# Patient Record
Sex: FEMALE | Race: WHITE | URBAN - METROPOLITAN AREA
[De-identification: names, ages, dates, MRNs, and addresses within clinical notes are randomized per-mention and may not be internally consistent; named-entity substitution may affect disease eponyms.]

---

## 2017-03-01 ENCOUNTER — TELEPHONE (OUTPATIENT)
Dept: OBGYN CLINIC | Age: 22
End: 2017-03-01

## 2017-03-01 NOTE — TELEPHONE ENCOUNTER
- LMTCB on cell and home numbers. Also called Pierce Smith (on PHI) and LM to have patient called me back. She voiced understanding.       ____________________________________        Ogemaw Lusher hugo Kothari,     I see that you had cancelled your 3 month follow up appointment on 1/30/17. May I reschedule this appointment for you? This appointment is for a 3 month test of cure, vaginal swab for STDs.      Thank you,   BETI Guo   (Dr. Anibal Rajput Nurse)          This [employer] message has not been read

## 2017-03-01 NOTE — LETTER
03/03/2017 Osmany Cha [facility] [address] Dear Malia Spencer; We have been unable to reach you by telephone regarding missed appointment. Please call our office at [phone] at your earliest convenience. Respectfully, 
 
 
 
 
BETI Silva 
(Dr. Shawna Valdivia Nurse)

## 2017-03-01 NOTE — TELEPHONE ENCOUNTER
----- Message from Jacqueline Bloch sent at 2/9/2017  8:42 AM EST -----  Regarding: RE: 3 month LATANYA - 1/2017  Mychart message sent to pt. She had cancelled her 1/20/17 LATANYA appt.     ----- Message -----     From: Jacqueline Bloch     Sent: 10/27/2016   3:36 PM       To: Jacqueline Bloch  Subject: 3 month LATANYA - 1/2017                             Patient returned my call and I advised of lab results.  She voiced understanding. (+ Chlamydia 10/2016)     3 month LATANYA donald'd for Monday, 1/30/16 @ 9:30am.

## 2017-03-10 ENCOUNTER — DOCUMENTATION ONLY (OUTPATIENT)
Dept: OBGYN CLINIC | Age: 22
End: 2017-03-10

## 2017-03-10 NOTE — PROGRESS NOTES
Records received and reivewed from [facility].     AE (2/16/16)   pap neg  +BV  Aviane    Visit (5/26/16) for pain  US wnl  GC neg

## 2017-05-19 ENCOUNTER — OFFICE VISIT (OUTPATIENT)
Dept: NEUROLOGY | Age: 22
End: 2017-05-19

## 2017-05-19 ENCOUNTER — TELEPHONE (OUTPATIENT)
Dept: NEUROLOGY | Age: 22
End: 2017-05-19

## 2017-05-19 VITALS
DIASTOLIC BLOOD PRESSURE: 72 MMHG | TEMPERATURE: 98.4 F | RESPIRATION RATE: 15 BRPM | WEIGHT: 175 LBS | BODY MASS INDEX: 29.88 KG/M2 | OXYGEN SATURATION: 98 % | HEART RATE: 65 BPM | HEIGHT: 64 IN | SYSTOLIC BLOOD PRESSURE: 110 MMHG

## 2017-05-19 DIAGNOSIS — G43.019 MIGRAINE WITHOUT AURA, INTRACTABLE, WITHOUT STATUS MIGRAINOSUS: Primary | ICD-10-CM

## 2017-05-19 RX ORDER — SUMATRIPTAN 100 MG/1
TABLET, FILM COATED ORAL
Qty: 9 TAB | Refills: 3 | Status: SHIPPED | OUTPATIENT
Start: 2017-05-19 | End: 2017-09-13

## 2017-05-19 NOTE — MR AVS SNAPSHOT
Visit Information Date & Time Provider Department Dept. Phone Encounter #  
 5/19/2017 11:30 AM Javier Solo MD Neurology [facility] [place] [address] [phone] [id number] Follow-up Instructions Return in about 2 months (around 7/19/2017). Follow-up and Disposition History Upcoming Health Maintenance Date Due  
 HPV AGE 9Y-34Y (1 of 3 - Female 3 Dose Series) 11/22/2005 DTaP/Tdap/Td series (1 - Tdap) 11/22/2015 PAP AKA CERVICAL CYTOLOGY 11/22/2015 INFLUENZA AGE 9 TO ADULT 8/1/2017 Allergies as of 5/19/2017  Review Complete On: 5/19/2017 By: Javier Solo MD  
 No Known Allergies Current Immunizations  Never Reviewed No immunizations on file. Not reviewed this visit You Were Diagnosed With   
  
 Codes Comments Migraine without aura, intractable, without status migrainosus    -  Primary ICD-10-CM: G43.019 
ICD-9-CM: 346.11 Vitals BP Pulse Temp Resp Height(growth percentile) Weight(growth percentile) 110/72 65 98.4 °F (36.9 °C) 15 5' 4\" (1.626 m) 175 lb (79.4 kg) SpO2 BMI OB Status Smoking Status 98% 30.04 kg/m2 Having regular periods Never Smoker BMI and BSA Data Body Mass Index Body Surface Area 30.04 kg/m 2 1.89 m 2 [facility]erred [facility] [facility] [facility] [address] [facility] [phone] Your Updated Medication List  
  
   
This list is accurate as of: 5/19/17  3:59 PM.  Always use your most recent med list.  
  
  
  
  
 * SUMAtriptan succinate 3 mg/0.5 mL Pnij Commonly known as:  Dae Eunice  
1 Syringe by SubCUTAneous route as needed. 1 at HA onset and repeat q 1 hour until VELOZ relieved or used 4 in 24 hours * SUMAtriptan 100 mg tablet Commonly known as:  IMITREX  
1 at HA onset and repeat in 2 hours x 1 prn  
  
 topiramate ER 25 mg capsule Commonly known as:  TROKENDI XR  
 25 mg nightly for 1 week then, 50 mg nightly for 1 week then, 75 mg nightly for 1 week then, 100mg nightly thereafter. * Notice: This list has 2 medication(s) that are the same as other medications prescribed for you. Read the directions carefully, and ask your doctor or other care provider to review them with you. Prescriptions Sent to Pharmacy Refills SUMAtriptan succinate (ZEMBRACE SYMTOUCH) 3 mg/0.5 mL pnij 3 Si Syringe by SubCUTAneous route as needed. 1 at HA onset and repeat q 1 hour until VELOZ relieved or used 4 in 24 hours Class: Normal  
 Pharmacy: [address] Ph #: [phone] Route: SubCUTAneous SUMAtriptan (IMITREX) 100 mg tablet 3 Si at HA onset and repeat in 2 hours x 1 prn Class: Normal  
 Pharmacy: [address] [place] [address] Ph #: [phone]  
 topiramate ER (TROKENDI XR) 25 mg capsule 0 Si mg nightly for 1 week then, 50 mg nightly for 1 week then, 75 mg nightly for 1 week then, 100mg nightly thereafter. Class: Normal  
 Pharmacy: [address] Ph #: [phone] Follow-up Instructions Return in about 2 months (around 2017). Patient Instructions Track her headaches on a calendar and bring that calendar to each appointment To treat the headache I am giving you a combination of the Zembrace injection and sumatriptan tablets to use when you have a headache If you use the injection use 1 Zembrace injection at headache onset and repeat in 1 hour and you can use up to 4 of these in 24 hours For the tablets you can use 1 at headache onset and repeat in 2 hours up to a total of 2 tablets in 24 hours If you use a tablet and it fails and you wish to use the Zembrace injection due can use up to 2 doses of the injectable following the use of 1 tablet Start Trokendi  for prevention of migraines, 25 mg nightly for 1 week then, 50 mg nightly for 1 week then, 75 mg nightly for 1 week then, 100mg nightly thereafter. I sent the prescription for the 25 mg capsules of Trokendi to the pharmacy and gave you some samples of the 100 mg strength. Once you have finished the samples let me know and we will send a new prescription for the 100 mg capsules Introducing [facility]! Dear Jass Reece: Thank you for requesting a [employer] account. Our records indicate that you already have an active [employer] account. You can access your account anytime at [email or web address] Did you know that you can access your hospital and ER discharge instructions at any time in [employer]? You can also review all of your test results from your hospital stay or ER visit. Additional Information If you have questions, please visit the Frequently Asked Questions section of the [employer] website at [email or web address] Remember, [employer] is NOT to be used for urgent needs. For medical emergencies, dial 911. Now available from your iPhone and Android! Please provide this summary of care documentation to your next provider. Your primary care clinician is listed as Bill Welsh. If you have any questions after today's visit, please call [phone].

## 2017-05-19 NOTE — PATIENT INSTRUCTIONS
Track her headaches on a calendar and bring that calendar to each appointment    To treat the headache I am giving you a combination of the Zembrace injection and sumatriptan tablets to use when you have a headache    If you use the injection use 1 Zembrace injection at headache onset and repeat in 1 hour and you can use up to 4 of these in 24 hours    For the tablets you can use 1 at headache onset and repeat in 2 hours up to a total of 2 tablets in 24 hours    If you use a tablet and it fails and you wish to use the Zembrace injection due can use up to 2 doses of the injectable following the use of 1 tablet    Start Trokendi  for prevention of migraines, 25 mg nightly for 1 week then, 50 mg nightly for 1 week then, 75 mg nightly for 1 week then, 100mg nightly thereafter. I sent the prescription for the 25 mg capsules of Trokendi to the pharmacy and gave you some samples of the 100 mg strength.   Once you have finished the samples let me know and we will send a new prescription for the 100 mg capsules

## 2017-05-19 NOTE — PROGRESS NOTES
[address] [facility]   [place] [address] [address]   [place]   [phone] [place] [phone] Fax               Chief Complaint   Patient presents with    Headache     New patient    24-year-old left-handed young woman who presents today for evaluation of what she calls migraine headaches. She tells me that she has had headaches since childhood. When she was younger she would have a daily headache. She thought it was her eyes and she was taken for an eye examination and that was fine. She never had any neurologic intervention. She notes that the headaches seem to dissipate. They started to come back over the last year or so. Over the last few months they have gotten worse. She is not sleeping as well. She is working 2 jobs. Her stress level has increased the due to multiple issues. She notes over the last 3 months headaches have been more frequent and more intense. Headaches are typically located in the back of the head or in the frontal portion. Typically they are bilateral.  The headache she has today started on the right side and is now by temporal.  She describes the pain as sharp and sometimes throbbing. The pain will escalate throughout the headache. It comes without warning. No aura. She will frequently awaken with headaches as well. No triggers except that she notes that with exercise she can trigger a headache. No food triggers etc.  She has been using Excedrin or Motrin. She used Motrin exclusively and noted that it became ineffective and started to make her more nauseated and she changed to Excedrin. She has never had any migraine specific medications. She has not vomited but again has significant nausea. This is with a headache. No visual loss or change. No numbness tingling. No fever or chills. No chest pain or palpitations. She does not have any family history of headache or migraine.     She has no past medical or past surgical history. She takes no regular medicines at this point. Current Outpatient Prescriptions   Medication Sig Dispense Refill    levonorgestrel-ethinyl estradiol (AVIANE, ALESSE, LESSINA) 0.1-20 mg-mcg tab Take 1 Tab by mouth daily. 84 Tab 1    ondansetron (ZOFRAN ODT) 4 mg disintegrating tablet Take 1 Tab by mouth every eight (8) hours as needed for Nausea. 12 Tab 0    no medications prescribed at present but she is taking over-the-counter preparations with each headache and she does that multiple times a day    No Known Allergies  Social History   Substance Use Topics    Smoking status: Never Smoker    Smokeless tobacco: Never Used      Comment: Never used vapor or e-cigs     Alcohol use 3.0 oz/week     5 Cans of beer per week      Comment: Twice a week      ROS  Pertinent positives and negatives are noted and otherwise comprehensive review of systems is negative. Examination  Visit Vitals    /72    Pulse 65    Temp 98.4 °F (36.9 °C)    Resp 15    Ht 5' 4\" (1.626 m)    Wt 79.4 kg (175 lb)    SpO2 98%    BMI 30.04 kg/m2     Pleasant, well appearing. No icterus. Oropharynx clear. Supple neck without bruit. Heart regular. No murmur. No edema. Neurologically, she is awake, alert, and oriented with normal speech and language. Her cognition is normal.    Intact cranial nerves 2-12. No nystagmus. Visual fields full to confrontation. Disk margins are flat bilaterally. She has normal bulk and tone. She has no abnormal movement. She has no pronation or drift. She generates full strength in the upper and lower extremities to direct confrontational testing. Reflexes are symmetrical in the upper and lower extremities bilaterally. Her toes are down bilaterally. No Murry. Finger nose finger and rapid alternating movements are normal.  Steady gait. She is able to heel, toe, and tandem walk. No sensory deficit to primary modalities.       Impression/Plan  57-year-old with migraine headaches. We discussed migraine pathophysiology and its treatment including preventatives and abortive therapies. We discussed analgesic overuse and she is to stop using any over-the-counter medications. To treat her headache we gave her a tool kit approach using Zembrace injections particularly for the headaches with which she awakens or if the Imitrex 100 mg tablets we give her fail. She is to use either the tablet of the injection at headache onset. She may repeat the injection q. one hour until the headache is relieved or until she has used for in 24 hours. She may use the tablet 1 at onset and repeat in 2 hours maximum 2 doses in 24 hours. She can use an injection if she has use 1 tablet and it has failed. We discussed typical triptan side effects as well as potential benefits and alternatives. Her examination is normal and therefore I do not think we need to proceed with any further investigations. She will start Trokendi in a customary fashion for prevention and we will titrate to a dose of 100 mg daily and we again discussed potential side effects benefits and alternatives. She will track her headaches on a calendar and bring those to each appointment. She was given written migraine/headache education today. This note was created using voice recognition software. Despite editing, there may be syntax errors. This note will not be viewable in [address]

## 2017-05-19 NOTE — TELEPHONE ENCOUNTER
Spoke to Aspen Johnson @ [place] [facility] trokendi xr. Questioned if provider wanted topamax or trokendi XR titration. Per Dr Ambrosio Hardwick Trokendi XR.

## 2017-05-19 NOTE — TELEPHONE ENCOUNTER
----- Message from Gerald Dandy sent at 5/19/2017 12:52 PM EDT -----  Regarding: Dr. Sapphire Chapa from the \"[place] pharmacy next door\" would like a callback regarding pt's Rx. Best (C) [phone].

## 2017-05-19 NOTE — PROGRESS NOTES
New patient presenting with 11-15 headache days per month lasting 4 hours or more. Reports pain at temples, behind eyes and at base of neck. No testing done. No head injury. Takes OTC medications with each headache. Reports daily headaches as a child.

## 2017-05-31 ENCOUNTER — OFFICE VISIT (OUTPATIENT)
Dept: FAMILY MEDICINE CLINIC | Facility: CLINIC | Age: 22
End: 2017-05-31

## 2017-05-31 VITALS
HEIGHT: 64 IN | BODY MASS INDEX: 29.04 KG/M2 | HEART RATE: 74 BPM | DIASTOLIC BLOOD PRESSURE: 71 MMHG | RESPIRATION RATE: 20 BRPM | WEIGHT: 170.1 LBS | SYSTOLIC BLOOD PRESSURE: 126 MMHG | TEMPERATURE: 98 F | OXYGEN SATURATION: 99 %

## 2017-05-31 DIAGNOSIS — J06.9 UPPER RESPIRATORY TRACT INFECTION, UNSPECIFIED TYPE: Primary | ICD-10-CM

## 2017-05-31 DIAGNOSIS — R05.9 COUGH: ICD-10-CM

## 2017-05-31 DIAGNOSIS — J02.9 SORE THROAT: ICD-10-CM

## 2017-05-31 LAB
QUICKVUE INFLUENZA TEST: NEGATIVE
S PYO AG THROAT QL: NEGATIVE
VALID INTERNAL CONTROL?: YES
VALID INTERNAL CONTROL?: YES

## 2017-05-31 RX ORDER — BROMPHENIRAMINE MALEATE, PSEUDOEPHEDRINE HYDROCHLORIDE, AND DEXTROMETHORPHAN HYDROBROMIDE 2; 30; 10 MG/5ML; MG/5ML; MG/5ML
5 SYRUP ORAL
Qty: 118 ML | Refills: 0 | Status: SHIPPED | OUTPATIENT
Start: 2017-05-31 | End: 2017-06-07

## 2017-05-31 NOTE — MR AVS SNAPSHOT
Visit Information Date & Time Provider Department Dept. Phone Encounter #  
 5/31/2017  9:45 AM Nataly Oliveira NP [facility] [address] [phone] [id number] Upcoming Health Maintenance Date Due  
 HPV AGE 9Y-34Y (1 of 3 - Female 3 Dose Series) 11/22/2005 DTaP/Tdap/Td series (1 - Tdap) 11/22/2015 PAP AKA CERVICAL CYTOLOGY 11/22/2015 INFLUENZA AGE 9 TO ADULT 8/1/2017 Allergies as of 5/31/2017  Review Complete On: 5/31/2017 By: Nataly Oliveira NP No Known Allergies Current Immunizations  Never Reviewed No immunizations on file. Not reviewed this visit You Were Diagnosed With   
  
 Codes Comments Upper respiratory tract infection, unspecified type    -  Primary ICD-10-CM: J06.9 ICD-9-CM: 465.9 Cough     ICD-10-CM: R05 ICD-9-CM: 786.2 Sore throat     ICD-10-CM: J02.9 ICD-9-CM: 133 Vitals BP Pulse Temp Resp Height(growth percentile) Weight(growth percentile) 126/71 (BP 1 Location: Right arm, BP Patient Position: Sitting) 74 98 °F (36.7 °C) (Oral) 20 5' 4\" (1.626 m) 170 lb 1.6 oz (77.2 kg) LMP SpO2 BMI OB Status Smoking Status 05/24/2017 (Approximate) 99% 29.2 kg/m2 Having regular periods Never Smoker BMI and BSA Data Body Mass Index Body Surface Area  
 29.2 kg/m 2 1.87 m 2 Preferred Pharmacy Pharmacy Name Phone [id number] [place] [address] [facility] [phone] Your Updated Medication List  
  
   
This list is accurate as of: 5/31/17 10:16 AM.  Always use your most recent med list.  
  
  
  
  
 Brompheniramine-Pseudoeph-DM 2-30-10 mg/5 mL syrup Commonly known as:  BROMFED DM Take 5 mL by mouth three (3) times daily as needed for up to 7 days. * SUMAtriptan succinate 3 mg/0.5 mL Pnij Commonly known as:  Verasya Rushingel  
1 Syringe by SubCUTAneous route as needed. 1 at HA onset and repeat q 1 hour until VELOZ relieved or used 4 in 24 hours * SUMAtriptan 100 mg tablet Commonly known as:  IMITREX  
1 at HA onset and repeat in 2 hours x 1 prn  
  
 topiramate ER 25 mg capsule Commonly known as:  TROKENDI XR  
25 mg nightly for 1 week then, 50 mg nightly for 1 week then, 75 mg nightly for 1 week then, 100mg nightly thereafter. * Notice: This list has 2 medication(s) that are the same as other medications prescribed for you. Read the directions carefully, and ask your doctor or other care provider to review them with you. Prescriptions Sent to Pharmacy Refills Brompheniramine-Pseudoeph-DM (BROMFED DM) 2-30-10 mg/5 mL syrup 0 Sig: Take 5 mL by mouth three (3) times daily as needed for up to 7 days. Class: Normal  
 Pharmacy: [facility]  #: [phone] Route: Oral  
  
We Performed the Following AMB POC RAPID INFLUENZA TEST [32914 CPT(R)] AMB POC RAPID STREP A [17937 CPT(R)] Patient Instructions Cough: Care Instructions Your Care Instructions A cough is your body's response to something that bothers your throat or airways. Many things can cause a cough. You might cough because of a cold or the flu, bronchitis, or asthma. Smoking, postnasal drip, allergies, and stomach acid that backs up into your throat also can cause coughs. A cough is a symptom, not a disease. Most coughs stop when the cause, such as a cold, goes away. You can take a few steps at home to cough less and feel better. Follow-up care is a key part of your treatment and safety. Be sure to make and go to all appointments, and call your doctor if you are having problems. It's also a good idea to know your test results and keep a list of the medicines you take. How can you care for yourself at home? · Drink lots of water and other fluids. This helps thin the mucus and soothes a dry or sore throat. Honey or lemon juice in hot water or tea may ease a dry cough. · Take cough medicine as directed by your doctor. · Prop up your head on pillows to help you breathe and ease a dry cough. · Try cough drops to soothe a dry or sore throat. Cough drops don't stop a cough. Medicine-flavored cough drops are no better than candy-flavored drops or hard candy. · Do not smoke. Avoid secondhand smoke. If you need help quitting, talk to your doctor about stop-smoking programs and medicines. These can increase your chances of quitting for good. When should you call for help? Call 911 anytime you think you may need emergency care. For example, call if: 
· You have severe trouble breathing. Call your doctor now or seek immediate medical care if: 
· You cough up blood. · You have new or worse trouble breathing. · You have a new or higher fever. · You have a new rash. Watch closely for changes in your health, and be sure to contact your doctor if: 
· You cough more deeply or more often, especially if you notice more mucus or a change in the color of your mucus. · You have new symptoms, such as a sore throat, an earache, or sinus pain. · You do not get better as expected. Where can you learn more? Go to http://rika-chayo.info/. Enter D279 in the search box to learn more about \"Cough: Care Instructions. \" Current as of: May 27, 2016 Content Version: 11.2 © 6811-2358 [employer]. Care instructions adapted under license by [employer] (which disclaims liability or warranty for this information). If you have questions about a medical condition or this instruction, always ask your healthcare professional. [place] [address] any warranty or liability for your use of this information. Sore Throat: Care Instructions Your Care Instructions Infection by bacteria or a virus causes most sore throats.  Cigarette smoke, dry air, air pollution, allergies, and yelling can also cause a sore throat. Sore throats can be painful and annoying. Fortunately, most sore throats go away on their own. If you have a bacterial infection, your doctor may prescribe antibiotics. Follow-up care is a key part of your treatment and safety. Be sure to make and go to all appointments, and call your doctor if you are having problems. It's also a good idea to know your test results and keep a list of the medicines you take. How can you care for yourself at home? · If your doctor prescribed antibiotics, take them as directed. Do not stop taking them just because you feel better. You need to take the full course of antibiotics. · Gargle with warm salt water once an hour to help reduce swelling and relieve discomfort. Use 1 teaspoon of salt mixed in 1 cup of warm water. · Take an over-the-counter pain medicine, such as acetaminophen (Tylenol), ibuprofen (Advil, Motrin), or naproxen (Aleve). Read and follow all instructions on the label. · Be careful when taking over-the-counter cold or flu medicines and Tylenol at the same time. Many of these medicines have acetaminophen, which is Tylenol. Read the labels to make sure that you are not taking more than the recommended dose. Too much acetaminophen (Tylenol) can be harmful. · Drink plenty of fluids. Fluids may help soothe an irritated throat. Hot fluids, such as tea or soup, may help decrease throat pain. · Use over-the-counter throat lozenges to soothe pain. Regular cough drops or hard candy may also help. These should not be given to young children because of the risk of choking. · Do not smoke or allow others to smoke around you. If you need help quitting, talk to your doctor about stop-smoking programs and medicines. These can increase your chances of quitting for good. · Use a vaporizer or humidifier to add moisture to your bedroom. Follow the directions for cleaning the machine. When should you call for help? Call your doctor now or seek immediate medical care if: 
· You have new or worse trouble swallowing. · Your sore throat gets much worse on one side. Watch closely for changes in your health, and be sure to contact your doctor if you do not get better as expected. Where can you learn more? Go to http://rika-chayo.info/. Enter [id number] [id number] in the search box to learn more about \"Sore Throat: Care Instructions. \" Current as of: July 29, 2016 Content Version: 11.2 © 6744-8070 [employer]. Care instructions adapted under license by [employer] (which disclaims liability or warranty for this information). If you have questions about a medical condition or this instruction, always ask your healthcare professional. [address] any warranty or liability for your use of this information. Rapid Strep Test: About This Test 
What is it? A rapid strep test checks the bacteria in your throat to see if strep is the cause of your sore throat. Why is this test done? It may be done so your doctor can find out right away whether you have strep throat. There is another test for strep, called a throat culture, but that test takes a few days to get the results. How can you prepare for the test? 
You don't need to do anything before you have this test. 
What happens during the test? 
· You will be asked to tilt your head back and open your mouth as wide as possible. · Your doctor will press your tongue down with a flat stick (tongue depressor) and then examine your mouth and throat. · A clean cotton swab will be rubbed over the back of your throat, around your tonsils, and over any red areas or sores to collect a sample. How long does the test take? · The test takes less than a minute. · Results are available in 10 to 15 minutes. When should you call for help? Call your doctor now or seek immediate medical care if: · Your pain gets worse on one side of your throat, or you have trouble opening your mouth. · You have a new or higher fever, or you have a fever with a stiff neck or severe headache. · Swallowing becomes harder, or you have any trouble breathing. · You are sensitive to light or feel very sleepy or confused. Watch closely for changes in your health, and be sure to contact your doctor if: 
· You do not start to feel better after 2 days. Follow-up care is a key part of your treatment and safety. Be sure to make and go to all appointments, and call your doctor if you are having problems. It's also a good idea to keep a list of the medicines you take. Ask your doctor when you can expect to have your test results. Where can you learn more? Go to http://rika-chayo.info/. Enter B356 in the search box to learn more about \"Rapid Strep Test: About This Test.\" Current as of: July 29, 2016 Content Version: 11.2 © 3811-4513 [employer]. Care instructions adapted under license by [employer] (which disclaims liability or warranty for this information). If you have questions about a medical condition or this instruction, always ask your healthcare professional. [place] [address] any warranty or liability for your use of this information. Upper Respiratory Infection (Cold): Care Instructions Your Care Instructions An upper respiratory infection, or URI, is an infection of the nose, sinuses, or throat. URIs are spread by coughs, sneezes, and direct contact. The common cold is the most frequent kind of URI. The flu and sinus infections are other kinds of URIs. Almost all URIs are caused by viruses. Antibiotics won't cure them. But you can treat most infections with home care. This may include drinking lots of fluids and taking over-the-counter pain medicine. You will probably feel better in 4 to 10 days. The doctor has checked you carefully, but problems can develop later. If you notice any problems or new symptoms, get medical treatment right away. Follow-up care is a key part of your treatment and safety. Be sure to make and go to all appointments, and call your doctor if you are having problems. It's also a good idea to know your test results and keep a list of the medicines you take. How can you care for yourself at home? · To prevent dehydration, drink plenty of fluids, enough so that your urine is light yellow or clear like water. Choose water and other caffeine-free clear liquids until you feel better. If you have kidney, heart, or liver disease and have to limit fluids, talk with your doctor before you increase the amount of fluids you drink. · Take an over-the-counter pain medicine, such as acetaminophen (Tylenol), ibuprofen (Advil, Motrin), or naproxen (Aleve). Read and follow all instructions on the label. · Before you use cough and cold medicines, check the label. These medicines may not be safe for young children or for people with certain health problems. · Be careful when taking over-the-counter cold or flu medicines and Tylenol at the same time. Many of these medicines have acetaminophen, which is Tylenol. Read the labels to make sure that you are not taking more than the recommended dose. Too much acetaminophen (Tylenol) can be harmful. · Get plenty of rest. 
· Do not smoke or allow others to smoke around you. If you need help quitting, talk to your doctor about stop-smoking programs and medicines. These can increase your chances of quitting for good. When should you call for help? Call 911 anytime you think you may need emergency care. For example, call if: 
· You have severe trouble breathing. Call your doctor now or seek immediate medical care if: 
· You seem to be getting much sicker. · You have new or worse trouble breathing. · You have a new or higher fever. · You have a new rash. Watch closely for changes in your health, and be sure to contact your doctor if: 
· You have a new symptom, such as a sore throat, an earache, or sinus pain. · You cough more deeply or more often, especially if you notice more mucus or a change in the color of your mucus. · You do not get better as expected. Where can you learn more? Go to http://rika-chayo.info/. Enter Z929 in the search box to learn more about \"Upper Respiratory Infection (Cold): Care Instructions. \" Current as of: June 30, 2016 Content Version: 11.2 © 5901-2261 [employer]. Care instructions adapted under license by [employer] (which disclaims liability or warranty for this information). If you have questions about a medical condition or this instruction, always ask your healthcare professional. [address] any warranty or liability for your use of this information. Introducing [facility]! Dear Jorge Light: Thank you for requesting a [employer] account. Our records indicate that you already have an active [employer] account. You can access your account anytime at [email or web address] Did you know that you can access your hospital and ER discharge instructions at any time in [employer]? You can also review all of your test results from your hospital stay or ER visit. Additional Information If you have questions, please visit the Frequently Asked Questions section of the [employer] website at [email or web address] Remember, [employer] is NOT to be used for urgent needs. For medical emergencies, dial 911. Now available from your iPhone and Android! Please provide this summary of care documentation to your next provider. Your primary care clinician is listed as Ye Rose. If you have any questions after today's visit, please call [phone].

## 2017-05-31 NOTE — PATIENT INSTRUCTIONS
Cough: Care Instructions  Your Care Instructions  A cough is your body's response to something that bothers your throat or airways. Many things can cause a cough. You might cough because of a cold or the flu, bronchitis, or asthma. Smoking, postnasal drip, allergies, and stomach acid that backs up into your throat also can cause coughs. A cough is a symptom, not a disease. Most coughs stop when the cause, such as a cold, goes away. You can take a few steps at home to cough less and feel better. Follow-up care is a key part of your treatment and safety. Be sure to make and go to all appointments, and call your doctor if you are having problems. It's also a good idea to know your test results and keep a list of the medicines you take. How can you care for yourself at home? · Drink lots of water and other fluids. This helps thin the mucus and soothes a dry or sore throat. Honey or lemon juice in hot water or tea may ease a dry cough. · Take cough medicine as directed by your doctor. · Prop up your head on pillows to help you breathe and ease a dry cough. · Try cough drops to soothe a dry or sore throat. Cough drops don't stop a cough. Medicine-flavored cough drops are no better than candy-flavored drops or hard candy. · Do not smoke. Avoid secondhand smoke. If you need help quitting, talk to your doctor about stop-smoking programs and medicines. These can increase your chances of quitting for good. When should you call for help? Call 911 anytime you think you may need emergency care. For example, call if:  · You have severe trouble breathing. Call your doctor now or seek immediate medical care if:  · You cough up blood. · You have new or worse trouble breathing. · You have a new or higher fever. · You have a new rash.   Watch closely for changes in your health, and be sure to contact your doctor if:  · You cough more deeply or more often, especially if you notice more mucus or a change in the color of your mucus. · You have new symptoms, such as a sore throat, an earache, or sinus pain. · You do not get better as expected. Where can you learn more? Go to http://rika-chayo.info/. Enter D279 in the search box to learn more about \"Cough: Care Instructions. \"  Current as of: May 27, 2016  Content Version: 11.2  © 2006-2017 [employer]. Care instructions adapted under license by [employer] (which disclaims liability or warranty for this information). If you have questions about a medical condition or this instruction, always ask your healthcare professional. [place] [address] any warranty or liability for your use of this information. Sore Throat: Care Instructions  Your Care Instructions    Infection by bacteria or a virus causes most sore throats. Cigarette smoke, dry air, air pollution, allergies, and yelling can also cause a sore throat. Sore throats can be painful and annoying. Fortunately, most sore throats go away on their own. If you have a bacterial infection, your doctor may prescribe antibiotics. Follow-up care is a key part of your treatment and safety. Be sure to make and go to all appointments, and call your doctor if you are having problems. It's also a good idea to know your test results and keep a list of the medicines you take. How can you care for yourself at home? · If your doctor prescribed antibiotics, take them as directed. Do not stop taking them just because you feel better. You need to take the full course of antibiotics. · Gargle with warm salt water once an hour to help reduce swelling and relieve discomfort. Use 1 teaspoon of salt mixed in 1 cup of warm water. · Take an over-the-counter pain medicine, such as acetaminophen (Tylenol), ibuprofen (Advil, Motrin), or naproxen (Aleve). Read and follow all instructions on the label.   · Be careful when taking over-the-counter cold or flu medicines and Tylenol at the same time. Many of these medicines have acetaminophen, which is Tylenol. Read the labels to make sure that you are not taking more than the recommended dose. Too much acetaminophen (Tylenol) can be harmful. · Drink plenty of fluids. Fluids may help soothe an irritated throat. Hot fluids, such as tea or soup, may help decrease throat pain. · Use over-the-counter throat lozenges to soothe pain. Regular cough drops or hard candy may also help. These should not be given to young children because of the risk of choking. · Do not smoke or allow others to smoke around you. If you need help quitting, talk to your doctor about stop-smoking programs and medicines. These can increase your chances of quitting for good. · Use a vaporizer or humidifier to add moisture to your bedroom. Follow the directions for cleaning the machine. When should you call for help? Call your doctor now or seek immediate medical care if:  · You have new or worse trouble swallowing. · Your sore throat gets much worse on one side. Watch closely for changes in your health, and be sure to contact your doctor if you do not get better as expected. Where can you learn more? Go to http://rika-chayo.info/. Enter [id number] [id number] in the search box to learn more about \"Sore Throat: Care Instructions. \"  Current as of: July 29, 2016  Content Version: 11.2  © 9093-2351 [employer]. Care instructions adapted under license by [employer] (which disclaims liability or warranty for this information). If you have questions about a medical condition or this instruction, always ask your healthcare professional. [place] [address] any warranty or liability for your use of this information. Rapid Strep Test: About This Test  What is it? A rapid strep test checks the bacteria in your throat to see if strep is the cause of your sore throat. Why is this test done?   It may be done so your doctor can find out right away whether you have strep throat. There is another test for strep, called a throat culture, but that test takes a few days to get the results. How can you prepare for the test?  You don't need to do anything before you have this test.  What happens during the test?  · You will be asked to tilt your head back and open your mouth as wide as possible. · Your doctor will press your tongue down with a flat stick (tongue depressor) and then examine your mouth and throat. · A clean cotton swab will be rubbed over the back of your throat, around your tonsils, and over any red areas or sores to collect a sample. How long does the test take? · The test takes less than a minute. · Results are available in 10 to 15 minutes. When should you call for help? Call your doctor now or seek immediate medical care if:  · Your pain gets worse on one side of your throat, or you have trouble opening your mouth. · You have a new or higher fever, or you have a fever with a stiff neck or severe headache. · Swallowing becomes harder, or you have any trouble breathing. · You are sensitive to light or feel very sleepy or confused. Watch closely for changes in your health, and be sure to contact your doctor if:  · You do not start to feel better after 2 days. Follow-up care is a key part of your treatment and safety. Be sure to make and go to all appointments, and call your doctor if you are having problems. It's also a good idea to keep a list of the medicines you take. Ask your doctor when you can expect to have your test results. Where can you learn more? Go to http://rika-chayo.info/. Enter B356 in the search box to learn more about \"Rapid Strep Test: About This Test.\"  Current as of: July 29, 2016  Content Version: 11.2  © 2537-3180 [employer]. Care instructions adapted under license by [employer] (which disclaims liability or warranty for this information).  If you have questions about a medical condition or this instruction, always ask your healthcare professional. [address] any warranty or liability for your use of this information. Upper Respiratory Infection (Cold): Care Instructions  Your Care Instructions    An upper respiratory infection, or URI, is an infection of the nose, sinuses, or throat. URIs are spread by coughs, sneezes, and direct contact. The common cold is the most frequent kind of URI. The flu and sinus infections are other kinds of URIs. Almost all URIs are caused by viruses. Antibiotics won't cure them. But you can treat most infections with home care. This may include drinking lots of fluids and taking over-the-counter pain medicine. You will probably feel better in 4 to 10 days. The doctor has checked you carefully, but problems can develop later. If you notice any problems or new symptoms, get medical treatment right away. Follow-up care is a key part of your treatment and safety. Be sure to make and go to all appointments, and call your doctor if you are having problems. It's also a good idea to know your test results and keep a list of the medicines you take. How can you care for yourself at home? · To prevent dehydration, drink plenty of fluids, enough so that your urine is light yellow or clear like water. Choose water and other caffeine-free clear liquids until you feel better. If you have kidney, heart, or liver disease and have to limit fluids, talk with your doctor before you increase the amount of fluids you drink. · Take an over-the-counter pain medicine, such as acetaminophen (Tylenol), ibuprofen (Advil, Motrin), or naproxen (Aleve). Read and follow all instructions on the label. · Before you use cough and cold medicines, check the label. These medicines may not be safe for young children or for people with certain health problems.   · Be careful when taking over-the-counter cold or flu medicines and Tylenol at the same time. Many of these medicines have acetaminophen, which is Tylenol. Read the labels to make sure that you are not taking more than the recommended dose. Too much acetaminophen (Tylenol) can be harmful. · Get plenty of rest.  · Do not smoke or allow others to smoke around you. If you need help quitting, talk to your doctor about stop-smoking programs and medicines. These can increase your chances of quitting for good. When should you call for help? Call 911 anytime you think you may need emergency care. For example, call if:  · You have severe trouble breathing. Call your doctor now or seek immediate medical care if:  · You seem to be getting much sicker. · You have new or worse trouble breathing. · You have a new or higher fever. · You have a new rash. Watch closely for changes in your health, and be sure to contact your doctor if:  · You have a new symptom, such as a sore throat, an earache, or sinus pain. · You cough more deeply or more often, especially if you notice more mucus or a change in the color of your mucus. · You do not get better as expected. Where can you learn more? Go to http://rika-chayo.info/. Enter F279 in the search box to learn more about \"Upper Respiratory Infection (Cold): Care Instructions. \"  Current as of: June 30, 2016  Content Version: 11.2  © 3205-1788 [employer]. Care instructions adapted under license by [employer] (which disclaims liability or warranty for this information). If you have questions about a medical condition or this instruction, always ask your healthcare professional. [place] [address] any warranty or liability for your use of this information.

## 2017-05-31 NOTE — PROGRESS NOTES
Chief Complaint   Patient presents with    Sore Throat     5 day hx of sore throat, cough, headache and runny nose. No fever. Has taken Dayquil and Nyqgil and [place] Plus.  Cough    Nasal Discharge    Headache     HISTORY OF PRESENT ILLNESS  Francisca Ramos is a 24 y.o. Jeff Marcella presents with sore throat,cough, headache, and runny nose since Friday (5 days). Dayquil,Nyquil and [place] Plus have not alleviated symptoms. Denies hx of asthma, pneumonia, COPD,or bronchitis. Denies fever. Non-smoker. May have seasonal allergies. Sore Throat    The history is provided by the patient. This is a new problem. The current episode started more than 2 days ago. The problem has been gradually worsening. There has been no fever. Associated symptoms include congestion, headaches and cough. Pertinent negatives include no vomiting, no ear discharge, no ear pain, no plugged ear sensation, no shortness of breath, no swollen glands and no trouble swallowing. She has had no exposure to strep or mono. Cough   Associated symptoms include headaches. Pertinent negatives include no chest pain and no shortness of breath. Nasal Discharge   Associated symptoms include headaches. Pertinent negatives include no chest pain and no shortness of breath. Headache   Associated symptoms include headaches. Pertinent negatives include no chest pain and no shortness of breath. Review of Systems   Constitutional: Negative for chills, diaphoresis, fever and malaise/fatigue. HENT: Positive for congestion and sore throat. Negative for ear discharge, ear pain and trouble swallowing. Post nasal drip   Eyes: Negative for discharge and redness. Respiratory: Positive for cough. Negative for hemoptysis, sputum production, shortness of breath and wheezing. Cardiovascular: Negative for chest pain. Gastrointestinal: Negative for nausea and vomiting. Musculoskeletal: Negative for joint pain, myalgias and neck pain. Skin: Negative for itching and rash. Neurological: Positive for headaches. Negative for dizziness. Past Medical History:   Diagnosis Date    Anxiety     Depression     Headache     N&V (nausea and vomiting)     Routine Papanicolaou smear 2016    Negative per pt. NP in [place], unsure of name of office.  Snoring     SOB (shortness of breath)      Family History   Problem Relation Age of Onset    No Known Problems Mother     Hypertension Father     No Known Problems Sister     No Known Problems Brother     Ovarian Cancer Maternal Aunt     Cancer Maternal Uncle      Lung     No Known Problems Paternal Aunt     No Known Problems Paternal Uncle     Cancer Maternal Grandmother      Lung     No Known Problems Maternal Grandfather     Diabetes Paternal Grandmother     Cancer Paternal Grandfather      Liver (?)    Cancer Other     Mental Retardation Other      Social History     Social History    Marital status: SINGLE     Spouse name: N/A    Number of children: N/A    Years of education: N/A     Occupational History    Not on file. Social History Main Topics    Smoking status: Never Smoker    Smokeless tobacco: Never Used      Comment: Never used vapor or e-cigs     Alcohol use 3.0 oz/week     5 Cans of beer per week      Comment: Twice a week     Drug use: No    Sexual activity: Yes     Partners: Male     Birth control/ protection: Pill     Other Topics Concern    Not on file     Social History Narrative       Current Outpatient Prescriptions:     Brompheniramine-Pseudoeph-DM (BROMFED DM) 2-30-10 mg/5 mL syrup, Take 5 mL by mouth three (3) times daily as needed for up to 7 days. , Disp: 118 mL, Rfl: 0    SUMAtriptan succinate (ZEMBRACE SYMTOUCH) 3 mg/0.5 mL pnij, 1 Syringe by SubCUTAneous route as needed.  1 at HA onset and repeat q 1 hour until VELOZ relieved or used 4 in 24 hours, Disp: 8 Syringe, Rfl: 3    SUMAtriptan (IMITREX) 100 mg tablet, 1 at HA onset and repeat in 2 hours x 1 prn, Disp: 9 Tab, Rfl: 3    topiramate ER (TROKENDI XR) 25 mg capsule, 25 mg nightly for 1 week then, 50 mg nightly for 1 week then, 75 mg nightly for 1 week then, 100mg nightly thereafter., Disp: 42 Cap, Rfl: 0    Objective:    Visit Vitals    /71 (BP 1 Location: Right arm, BP Patient Position: Sitting)    Pulse 74    Temp 98 °F (36.7 °C) (Oral)    Resp 20    Ht 5' 4\" (1.626 m)    Wt 170 lb 1.6 oz (77.2 kg)    LMP 05/24/2017 (Approximate)    SpO2 99%    BMI 29.2 kg/m2     Physical Exam   Constitutional: She is oriented to person, place, and time. She appears well-developed and well-nourished. HENT:   Head: Normocephalic and atraumatic. Right Ear: External ear normal.   Left Ear: External ear normal.   Nose: Nasal discharge present. Mouth/Throat: Oropharynx is clear and moist. No oropharyngeal exudate. Moist nasal mucosa with blocked nasal passages. Eyes: Conjunctivae are normal.   Neck: Normal range of motion. Neck supple. Cardiovascular: Normal rate, regular rhythm and normal heart sounds. Pulmonary/Chest: Effort normal and breath sounds normal. No respiratory distress. She has no wheezes. She has no rales. Frequent dry cough   Lymphadenopathy:     She has no cervical adenopathy. Neurological: She is alert and oriented to person, place, and time. Skin: Skin is warm and dry. Psychiatric: She has a normal mood and affect. ASSESSMENT and PLAN    ICD-10-CM ICD-9-CM    1. Upper respiratory tract infection, unspecified type J06.9 465.9 AMB POC RAPID INFLUENZA TEST   2. Cough R05 786.2 Brompheniramine-Pseudoeph-DM (BROMFED DM) 2-30-10 mg/5 mL syrup      AMB POC RAPID INFLUENZA TEST   3. Sore throat J02.9 462 AMB POC RAPID STREP A      AMB POC RAPID INFLUENZA TEST     Lab results discussed and reviewed with patient. Negative rapid influenza and strep tests. Reviewed medications,s/s allergic reaction and side effects in detail.   Instructed use of humidifier, saline nasal spray or OTC Flonase nasal spray, one spray each nostril daily, warm salt water gargles, lemon and honey throat lozenges, increase fluid intake, rest and Tylenol or Ibuprofen for pain/fever. Advised if symptoms do not improve in 3-5 days,or worsen prior, to f/u with PCP or seek further medical evaluation. After visit summary discussed with and given to patient who verbalized understanding and was given the opportunity to ask questions.

## 2017-06-23 NOTE — PROGRESS NOTES
Patient has titrated onto the Trokendi up to 100 mg tolerating it well. Prescription renewed she will schedule follow-up.

## 2017-09-13 ENCOUNTER — INITIAL PRENATAL (OUTPATIENT)
Dept: OBGYN CLINIC | Age: 22
End: 2017-09-13

## 2017-09-13 VITALS
DIASTOLIC BLOOD PRESSURE: 66 MMHG | HEIGHT: 64 IN | WEIGHT: 170.4 LBS | BODY MASS INDEX: 29.09 KG/M2 | RESPIRATION RATE: 18 BRPM | SYSTOLIC BLOOD PRESSURE: 106 MMHG

## 2017-09-13 DIAGNOSIS — Z3A.01 LESS THAN 8 WEEKS GESTATION OF PREGNANCY: Primary | ICD-10-CM

## 2017-09-13 PROBLEM — Z34.90 PREGNANT: Status: ACTIVE | Noted: 2017-09-13

## 2017-09-13 NOTE — PROGRESS NOTES
Judie Fisher is a 24 y.o. G0 presenting for consultation. Her main concerns today include nausea (and vomiting this morning) and recurrent headaches; patient states she has been having recurrent headaches prior to pregnancy. Ob/Gyn Hx:  G 0  LMP-8/1/17  Menarche- 15/15  Menses (prior to pregnancy): regular monthly cycles? yes, last 7 days, passage of clots? no, intermenstrual spotting? no, Number of tampons per day? 2  Contraception-None (OCP discontinued a few months ago)  STI- Chlamydia (2016)  ? SA-yes    Health maintenance:  Pap-2016, normal   Mammo-N/A  Colonoscopy- denies  Gardasil-denies    Past Medical History:   Diagnosis Date    Anxiety     Chlamydia 10/2016    Depression     Headache     N&V (nausea and vomiting)     Routine Papanicolaou smear 02/09/2016    Nl with +yeast (media tab)     Snoring     SOB (shortness of breath)        Past Surgical History:   Procedure Laterality Date    HX TONSILLECTOMY      HX WISDOM TEETH EXTRACTION         Family History   Problem Relation Age of Onset    No Known Problems Mother     Hypertension Father     No Known Problems Sister     No Known Problems Brother     Ovarian Cancer Maternal Aunt     Cancer Maternal Uncle      Lung     No Known Problems Paternal Aunt     No Known Problems Paternal Uncle     Cancer Maternal Grandmother      Lung     No Known Problems Maternal Grandfather     Diabetes Paternal Grandmother     Cancer Paternal Grandfather      Liver (?)    Cancer Other     Mental Retardation Other        Social History     Social History    Marital status: SINGLE     Spouse name: N/A    Number of children: N/A    Years of education: N/A     Occupational History    Not on file.      Social History Main Topics    Smoking status: Never Smoker    Smokeless tobacco: Never Used      Comment: Never used vapor or e-cigs     Alcohol use No      Comment: Twice a week     Drug use: No    Sexual activity: Yes     Partners: Male Birth control/ protection: None     Other Topics Concern    Not on file     Social History Narrative       Current Outpatient Prescriptions   Medication Sig Dispense Refill    topiramate ER (TROKENDI XR) 100 mg capsule Take 1 Cap by mouth daily. 30 Cap 3    SUMAtriptan succinate (ZEMBRACE SYMTOUCH) 3 mg/0.5 mL pnij 1 Syringe by SubCUTAneous route as needed. 1 at HA onset and repeat q 1 hour until VELOZ relieved or used 4 in 24 hours 8 Syringe 3    SUMAtriptan (IMITREX) 100 mg tablet 1 at HA onset and repeat in 2 hours x 1 prn 9 Tab 3    topiramate ER (TROKENDI XR) 25 mg capsule 25 mg nightly for 1 week then, 50 mg nightly for 1 week then, 75 mg nightly for 1 week then, 100mg nightly thereafter.  42 Cap 0       No Known Allergies    Review of Systems - History obtained from the patient  Constitutional: negative for weight loss, fever, night sweats  HEENT: negative for hearing loss, earache, congestion, snoring, sorethroat  CV: negative for chest pain, palpitations, edema  Resp: negative for cough, shortness of breath, wheezing  GI: negative for change in bowel habits, abdominal pain, black or bloody stools  : negative for frequency, dysuria, hematuria, vaginal discharge  MSK: negative for back pain, joint pain, muscle pain  Breast: negative for breast lumps, nipple discharge, galactorrhea  Skin :negative for itching, rash, hives  Neuro: negative for dizziness, headache, confusion, weakness  Psych: negative for anxiety, depression, change in mood  Heme/lymph: negative for bleeding, bruising, pallor    Physical Exam    Visit Vitals    Resp 18    Ht 5' 4\" (1.626 m)    Wt 170 lb 6.4 oz (77.3 kg)    LMP 08/01/2017 (Approximate)    BMI 29.25 kg/m2       Constitutional  · Appearance: well-nourished, well developed, alert, in no acute distress    HENT  · Head and Face: appears normal    Neck  · Inspection/Palpation: normal appearance, no masses or tenderness  · Lymph Nodes: no lymphadenopathy present  · Thyroid: gland size normal, nontender, no nodules or masses present on palpation    Chest  · Respiratory Effort: non-labored breathing  · Auscultation: CTAB, normal breath sounds    Cardiovascular  · Heart:  · Auscultation: regular rate and rhythm without murmur  · Extremities: no peripheral edema    Breasts  · Inspection of Breasts: breasts symmetrical, no skin changes, no discharge present, nipple appearance normal, no skin retraction present  · Palpation of Breasts and Axillae: no masses present on palpation, no breast tenderness  · Axillary Lymph Nodes: no lymphadenopathy present    Gastrointestinal  · Abdominal Examination: abdomen non-tender to palpation, normal bowel sounds, no masses present  · Liver and spleen: no hepatomegaly present, spleen not palpable  · Hernias: no hernias identified    Genitourinary  · External Genitalia: normal appearance for age, no discharge present, no tenderness present, no inflammatory lesions present, no masses present, no atrophy present  · Vagina: normal vaginal vault without central or paravaginal defects, no discharge present, no inflammatory lesions present, no masses present  · Bladder: non-tender to palpation  · Urethra: appears normal  · Cervix: normal   · Uterus: normal size, shape and consistency  · Adnexa: no adnexal tenderness present, no adnexal masses present  · Perineum: perineum within normal limits, no evidence of trauma, no rashes or skin lesions present    Skin  · General Inspection: no rash, no lesions identified    Neurologic/Psychiatric  · Mental Status:  · Orientation: grossly oriented to person, place and time  · Mood and Affect: mood normal, affect appropriate      Assessment/Plan:  24 y.o. G P A presenting for annual exam. Overall doing well.      Health Maintenance:  -counseled re: diet, exercise, healthy lifestyle  -pap/HPV  -Gardasil  -refer for mammo    Contraception:  -reviewed contraceptive options including LARCs    Current Problem:  -    RTC: 1 year for annual wellness assessment, or sooner prn for problems or concerns  -handouts and instructions provided

## 2017-09-13 NOTE — PROGRESS NOTES
Initial OB Visit    Atilio Lo is a 25 yo G0 at 6w1d by LMP presenting for consultation. Her main concerns today include nausea (and vomiting this morning) and recurrent headaches (unchanged from HAs outside of pregnancy). Otherwise doing well. This was unplanned pregnancy, but plans to continue with pregnancy.      Pregnancy symptoms:  -N/V? YES  -breast tenderness? YES  -fatigue? YES  -cramping? Occasional mild cramping  -weight change? Gain 2-3 lbs  -Vaginal bleeding? NO     Ob/Gyn Hx:  G 0  LMP-8/1/17  Menarche- [age]  Menses- regular monthly cycles? yes, last 7 days, passage of clots? no, intermenstrual spotting? no, Number of tampons per day? 2  Contraception-None (OCP discontinued a few months ago)  STI- Chlamydia (2016)  ? SA-yes    Health maintenance:  Pap-last year wnl, no abnls  Gardasil- no  Mammo- no    Substance history: negative for alcohol, tobacco and street drugs. Exposure history: There are no cats in the home. The patient was instructed to not change the cat litter. She admits close contact with children on a regular basis. She has had chicken pox or the vaccine in the past. UNSURE  Patient denies issues with domestic violence. Genetic Screening/Teratology Counseling: (Includes patient, baby's father, or anyone in either family with:)  3.  Patient's age >/= 28 at [facility]?-- no  .   2. Thalassemia ([facility], [facility], [address], or  background): MCV<80?--no.     3.  Neural tube defect (meningomyelocele, spina bifida, anencephaly)?--no.   4.  Congenital heart defect?--no.  5.  Down syndrome?--YES - maternal cousin  6. Jan-Sachs ([facility], [facility])?--no.   7.  Canavan's Disease?--no.   8.  Familial Dysautonomia?--no.   9.  Sickle cell disease or trait ()? --no   The patient has not been tested for sickle trait  10. Hemophilia or other blood disorders?--no. 11.  Muscular dystrophy?--no. 12.  Cystic fibrosis?--no. 13.  Rose's Chorea?--no.   14.  Mental retardation/autism (if yes was person tested for Fragile X)?--no. 15.  Other inherited genetic or chromosomal disorder?--no. 12.  Maternal metabolic disorder (DM, PKU, etc)?--no. 17.  Patient or FOB with a child with a birth defect not listed above?--no.  17a. Patient or FOB with a birth defect themselves?--no. 18.  Recurrent pregnancy loss, or stillbirth?--AUNT with 3 sabs  19. Any medications since LMP other than prenatal vitamins (include vitamins, supplements, OTC meds, drugs, alcohol)?--no. 20.  Any other genetic/environmental exposure to discuss?--no. Infection History:  1. Lives with someone with TB or TB exposed?--no.   2.  Patient or partner has history of genital herpes?--no.  3.  Rash or viral illness since LMP?--no.    4.  History of STD (GC, CT, HPV, syphilis, HIV)? --no   5. Other: OTHER? No travel to [facility]    Past Medical History:   Diagnosis Date    Anxiety     Chlamydia 10/2016    Depression     Headache     N&V (nausea and vomiting)     Routine Papanicolaou smear 02/09/2016    Nl with +yeast (media tab)     Snoring     SOB (shortness of breath)      Past Surgical History:   Procedure Laterality Date    HX TONSILLECTOMY      HX WISDOM TEETH EXTRACTION         Family History   Problem Relation Age of Onset    No Known Problems Mother     Hypertension Father     No Known Problems Sister     No Known Problems Brother     Ovarian Cancer Maternal Aunt     Cancer Maternal Uncle      Lung     No Known Problems Paternal Aunt     No Known Problems Paternal Uncle     Cancer Maternal Grandmother      Lung     No Known Problems Maternal Grandfather     Diabetes Paternal Grandmother     Cancer Paternal Grandfather      Liver (?)    Cancer Other     Mental Retardation Other      Social History     Social History    Marital status: SINGLE     Spouse name: N/A    Number of children: N/A    Years of education: N/A     Occupational History    Not on file.      Social History Main Topics    Smoking status: Never Smoker    Smokeless tobacco: Never Used      Comment: Never used vapor or e-cigs     Alcohol use No      Comment: Twice a week     Drug use: No    Sexual activity: Yes     Partners: Male     Birth control/ protection: None     Other Topics Concern    Not on file     Social History Narrative       Current Outpatient Prescriptions   Medication Sig Dispense Refill    topiramate ER (TROKENDI XR) 100 mg capsule Take 1 Cap by mouth daily. 30 Cap 3    SUMAtriptan succinate (ZEMBRACE SYMTOUCH) 3 mg/0.5 mL pnij 1 Syringe by SubCUTAneous route as needed. 1 at HA onset and repeat q 1 hour until VELOZ relieved or used 4 in 24 hours 8 Syringe 3    SUMAtriptan (IMITREX) 100 mg tablet 1 at HA onset and repeat in 2 hours x 1 prn 9 Tab 3    topiramate ER (TROKENDI XR) 25 mg capsule 25 mg nightly for 1 week then, 50 mg nightly for 1 week then, 75 mg nightly for 1 week then, 100mg nightly thereafter.  42 Cap 0     No Known Allergies      Review of Systems - History obtained from the patient  Constitutional: negative for weight loss, fever, night sweats  HEENT: negative for hearing loss, earache, congestion, snoring, sorethroat  CV: negative for chest pain, palpitations, edema  Resp: negative for cough, shortness of breath, wheezing  GI: negative for change in bowel habits, abdominal pain, black or bloody stools  : negative for frequency, dysuria, hematuria, vaginal discharge  MSK: negative for back pain, joint pain, muscle pain  Breast: negative for breast lumps, nipple discharge, galactorrhea  Skin :negative for itching, rash, hives  Neuro: negative for dizziness, headache, confusion, weakness  Psych: negative for anxiety, depression, change in mood  Heme/lymph: negative for bleeding, bruising, pallor    Physical Exam    Visit Vitals    /66 (BP 1 Location: Left arm, BP Patient Position: Sitting)    Resp 18    Ht 5' 4\" (1.626 m)    Wt 170 lb 6.4 oz (77.3 kg)    LMP 08/01/2017 (Approximate)    BMI 29.25 kg/m2       Constitutional  · Appearance: well-nourished, well developed, alert, in no acute distress    HENT  · Head and Face: appears normal    Chest  · Respiratory Effort: non-labored breathing      Cardiovascular  · Heart:  · Extremities: no peripheral edema    Gastrointestinal  Abdominal Examination: abdomen non-tender, non-distended    Genitourinary: deferred until next visit    Skin  · General Inspection: no rash, no lesions identified    Neurologic/Psychiatric  · Mental Status:  · Orientation: grossly oriented to person, place and time  · Mood and Affect: mood normal, affect appropriate    TV ULTRASOUND PERFORMED  A NORMAL SIZE UTERUS WITH AN GESTATIONAL SAC SEEN IN THE FUNDAL PORTION OF THE ENDOMETRIUM. TOO EARLY TO DETERMINE GESTATIONAL AGE, CLINICAL COORELATION RECOMMENDED. A NORMAL YOLK SAC IS SEEN. RIGHT OVARY APPEAR WITHIN NORMAL LIMITS. LEFT OVARY APPEARS WITHING NORMAL LIMITS. NO FREE FLUID IS SEEN IN THE CDS. Assessment/Plan:  24 y.o. G0 at 6w1d by LMP presenting for initial OB visit. Overall doing well.      IUP:   -dating scan to be repeated in 2 weeks   -advised pt initiate daily PNV   -flu vaccine in Oct/Nov  -tdap at 28 wks  -counseled on nutrition and proper weight gain in pregnancy as well as foods to avoid  -discussed other high yield topics including appropriate exercise levels, sexual activity, toxoplasmosis precautions, toxin avoidance, travel advice (including zika travel warnings)  -screen neg for DV    Genetic screening/diagnostic testing:  -counselled on screening options including CF testing, 1st trimester screen/NT --> MSAFP, 2nd trimester tetra screen, NIPT, CVS, amniocentesis, etc.  -pt considering options    PNL:   -new OB labs including VZV titer and full exam at next visit  -pap wnl 2016, no abnormal paps per pt    PMH/Pregnancy problems:  -N/V of pregnancy: small freq meals, B6/unisom, etc  -recurrent mild HAs: good hydration, tylenol prn  -h/o anxiety/depression: mood currently stable, no meds, discussed increased risk of PP depression    PP plans:  -feeding? tbd  -contraception? tbd  -circ (if male)?  tbd  -desires epidural  -FOB \"Julián\"    RTC: 2 weeks, or sooner prn for problems or concerns  -cramping, pain, bleeding precautions reviewed  -handouts and instructions provided    Adele Valdivia MD  9/13/2017  8:57 AM

## 2017-09-20 ENCOUNTER — TELEPHONE (OUTPATIENT)
Dept: OBGYN CLINIC | Age: 22
End: 2017-09-20

## 2017-09-20 RX ORDER — PROMETHAZINE HYDROCHLORIDE 12.5 MG/1
12.5 TABLET ORAL
Qty: 20 TAB | Refills: 1 | Status: SHIPPED | OUTPATIENT
Start: 2017-09-20 | End: 2017-09-25 | Stop reason: SDUPTHER

## 2017-09-20 NOTE — TELEPHONE ENCOUNTER
Patient is calling in for nausea due to pregnancy. The nausea has gotten worse since visit on 009/12/17. Patient would like something called in to [facility]. call patient at [phone]

## 2017-09-25 ENCOUNTER — ROUTINE PRENATAL (OUTPATIENT)
Dept: OBGYN CLINIC | Age: 22
End: 2017-09-25

## 2017-09-25 VITALS
WEIGHT: 164 LBS | SYSTOLIC BLOOD PRESSURE: 120 MMHG | BODY MASS INDEX: 28.15 KG/M2 | RESPIRATION RATE: 18 BRPM | DIASTOLIC BLOOD PRESSURE: 70 MMHG

## 2017-09-25 DIAGNOSIS — O21.9 NAUSEA AND VOMITING DURING PREGNANCY: Primary | ICD-10-CM

## 2017-09-25 RX ORDER — LANOLIN ALCOHOL/MO/W.PET/CERES
CREAM (GRAM) TOPICAL
COMMUNITY
End: 2017-10-10

## 2017-09-25 RX ORDER — PROMETHAZINE HYDROCHLORIDE 12.5 MG/1
12.5 TABLET ORAL
Qty: 30 TAB | Refills: 2 | Status: SHIPPED | OUTPATIENT
Start: 2017-09-25 | End: 2017-10-11 | Stop reason: SDUPTHER

## 2017-09-25 RX ORDER — ONDANSETRON 4 MG/1
4 TABLET, ORALLY DISINTEGRATING ORAL
Qty: 30 TAB | Refills: 2 | Status: SHIPPED | OUTPATIENT
Start: 2017-09-25 | End: 2017-10-16 | Stop reason: SDUPTHER

## 2017-09-25 NOTE — PROGRESS NOTES
Pregnancy Problem    Shy Cash is a 24 y.o. G1 at 10w11d by LMP dating presenting due to nausea and vomiting of pregnancy. Pt had to go to [facility] late last week and received 2 bags of IV fluids. She started taking phenergan, which improved nausea symptoms, but pt reports she is running out of medication. Also, she cannot take this at work because it makes her too drowsy. No other problems or concerns at this time. Denies cramping/bleeding, fevers/chills, dysuria or other concerns. Pt with some question about paternity. Reports current partner is supportive. Past Medical History:   Diagnosis Date    Anxiety     Chlamydia 10/2016    Depression     Headache     N&V (nausea and vomiting)     Routine Papanicolaou smear 02/09/2016    Nl with +yeast (media tab)     Snoring     SOB (shortness of breath)        Past Surgical History:   Procedure Laterality Date    HX TONSILLECTOMY      HX WISDOM TEETH EXTRACTION         Family History   Problem Relation Age of Onset    No Known Problems Mother     Hypertension Father     No Known Problems Sister     No Known Problems Brother     Ovarian Cancer Maternal Aunt     Cancer Maternal Uncle      Lung     No Known Problems Paternal Aunt     No Known Problems Paternal Uncle     Cancer Maternal Grandmother      Lung     No Known Problems Maternal Grandfather     Diabetes Paternal Grandmother     Cancer Paternal Grandfather      Liver (?)    Cancer Other     Mental Retardation Other      Downs' syndrome       Social History     Social History    Marital status: SINGLE     Spouse name: N/A    Number of children: N/A    Years of education: N/A     Occupational History    Not on file.      Social History Main Topics    Smoking status: Never Smoker    Smokeless tobacco: Never Used      Comment: Never used vapor or e-cigs     Alcohol use No      Comment: Twice a week     Drug use: No    Sexual activity: Yes     Partners: Male     Birth control/ protection: None     Other Topics Concern    Not on file     Social History Narrative     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       Current Outpatient Prescriptions   Medication Sig Dispense Refill    [id number] aj-folic-dha (PRENATAL DHA+COMPLETE PRENATAL) -300 mg-mcg-mg cmpk Take  by mouth.  ferrous sulfate (IRON) 325 mg (65 mg iron) tablet Take  by mouth Daily (before breakfast).  promethazine (PHENERGAN) 12.5 mg tablet Take 1 Tab by mouth every six (6) hours as needed for Nausea. 30 Tab 2    ondansetron (ZOFRAN ODT) 4 mg disintegrating tablet Take 1 Tab by mouth every eight (8) hours as needed for Nausea.  30 Tab 2       No Known Allergies    Review of Systems - History obtained from the patient  Constitutional: negative for weight loss, fever, night sweats  HEENT: negative for hearing loss, earache, congestion, snoring, sorethroat  CV: negative for chest pain, palpitations, edema  Resp: negative for cough, shortness of breath, wheezing  GI: negative for change in bowel habits, abdominal pain, black or bloody stools, +N/V  : negative for frequency, dysuria, hematuria, vaginal discharge  MSK: negative for back pain, joint pain, muscle pain  Breast: negative for breast lumps, nipple discharge, galactorrhea  Skin :negative for itching, rash, hives  Neuro: negative for dizziness, headache, confusion, weakness  Psych: negative for anxiety, depression, change in mood  Heme/lymph: negative for bleeding, bruising, pallor    Physical Exam    Visit Vitals    /70 (BP 1 Location: Right arm, BP Patient Position: Sitting)    Resp 18    Wt 164 lb (74.4 kg)    LMP 08/01/2017 (Approximate)    BMI 28.15 kg/m2       Constitutional  · Appearance: well-nourished, well developed, alert, in no acute distress    HENT  · Head and Face: appears normal    Chest  · Respiratory Effort: non-labored breathing    Cardiovascular  · Extremities: no peripheral edema    Gastrointestinal  · Abdominal Examination: abdomen non-tender to palpation, normal bowel sounds, no masses present  · Liver and spleen: no hepatomegaly present, spleen not palpable  · Hernias: no hernias identified    Skin  · General Inspection: no rash, no lesions identified    Neurologic/Psychiatric  · Mental Status:  · Orientation: grossly oriented to person, place and time  · Mood and Affect: mood normal, affect appropriate       Assessment/Plan:  24 y.o. G1 at 7w6d by LMP presenting d/t N/V of pregnancy.  Currently tolerating PO.    -small freq meals, PNV with food later in day, brody, etc  -unisom/B6  -refill Rx for phenergan (pt to take this at nights and when she is not working)  -Rx for zofran (for pt to take during day, as is slightly less sedating)  -RTC: 2 weeks for LORI visit as scheduled (plan for ultrasound, exam, and new OB labs at that time), or sooner, hedy Ledezma MD  9/25/2017  2:04 PM

## 2017-09-26 ENCOUNTER — HOSPITAL ENCOUNTER (EMERGENCY)
Age: 22
Discharge: HOME | End: 2017-09-26
Attending: EMERGENCY MEDICINE
Payer: COMMERCIAL

## 2017-09-26 VITALS
TEMPERATURE: 98.2 F | HEART RATE: 81 BPM | OXYGEN SATURATION: 100 % | SYSTOLIC BLOOD PRESSURE: 119 MMHG | WEIGHT: 164 LBS | RESPIRATION RATE: 14 BRPM | BODY MASS INDEX: 28 KG/M2 | DIASTOLIC BLOOD PRESSURE: 69 MMHG | HEIGHT: 64 IN

## 2017-09-26 DIAGNOSIS — N39.0 URINARY TRACT INFECTION WITHOUT HEMATURIA, SITE UNSPECIFIED: ICD-10-CM

## 2017-09-26 DIAGNOSIS — E86.0 DEHYDRATION: ICD-10-CM

## 2017-09-26 DIAGNOSIS — O21.9 NAUSEA AND VOMITING OF PREGNANCY, ANTEPARTUM: Primary | ICD-10-CM

## 2017-09-26 LAB
APPEARANCE UR: ABNORMAL
BACTERIA URNS QL MICRO: ABNORMAL /HPF
BILIRUB UR QL: NEGATIVE
COLOR UR: ABNORMAL
EPITH CASTS URNS QL MICRO: ABNORMAL /LPF
GLUCOSE UR STRIP.AUTO-MCNC: NEGATIVE MG/DL
HGB UR QL STRIP: NEGATIVE
KETONES UR QL STRIP.AUTO: >80 MG/DL
LEUKOCYTE ESTERASE UR QL STRIP.AUTO: NEGATIVE
MUCOUS THREADS URNS QL MICRO: ABNORMAL /LPF
NITRITE UR QL STRIP.AUTO: NEGATIVE
PH UR STRIP: 6 [PH] (ref 5–8)
PROT UR STRIP-MCNC: 30 MG/DL
RBC #/AREA URNS HPF: ABNORMAL /HPF (ref 0–5)
SP GR UR REFRACTOMETRY: >1.03 (ref 1–1.03)
UA: UC IF INDICATED,UAUC: ABNORMAL
UROBILINOGEN UR QL STRIP.AUTO: 0.2 EU/DL (ref 0.2–1)
WBC URNS QL MICRO: ABNORMAL /HPF (ref 0–4)

## 2017-09-26 PROCEDURE — 96361 HYDRATE IV INFUSION ADD-ON: CPT

## 2017-09-26 PROCEDURE — 96375 TX/PRO/DX INJ NEW DRUG ADDON: CPT

## 2017-09-26 PROCEDURE — 81001 URINALYSIS AUTO W/SCOPE: CPT | Performed by: EMERGENCY MEDICINE

## 2017-09-26 PROCEDURE — 99283 EMERGENCY DEPT VISIT LOW MDM: CPT

## 2017-09-26 PROCEDURE — 96374 THER/PROPH/DIAG INJ IV PUSH: CPT

## 2017-09-26 PROCEDURE — 74011250636 HC RX REV CODE- 250/636: Performed by: EMERGENCY MEDICINE

## 2017-09-26 PROCEDURE — 87086 URINE CULTURE/COLONY COUNT: CPT | Performed by: EMERGENCY MEDICINE

## 2017-09-26 RX ORDER — DIPHENHYDRAMINE HCL 12.5MG/5ML
50 LIQUID (ML) ORAL
Qty: 120 ML | Refills: 0 | Status: SHIPPED | OUTPATIENT
Start: 2017-09-26 | End: 2017-09-26

## 2017-09-26 RX ORDER — DIPHENHYDRAMINE HCL 12.5MG/5ML
50 LIQUID (ML) ORAL
Qty: 120 ML | Refills: 0 | Status: SHIPPED | OUTPATIENT
Start: 2017-09-26 | End: 2017-10-06

## 2017-09-26 RX ORDER — DIPHENHYDRAMINE HYDROCHLORIDE 50 MG/ML
50 INJECTION, SOLUTION INTRAMUSCULAR; INTRAVENOUS
Status: COMPLETED | OUTPATIENT
Start: 2017-09-26 | End: 2017-09-26

## 2017-09-26 RX ORDER — NITROFURANTOIN 25; 75 MG/1; MG/1
100 CAPSULE ORAL 2 TIMES DAILY
Qty: 6 CAP | Refills: 0 | Status: SHIPPED | OUTPATIENT
Start: 2017-09-26 | End: 2017-09-26

## 2017-09-26 RX ORDER — ONDANSETRON 2 MG/ML
4 INJECTION INTRAMUSCULAR; INTRAVENOUS
Status: COMPLETED | OUTPATIENT
Start: 2017-09-26 | End: 2017-09-26

## 2017-09-26 RX ORDER — NITROFURANTOIN 25; 75 MG/1; MG/1
100 CAPSULE ORAL 2 TIMES DAILY
Qty: 6 CAP | Refills: 0 | Status: SHIPPED | OUTPATIENT
Start: 2017-09-26 | End: 2017-09-29

## 2017-09-26 RX ADMIN — ONDANSETRON 4 MG: 2 INJECTION INTRAMUSCULAR; INTRAVENOUS at 14:44

## 2017-09-26 RX ADMIN — SODIUM CHLORIDE 1000 ML: 900 INJECTION, SOLUTION INTRAVENOUS at 13:50

## 2017-09-26 RX ADMIN — SODIUM CHLORIDE 1000 ML: 900 INJECTION, SOLUTION INTRAVENOUS at 15:07

## 2017-09-26 RX ADMIN — DIPHENHYDRAMINE HYDROCHLORIDE 50 MG: 50 INJECTION, SOLUTION INTRAMUSCULAR; INTRAVENOUS at 13:50

## 2017-09-26 NOTE — ED NOTES
Pt c/o being pregnant and c/o not being able to eat or drink anything for about 2 weeks. Pt was taking zofran only once. Pt currently drinking water.

## 2017-09-26 NOTE — ED TRIAGE NOTES
Pt. C/o n/v for the past week, unable to keep anything down. Notes she is ~7weeks pregnant, G1. Denies any abd pain. No vaginal bleeding. Saw OB yesterday, was given zofran with no effect.

## 2017-09-26 NOTE — DISCHARGE INSTRUCTIONS
Extreme Nausea and Vomiting in Pregnancy: Care Instructions  Your Care Instructions  Nausea and vomiting (often called morning sickness) are common in pregnancy. They are caused by pregnancy hormones and happen most often in the first 3 months. Some women get very sick and are not able to keep down food and fluids. This extreme morning sickness is called hyperemesis gravidarum. It can lead to a dangerous loss of fluids in the body. It also can keep you from gaining weight and getting proper nutrition during your pregnancy. Your body fluids are put back in balance with water and minerals called electrolytes. Medicine may help if you have severe nausea and vomiting. Follow-up care is a key part of your treatment and safety. Be sure to make and go to all appointments, and call your doctor if you are having problems. It's also a good idea to know your test results and keep a list of the medicines you take. How can you care for yourself at home? · Take your medicines exactly as prescribed. Call your doctor if you think you are having a problem with your medicine. · Drink plenty of fluids to prevent dehydration. Choose water and other caffeine-free clear liquids until you feel better. Try sipping on sports drinks that have salt and sugar in them. · Eat a small snack, such as crackers, before you get out of bed. Wait a few minutes, then get out of bed slowly. · Keep food in your stomach, but not too much at once. An empty stomach can make nausea worse. Eat several small meals every day instead of three large meals. · Eat more protein and less fat. · Get plenty of vitamin B6 by eating whole grains, nuts, seeds, and legumes. You can take vitamin B6 tablets if your doctor says it is okay. · Try to avoid smells and foods that make you feel sick to your stomach. · Get lots of rest.  · You may want to try acupressure bands.  They put pressure on an acupressure point in the wrist. Some women feel better using the bands.  · Cynthia may also help you feel better. You can use it in tea, take it as a pill, or use a cynthia syrup that you can buy at a health food store. When should you call for help? Call 911 anytime you think you may need emergency care. For example, call if:  · You passed out (lost consciousness). Call your doctor now or seek immediate medical care if:  · You vomit more than 3 times in a day, especially if you also have a fever or pain. · You are too sick to your stomach to drink any fluids. · You have signs of needing more fluids. You have sunken eyes and a dry mouth, and you pass only a little dark urine. · Your morning sickness gets worse or does not get better with home care. · You are not able to keep down your medicine. Watch closely for changes in your health, and be sure to contact your doctor if you have any problems. Where can you learn more? Go to http://rika-chayo.info/. Enter M947 in the search box to learn more about \"Extreme Nausea and Vomiting in Pregnancy: Care Instructions. \"  Current as of: 2017  Content Version: 11.3  © 3143-0079 [employer]. Care instructions adapted under license by [employer] (which disclaims liability or warranty for this information). If you have questions about a medical condition or this instruction, always ask your healthcare professional. [place] [address] any warranty or liability for your use of this information. Urinary Tract Infection in Pregnancy: Care Instructions  Your Care Instructions    A urinary tract infection, or UTI, is an infection of the bladder and other urinary structures. Most UTIs occur in the bladder. They often cause pain or burning when you urinate. UTI is the most common bacterial infection in pregnancy. If untreated, a UTI could lead to problems such as a kidney infection or  labor. Most UTIs can be cured with antibiotics.  Your doctor will prescribe an antibiotic that is safe during pregnancy. Be sure to finish your medicine so that the infection does not spread to your kidneys. Follow-up care is a key part of your treatment and safety. Be sure to make and go to all appointments, and call your doctor if you are having problems. It's also a good idea to know your test results and keep a list of the medicines you take. How can you care for yourself at home? · Take your antibiotics as directed. Do not stop taking them just because you feel better. You need to take the full course of antibiotics. · Drink extra water and other fluids for the next day or two. This will help wash out the bacteria causing the infection. If you have kidney, heart, or liver disease and have to limit fluids, talk with your doctor before you increase the amount of fluids you drink. · Do not drink alcohol. · Urinate often. Try to empty your bladder each time. Preventing UTIs  · Drink plenty of fluids, enough so that your urine is light yellow or clear like water. This helps you urinate often, which clears bacteria from your system. If you have kidney, heart, or liver disease and have to limit fluids, talk with your doctor before you increase the amount of fluids you drink. · Urinate when you first have the urge. · Urinate right after you have sex. This is the best way for women to avoid UTIs. · When going to the bathroom, wipe from front to back to keep bacteria from entering the vagina or urethra. When should you call for help? Call your doctor now or seek immediate medical care if:  · Symptoms such as fever, chills, nausea, or vomiting get worse or appear for the first time. · You have new pain in your back just below your rib cage. This is called flank pain. · There is new blood or pus in your urine. · You have any problems with your antibiotic medicine.   Watch closely for changes in your health, and be sure to contact your doctor if:  · You are not getting better after 1 day (24 hours). · You have new symptoms, such as blood in your urine. Where can you learn more? Go to http://rika-chayo.info/. Enter M982 in the search box to learn more about \"Urinary Tract Infection in Pregnancy: Care Instructions. \"  Current as of: March 16, 2017  Content Version: 11.3  © 1198-1537 [employer]. Care instructions adapted under license by [employer] (which disclaims liability or warranty for this information). If you have questions about a medical condition or this instruction, always ask your healthcare professional. [place] [address] any warranty or liability for your use of this information. VITALS:   Patient Vitals for the past 8 hrs:   Temp Pulse Resp BP SpO2   09/26/17 1541 - 81 - 119/69 -   09/26/17 1248 98.2 °F (36.8 °C) 88 14 123/73 100 %                  Recent Results (from the past 24 hour(s))   URINALYSIS W/ REFLEX CULTURE    Collection Time: 09/26/17  1:28 PM   Result Value Ref Range    Color DARK YELLOW      Appearance CLOUDY (A) CLEAR      Specific gravity >1.030 (H) 1.003 - 1.030    pH (UA) 6.0 5.0 - 8.0      Protein 30 (A) NEG mg/dL    Glucose NEGATIVE  NEG mg/dL    Ketone >80 (A) NEG mg/dL    Bilirubin NEGATIVE  NEG      Blood NEGATIVE  NEG      Urobilinogen 0.2 0.2 - 1.0 EU/dL    Nitrites NEGATIVE  NEG      Leukocyte Esterase NEGATIVE  NEG      WBC 5-10 0 - 4 /hpf    RBC 0-5 0 - 5 /hpf    Epithelial cells MODERATE (A) FEW /lpf    Bacteria 2+ (A) NEG /hpf    UA:UC IF INDICATED URINE CULTURE ORDERED (A) CNI      Mucus TRACE (A) NEG /lpf       No results found.

## 2017-09-26 NOTE — LETTER
[address] 
[facility] EMERGENCY DEPT 
[address] [id number] [phone] Work/School Note Date: 9/26/2017 To Whom It May concern: 
 
Arnel Solis was seen and treated today with Render Corporal in the emergency room by the following provider(s): 
No providers found. Arnel Solis Sincerely, Xavier MAR RN

## 2017-09-26 NOTE — ED NOTES
Patient discharged by MD. Pt given the opportunity to ask questions, verbalized understanding of teaching.

## 2017-09-27 NOTE — ED PROVIDER NOTES
Patient is a 24 y.o. female presenting with vomiting and pregnancy problem. The history is provided by the patient. Vomiting    This is a recurrent problem. The current episode started more than 1 week ago. The problem has been gradually worsening. The emesis has an appearance of stomach contents. There has been no fever. Pertinent negatives include no chills, no abdominal pain and no diarrhea. Yes, the patient is pregnant. Pregnancy Problem    This is a new problem. The current episode started more than 1 week ago. The problem occurs constantly. The problem has been gradually worsening. Associated symptoms include nausea and vomiting. Pertinent negatives include no diarrhea, no dysuria and no frequency. Past Medical History:   Diagnosis Date    Anxiety     Chlamydia 10/2016    Depression     Headache     N&V (nausea and vomiting)     Routine Papanicolaou smear 02/09/2016    Nl with +yeast (media tab)     Snoring     SOB (shortness of breath)        Past Surgical History:   Procedure Laterality Date    HX TONSILLECTOMY      HX WISDOM TEETH EXTRACTION           Family History:   Problem Relation Age of Onset    No Known Problems Mother     Hypertension Father     No Known Problems Sister     No Known Problems Brother     Ovarian Cancer Maternal Aunt     Cancer Maternal Uncle      Lung     No Known Problems Paternal Aunt     No Known Problems Paternal Uncle     Cancer Maternal Grandmother      Lung     No Known Problems Maternal Grandfather     Diabetes Paternal Grandmother     Cancer Paternal Grandfather      Liver (?)    Cancer Other     Mental Retardation Other      Downs' syndrome       Social History     Social History    Marital status: SINGLE     Spouse name: N/A    Number of children: N/A    Years of education: N/A     Occupational History    Not on file.      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 Review of Systems   Constitutional: Negative for chills. Respiratory: Negative for chest tightness and shortness of breath. Gastrointestinal: Positive for nausea and vomiting. Negative for abdominal pain and diarrhea. Genitourinary: Negative for dysuria, frequency, vaginal bleeding and vaginal discharge. All other systems reviewed and are negative. Vitals:    09/26/17 1248 09/26/17 1541   BP: 123/73 119/69   Pulse: 88 81   Resp: 14    Temp: 98.2 °F (36.8 °C)    SpO2: 100%    Weight: 74.4 kg (164 lb)    Height: 5' 4\" (1.626 m)             Physical Exam   Constitutional: She is oriented to person, place, and time. She appears well-developed and well-nourished. No distress. HENT:   Head: Normocephalic and atraumatic. Eyes: Conjunctivae and EOM are normal.   Neck: Normal range of motion. Cardiovascular: Normal rate, regular rhythm, normal heart sounds and intact distal pulses. No murmur heard. Pulmonary/Chest: Effort normal and breath sounds normal. No stridor. No respiratory distress. Abdominal: Soft. Bowel sounds are normal. There is no tenderness. There is no rebound. Genitourinary:   Genitourinary Comments: Deferred,   Musculoskeletal: Normal range of motion. She exhibits no edema, tenderness or deformity. Neurological: She is alert and oriented to person, place, and time. No cranial nerve deficit. Skin: Skin is warm and dry. She is not diaphoretic. Psychiatric: She has a normal mood and affect. Nursing note and vitals reviewed.        MDM  Number of Diagnoses or Management Options  Dehydration:   Nausea and vomiting of pregnancy, antepartum:   Urinary tract infection without hematuria, site unspecified:   Diagnosis management comments: Patient with hyperemesis - give IVF and check urine.   Give antiemetics and re-eval.    Patient improved with IVF and meds, will treat urine for infection, refer her back to OB       Amount and/or Complexity of Data Reviewed  Clinical lab tests: ordered and reviewed    Patient Progress  Patient progress: improved    ED Course       Procedures

## 2017-09-28 ENCOUNTER — TELEPHONE (OUTPATIENT)
Dept: OBGYN CLINIC | Age: 22
End: 2017-09-28

## 2017-09-28 LAB
BACTERIA SPEC CULT: NORMAL
CC UR VC: NORMAL
SERVICE CMNT-IMP: NORMAL

## 2017-09-28 NOTE — TELEPHONE ENCOUNTER
24year old pregnant patient last seen on 09/25/17 calling to see if there is something else she can take for nausea. Patient states she takes phenergan at bedtime but needs something for during the day. She has taken zofran twice and threw up both times after taking it. Her insurance does not cover medications. She was specifically asking for a zofran shot? ?     We have a form that we can fill out for her - get your signature and then submit it to get diclegis for $1. Do you want Karla to start this?

## 2017-09-29 DIAGNOSIS — O21.9 NAUSEA AND VOMITING OF PREGNANCY, ANTEPARTUM: Primary | ICD-10-CM

## 2017-09-29 RX ORDER — DOXYLAMINE SUCCINATE AND PYRIDOXINE HYDROCHLORIDE, DELAYED RELEASE TABLETS 10 MG/10 MG 10; 10 MG/1; MG/1
TABLET, DELAYED RELEASE ORAL
Qty: 112 TAB | Refills: 2 | Status: SHIPPED | OUTPATIENT
Start: 2017-09-29 | End: 2017-10-02

## 2017-09-29 NOTE — TELEPHONE ENCOUNTER
Patient has [employer]. She has prescription coverage. Diclegis can be sent to her pharmacy. If it requires authorization the pharmacy will contact me.

## 2017-10-02 ENCOUNTER — HOSPITAL ENCOUNTER (EMERGENCY)
Age: 22
Discharge: HOME | End: 2017-10-02
Attending: EMERGENCY MEDICINE
Payer: COMMERCIAL

## 2017-10-02 VITALS
HEIGHT: 64 IN | DIASTOLIC BLOOD PRESSURE: 76 MMHG | RESPIRATION RATE: 14 BRPM | OXYGEN SATURATION: 96 % | SYSTOLIC BLOOD PRESSURE: 137 MMHG | TEMPERATURE: 98 F | WEIGHT: 164 LBS | BODY MASS INDEX: 28 KG/M2 | HEART RATE: 105 BPM

## 2017-10-02 DIAGNOSIS — O21.1 HYPEREMESIS GRAVIDARUM BEFORE END OF 22 WEEK GESTATION, DEHYDRATION: Primary | ICD-10-CM

## 2017-10-02 PROCEDURE — 96374 THER/PROPH/DIAG INJ IV PUSH: CPT

## 2017-10-02 PROCEDURE — 74011250636 HC RX REV CODE- 250/636: Performed by: NURSE PRACTITIONER

## 2017-10-02 PROCEDURE — 99284 EMERGENCY DEPT VISIT MOD MDM: CPT

## 2017-10-02 PROCEDURE — 96361 HYDRATE IV INFUSION ADD-ON: CPT

## 2017-10-02 RX ORDER — ONDANSETRON 2 MG/ML
4 INJECTION INTRAMUSCULAR; INTRAVENOUS
Status: COMPLETED | OUTPATIENT
Start: 2017-10-02 | End: 2017-10-02

## 2017-10-02 RX ADMIN — ONDANSETRON 4 MG: 2 INJECTION INTRAMUSCULAR; INTRAVENOUS at 16:51

## 2017-10-02 RX ADMIN — SODIUM CHLORIDE 1000 ML: 900 INJECTION, SOLUTION INTRAVENOUS at 16:52

## 2017-10-02 NOTE — ED PROVIDER NOTES
HPI Comments: Efra Neely is a 24 y.o. female who presents ambulatory with her boyfriend to the ED with  c/o ongoing nausea and vomiting. Patient states she is nauseous all of the time and vomits constantly. Patient is 8 weeks pregnant. . Patient states she has seen [facility] and [facility] in the past two weeks due to increased nausea and vomiting. States she called her OB/GYN who told her \"this is part of pregnancy. \" Patient arrives in the emergency room stating \"vomiting this much can not be normal.\" Patient denies any cramping, denies vaginal bleeding. No other acute medical needs at this time. PCP: Evangelina James NP  Past Medical History:  No date: Anxiety  10/2016: Chlamydia  No date: Depression  No date: Headache  No date: N&V (nausea and vomiting)  2016: Routine Papanicolaou smear      Comment: Nl with +yeast (media tab)   No date: Snoring  No date: SOB (shortness of breath)  Past Surgical History:  No date: HX TONSILLECTOMY  No date: HX WISDOM TEETH EXTRACTION    Social Hx: Tobacco: none EtOH: none Illicit drug use: none    There are no further complaints or symptoms at this time. The history is provided by the patient.         Past Medical History:   Diagnosis Date    Anxiety     Chlamydia 10/2016    Depression     Headache     N&V (nausea and vomiting)     Routine Papanicolaou smear 2016    Nl with +yeast (media tab)     Snoring     SOB (shortness of breath)        Past Surgical History:   Procedure Laterality Date    HX TONSILLECTOMY      HX WISDOM TEETH EXTRACTION           Family History:   Problem Relation Age of Onset    No Known Problems Mother     Hypertension Father     No Known Problems Sister     No Known Problems Brother     Ovarian Cancer Maternal Aunt     Cancer Maternal Uncle      Lung     No Known Problems Paternal Aunt     No Known Problems Paternal Uncle     Cancer Maternal Grandmother      Lung     No Known Problems Maternal Grandfather  Diabetes Paternal Grandmother     Cancer Paternal Grandfather      Liver (?)    Cancer Other     Mental Retardation Other      Downs' syndrome       Social History     Social History    Marital status: SINGLE     Spouse name: N/A    Number of children: N/A    Years of education: N/A     Occupational History    Not on file. 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 Review of Systems   Constitutional: Positive for appetite change (states positive nausea and vomiting multiple times throughout the day. ). Negative for chills, diaphoresis, fatigue and fever. HENT: Negative for congestion, ear discharge, ear pain, sinus pain, sinus pressure, sore throat and trouble swallowing. Eyes: Negative for photophobia, pain, redness and visual disturbance. Respiratory: Negative for chest tightness, shortness of breath and wheezing. Cardiovascular: Negative for chest pain and palpitations. Gastrointestinal: Positive for nausea and vomiting. Negative for abdominal distention and abdominal pain. Endocrine: Negative. Genitourinary: Negative for difficulty urinating, flank pain, frequency, urgency, vaginal bleeding, vaginal discharge and vaginal pain. Musculoskeletal: Negative for back pain, neck pain and neck stiffness. Skin: Negative for color change, pallor, rash and wound. Allergic/Immunologic: Negative. Neurological: Negative for dizziness, speech difficulty, weakness and headaches. Hematological: Does not bruise/bleed easily.    Psychiatric/Behavioral: Negative for behavioral problems. The patient is not nervous/anxious. Vitals:    10/02/17 1523   BP: 115/76   Pulse: (!) 105   Resp: 14   Temp: 98 °F (36.7 °C)   SpO2: 100%   Weight: 74.4 kg (164 lb)   Height: 5' 4\" (1.626 m)            Physical Exam   Constitutional: She is oriented to person, place, and time. She appears well-developed and well-nourished. No distress. HENT:   Head: Normocephalic and atraumatic. Right Ear: External ear normal.   Left Ear: External ear normal.   Nose: Nose normal.   Mouth/Throat: Oropharynx is clear and moist.   Eyes: Conjunctivae and EOM are normal. Pupils are equal, round, and reactive to light. Right eye exhibits no discharge. Left eye exhibits no discharge. Neck: Normal range of motion. Neck supple. No JVD present. No tracheal deviation present. Cardiovascular: Regular rhythm, normal heart sounds and intact distal pulses. Exam reveals no gallop. No murmur heard. Patient with tachycardia 100's   Pulmonary/Chest: Effort normal and breath sounds normal. No respiratory distress. She has no wheezes. She has no rales. She exhibits no tenderness. Abdominal: Soft. Bowel sounds are normal. She exhibits no distension. There is no tenderness. There is no rebound and no guarding. Genitourinary:   Genitourinary Comments: Negative. Musculoskeletal: Normal range of motion. She exhibits no edema or tenderness. Neurological: She is alert and oriented to person, place, and time. Skin: Skin is warm and dry. No rash noted. No erythema. No pallor. Psychiatric: She has a normal mood and affect. Her behavior is normal. Judgment and thought content normal.   Nursing note and vitals reviewed. MDM  Number of Diagnoses or Management Options  Hyperemesis gravidarum before end of 22 week gestation, dehydration:   Diagnosis management comments: Plan:  Discharge to home and follow up with OB/GYN      ED Course   1650: Initial assessment of patient as documented.   1758: reassessment of patient. Patient states less nausea, no vomiting. 6:13 PM  Pt has been reexamined. Pt has no new complaints, changes or physical findings. Care plan outlined and precautions discussed. All available results were reviewed with pt. All medications were reviewed with pt. All of pt's questions and concerns were addressed. Pt agrees to F/U as instructed and agrees to return to ED upon further deterioration. Pt is ready to go home.   Renata Saliva, NP      Procedures

## 2017-10-02 NOTE — DISCHARGE INSTRUCTIONS
Extreme Nausea and Vomiting in Pregnancy: Care Instructions  Your Care Instructions  Nausea and vomiting (often called morning sickness) are common in pregnancy. They are caused by pregnancy hormones and happen most often in the first 3 months. Some women get very sick and are not able to keep down food and fluids. This extreme morning sickness is called hyperemesis gravidarum. It can lead to a dangerous loss of fluids in the body. It also can keep you from gaining weight and getting proper nutrition during your pregnancy. Your body fluids are put back in balance with water and minerals called electrolytes. Medicine may help if you have severe nausea and vomiting. Follow-up care is a key part of your treatment and safety. Be sure to make and go to all appointments, and call your doctor if you are having problems. It's also a good idea to know your test results and keep a list of the medicines you take. How can you care for yourself at home? · Take your medicines exactly as prescribed. Call your doctor if you think you are having a problem with your medicine. · Drink plenty of fluids to prevent dehydration. Choose water and other caffeine-free clear liquids until you feel better. Try sipping on sports drinks that have salt and sugar in them. · Eat a small snack, such as crackers, before you get out of bed. Wait a few minutes, then get out of bed slowly. · Keep food in your stomach, but not too much at once. An empty stomach can make nausea worse. Eat several small meals every day instead of three large meals. · Eat more protein and less fat. · Get plenty of vitamin B6 by eating whole grains, nuts, seeds, and legumes. You can take vitamin B6 tablets if your doctor says it is okay. · Try to avoid smells and foods that make you feel sick to your stomach. · Get lots of rest.  · You may want to try acupressure bands.  They put pressure on an acupressure point in the wrist. Some women feel better using the bands.  · Cynthia may also help you feel better. You can use it in tea, take it as a pill, or use a cynthia syrup that you can buy at a health food store. When should you call for help? Call 911 anytime you think you may need emergency care. For example, call if:  · You passed out (lost consciousness). Call your doctor now or seek immediate medical care if:  · You vomit more than 3 times in a day, especially if you also have a fever or pain. · You are too sick to your stomach to drink any fluids. · You have signs of needing more fluids. You have sunken eyes and a dry mouth, and you pass only a little dark urine. · Your morning sickness gets worse or does not get better with home care. · You are not able to keep down your medicine. Watch closely for changes in your health, and be sure to contact your doctor if you have any problems. Where can you learn more? Go to http://rika-chayo.info/. Enter N813 in the search box to learn more about \"Extreme Nausea and Vomiting in Pregnancy: Care Instructions. \"  Current as of: March 16, 2017  Content Version: 11.3  © 2377-5137 [employer]. Care instructions adapted under license by [employer] (which disclaims liability or warranty for this information). If you have questions about a medical condition or this instruction, always ask your healthcare professional. [place] [address] any warranty or liability for your use of this information.

## 2017-10-03 ENCOUNTER — ROUTINE PRENATAL (OUTPATIENT)
Dept: OBGYN CLINIC | Age: 22
End: 2017-10-03

## 2017-10-03 ENCOUNTER — TELEPHONE (OUTPATIENT)
Dept: OBGYN CLINIC | Age: 22
End: 2017-10-03

## 2017-10-03 ENCOUNTER — HOSPITAL ENCOUNTER (OUTPATIENT)
Age: 22
Setting detail: OBSERVATION
Discharge: HOME | End: 2017-10-05
Attending: OBSTETRICS & GYNECOLOGY | Admitting: OBSTETRICS & GYNECOLOGY
Payer: COMMERCIAL

## 2017-10-03 VITALS
WEIGHT: 154 LBS | RESPIRATION RATE: 18 BRPM | DIASTOLIC BLOOD PRESSURE: 80 MMHG | BODY MASS INDEX: 26.43 KG/M2 | SYSTOLIC BLOOD PRESSURE: 122 MMHG

## 2017-10-03 DIAGNOSIS — Z3A.08 8 WEEKS GESTATION OF PREGNANCY: ICD-10-CM

## 2017-10-03 DIAGNOSIS — Z3A.09 9 WEEKS GESTATION OF PREGNANCY: ICD-10-CM

## 2017-10-03 DIAGNOSIS — O21.0 HYPEREMESIS GRAVIDARUM: ICD-10-CM

## 2017-10-03 DIAGNOSIS — O21.1 HYPEREMESIS GRAVIDARUM WITH METABOLIC DISTURBANCE, ANTEPARTUM: Primary | ICD-10-CM

## 2017-10-03 LAB
ALBUMIN SERPL-MCNC: 4.2 G/DL (ref 3.5–5)
ALBUMIN/GLOB SERPL: 1.3 {RATIO} (ref 1.1–2.2)
ALP SERPL-CCNC: 62 U/L (ref 45–117)
ALT SERPL-CCNC: 23 U/L (ref 12–78)
ANION GAP SERPL CALC-SCNC: 12 MMOL/L (ref 5–15)
AST SERPL-CCNC: 13 U/L (ref 15–37)
BILIRUB SERPL-MCNC: 0.7 MG/DL (ref 0.2–1)
BUN SERPL-MCNC: 9 MG/DL (ref 6–20)
BUN/CREAT SERPL: 17 (ref 12–20)
CALCIUM SERPL-MCNC: 9.6 MG/DL (ref 8.5–10.1)
CHLORIDE SERPL-SCNC: 103 MMOL/L (ref 97–108)
CO2 SERPL-SCNC: 21 MMOL/L (ref 21–32)
CREAT SERPL-MCNC: 0.54 MG/DL (ref 0.55–1.02)
ERYTHROCYTE [DISTWIDTH] IN BLOOD BY AUTOMATED COUNT: 12.5 % (ref 11.5–14.5)
GLOBULIN SER CALC-MCNC: 3.2 G/DL (ref 2–4)
GLUCOSE SERPL-MCNC: 96 MG/DL (ref 65–100)
HCT VFR BLD AUTO: 39.9 % (ref 35–47)
HGB BLD-MCNC: 14.3 G/DL (ref 11.5–16)
MCH RBC QN AUTO: 29.7 PG (ref 26–34)
MCHC RBC AUTO-ENTMCNC: 35.8 G/DL (ref 30–36.5)
MCV RBC AUTO: 83 FL (ref 80–99)
PLATELET # BLD AUTO: 264 K/UL (ref 150–400)
POTASSIUM SERPL-SCNC: 3.5 MMOL/L (ref 3.5–5.1)
PROT SERPL-MCNC: 7.4 G/DL (ref 6.4–8.2)
RBC # BLD AUTO: 4.81 M/UL (ref 3.8–5.2)
SODIUM SERPL-SCNC: 136 MMOL/L (ref 136–145)
TSH SERPL DL<=0.05 MIU/L-ACNC: 0.46 UIU/ML (ref 0.36–3.74)
WBC # BLD AUTO: 13 K/UL (ref 3.6–11)

## 2017-10-03 PROCEDURE — 74011250637 HC RX REV CODE- 250/637: Performed by: OBSTETRICS & GYNECOLOGY

## 2017-10-03 PROCEDURE — 74011250636 HC RX REV CODE- 250/636: Performed by: OBSTETRICS & GYNECOLOGY

## 2017-10-03 PROCEDURE — 96374 THER/PROPH/DIAG INJ IV PUSH: CPT

## 2017-10-03 PROCEDURE — 80053 COMPREHEN METABOLIC PANEL: CPT | Performed by: OBSTETRICS & GYNECOLOGY

## 2017-10-03 PROCEDURE — 36415 COLL VENOUS BLD VENIPUNCTURE: CPT | Performed by: OBSTETRICS & GYNECOLOGY

## 2017-10-03 PROCEDURE — 85027 COMPLETE CBC AUTOMATED: CPT | Performed by: OBSTETRICS & GYNECOLOGY

## 2017-10-03 PROCEDURE — 96376 TX/PRO/DX INJ SAME DRUG ADON: CPT

## 2017-10-03 PROCEDURE — 99218 HC RM OBSERVATION: CPT

## 2017-10-03 PROCEDURE — 96375 TX/PRO/DX INJ NEW DRUG ADDON: CPT

## 2017-10-03 PROCEDURE — 84443 ASSAY THYROID STIM HORMONE: CPT | Performed by: OBSTETRICS & GYNECOLOGY

## 2017-10-03 PROCEDURE — 74011000250 HC RX REV CODE- 250: Performed by: OBSTETRICS & GYNECOLOGY

## 2017-10-03 PROCEDURE — 74011000258 HC RX REV CODE- 258: Performed by: OBSTETRICS & GYNECOLOGY

## 2017-10-03 PROCEDURE — 96361 HYDRATE IV INFUSION ADD-ON: CPT

## 2017-10-03 RX ORDER — FAMOTIDINE 10 MG/ML
20 INJECTION INTRAVENOUS EVERY 12 HOURS
Status: DISCONTINUED | OUTPATIENT
Start: 2017-10-03 | End: 2017-10-04

## 2017-10-03 RX ORDER — SODIUM CHLORIDE 0.9 % (FLUSH) 0.9 %
SYRINGE (ML) INJECTION
Status: DISPENSED
Start: 2017-10-03 | End: 2017-10-04

## 2017-10-03 RX ORDER — ONDANSETRON 2 MG/ML
4 INJECTION INTRAMUSCULAR; INTRAVENOUS
Status: DISCONTINUED | OUTPATIENT
Start: 2017-10-03 | End: 2017-10-04

## 2017-10-03 RX ORDER — LANOLIN ALCOHOL/MO/W.PET/CERES
100 CREAM (GRAM) TOPICAL DAILY
Status: DISCONTINUED | OUTPATIENT
Start: 2017-10-04 | End: 2017-10-05 | Stop reason: HOSPADM

## 2017-10-03 RX ORDER — SODIUM CHLORIDE 0.9 % (FLUSH) 0.9 %
SYRINGE (ML) INJECTION
Status: COMPLETED
Start: 2017-10-03 | End: 2017-10-03

## 2017-10-03 RX ORDER — FAMOTIDINE 10 MG/ML
20 INJECTION INTRAVENOUS EVERY 12 HOURS
Status: DISCONTINUED | OUTPATIENT
Start: 2017-10-03 | End: 2017-10-03

## 2017-10-03 RX ORDER — SODIUM CHLORIDE 9 MG/ML
INJECTION INTRAMUSCULAR; INTRAVENOUS; SUBCUTANEOUS
Status: DISPENSED
Start: 2017-10-03 | End: 2017-10-04

## 2017-10-03 RX ORDER — DEXTROSE MONOHYDRATE AND SODIUM CHLORIDE 5; .9 G/100ML; G/100ML
75 INJECTION, SOLUTION INTRAVENOUS CONTINUOUS
Status: DISCONTINUED | OUTPATIENT
Start: 2017-10-03 | End: 2017-10-05 | Stop reason: HOSPADM

## 2017-10-03 RX ORDER — AMOXICILLIN 250 MG
2 CAPSULE ORAL
Status: DISCONTINUED | OUTPATIENT
Start: 2017-10-03 | End: 2017-10-05 | Stop reason: HOSPADM

## 2017-10-03 RX ADMIN — FAMOTIDINE 20 MG: 10 INJECTION, SOLUTION INTRAVENOUS at 17:41

## 2017-10-03 RX ADMIN — Medication 10 ML: at 21:04

## 2017-10-03 RX ADMIN — ONDANSETRON 4 MG: 2 INJECTION INTRAMUSCULAR; INTRAVENOUS at 21:04

## 2017-10-03 RX ADMIN — DEXTROSE MONOHYDRATE AND SODIUM CHLORIDE 125 ML/HR: 5; .9 INJECTION, SOLUTION INTRAVENOUS at 17:27

## 2017-10-03 RX ADMIN — PROMETHAZINE HYDROCHLORIDE 12.5 MG: 25 INJECTION INTRAMUSCULAR; INTRAVENOUS at 23:54

## 2017-10-03 RX ADMIN — DOCUSATE SODIUM AND SENNOSIDES 2 TABLET: 8.6; 5 TABLET, FILM COATED ORAL at 21:46

## 2017-10-03 RX ADMIN — PROMETHAZINE HYDROCHLORIDE 12.5 MG: 25 INJECTION INTRAMUSCULAR; INTRAVENOUS at 17:45

## 2017-10-03 RX ADMIN — DEXTROSE MONOHYDRATE AND SODIUM CHLORIDE 125 ML/HR: 5; .9 INJECTION, SOLUTION INTRAVENOUS at 18:24

## 2017-10-03 NOTE — TELEPHONE ENCOUNTER
Called received at 10:07am      24year old patient  9w pregnant. Patient last seen in the office on 17. Patient calling to say that she was in the ER here at [facility] last night for dehydration. Patient denies vaginal bleeding and cramping. Patient states that she can not even keep water down. Patient reports that the Zofran and phenergan are not helping. Patient placed on the schedule to be seen at 2:40pm today as per Dr. Yesenia Sandoval. Patient verbalized understanding.

## 2017-10-03 NOTE — PROGRESS NOTES
Urine dip: Ketones 4+    TV ULTRASOUND PERFORMED  A SINGLE VIABLE 8W6D WITH ANDER OF 05/08/2018 IUP IS SEEN WITH NORMAL CARDIAC RHYTHM. GESTATIONAL AGE BASED ON ULTRASOUND. A NORMAL YOLK SAC IS SEEN. BILATERAL OVARIES NOT SEEN DUE TO BOWEL GAS. NO FREE FLUID IS SEEN IN THE CDS. Pt with severe nausea and vomiting of pregnancy. 2 recent ER visits for hyperemesis. Pt has lost 15lbs since onset of pregnancy. Reports 15-20 episodes of emesis daily. She cannot keep down any PO nutrition or fluids for many days. She has large ketones on urine dip. Given severe hyperemesis, will admit patient to antepartum for IV fluids, anti-emetics (IV phenergan/zofran scheduled overnight), basic labs (CBC, BMP, TSH), stool softeners, antacids, etc. Daily weights, FHTs daily.      Cedric Lorenzo MD  10/3/2017  4:30 PM

## 2017-10-03 NOTE — IP AVS SNAPSHOT
[address] [place] [address] 
[phone] Patient: Gaston Inman MRN: [id number] :1995 You are allergic to the following No active allergies Recent Documentation Height Weight Breastfeeding? BMI OB Status Smoking Status 1.626 m 69.9 kg No 26.43 kg/m2 Pregnant Former Smoker Unresulted Labs Order Current Status CULTURE, URINE In process Emergency Contacts Name Discharge Info Relation Home Work Mobile Eusebio Hodge DISCHARGE CAREGIVER [3] Friend [5] [phone] Heydi Rico CAREGIVER [3] Friend [5]   [phone] About your hospitalization You were admitted on:  October 3, 2017 You last received care in the:  [facility] 3 LABOR & DELIVERY You were discharged on:  2017 Unit phone number:  [phone] Why you were hospitalized Your primary diagnosis was:  Not on File Your diagnoses also included:  Hyperemesis Affecting Pregnancy, Antepartum Providers Seen During Your Hospitalizations Provider Role Specialty Primary office phone Amy Reddy MD Attending Provider Obstetrics & Gynecology [phone] Your Primary Care Physician (PCP) Primary Care Physician Office Phone Office Fax Sujatha Eckertpancho N [phone] ** None ** Follow-up Information Follow up With Details Comments Contact Info Amy Reddy MD In 2 weeks or sooner if needed [place] [address] [address] [place] [address] 
[phone] Your Appointments   2:30 PM EDT ULTRASOUND with ULTRASOUND1, [facility] AT [address] [place] [address]) [address]  
[phone]   3:00 PM EDT FOLLOW UP 10 with Amy Reddy MD  
[facility] AT [address] [place] [address]) [address] [facility]  
[phone] Current Discharge Medication List  
  
CONTINUE these medications which have NOT CHANGED Dose & Instructions Dispensing Information Comments Morning Noon Evening Bedtime diphenhydrAMINE 12.5 mg/5 mL syrup Commonly known as:  BENADRYL ALLERGY Your last dose was: Your next dose is:    
   
   
 Dose:  50 mg Take 20 mL by mouth four (4) times daily as needed for up to 10 days. Indications: NAUSEA AND VOMITING Quantity:  120 mL Refills:  0 Iron 325 mg (65 mg iron) tablet Generic drug:  ferrous sulfate Your last dose was: Your next dose is: Take  by mouth Daily (before breakfast). Refills:  0  
     
   
   
   
  
 ondansetron 4 mg disintegrating tablet Commonly known as:  ZOFRAN ODT Your last dose was: Your next dose is:    
   
   
 Dose:  4 mg Take 1 Tab by mouth every eight (8) hours as needed for Nausea. Quantity:  30 Tab Refills:  2 PRENATAL DHA+COMPLETE PRENATAL -300 mg-mcg-mg Cmpk Generic drug:  [id number] aj-folic-dha Your last dose was: Your next dose is: Take  by mouth. Refills:  0  
     
   
   
   
  
 promethazine 12.5 mg tablet Commonly known as:  PHENERGAN Your last dose was: Your next dose is:    
   
   
 Dose:  12.5 mg Take 1 Tab by mouth every six (6) hours as needed for Nausea. Quantity:  30 Tab Refills:  2 VITAMIN B-6 PO Your last dose was: Your next dose is: Take  by mouth. Refills:  0 Discharge Instructions Extreme Nausea and Vomiting in Pregnancy: Care Instructions Your Care Instructions Nausea and vomiting (often called morning sickness) are common in pregnancy.  They are caused by pregnancy hormones and happen most often in the first 3 months. Some women get very sick and are not able to keep down food and fluids. This extreme morning sickness is called hyperemesis gravidarum. It can lead to a dangerous loss of fluids in the body. It also can keep you from gaining weight and getting proper nutrition during your pregnancy. Your body fluids are put back in balance with water and minerals called electrolytes. Medicine may help if you have severe nausea and vomiting. Follow-up care is a key part of your treatment and safety. Be sure to make and go to all appointments, and call your doctor if you are having problems. It's also a good idea to know your test results and keep a list of the medicines you take. How can you care for yourself at home? · Take your medicines exactly as prescribed. Call your doctor if you think you are having a problem with your medicine. · Drink plenty of fluids to prevent dehydration. Choose water and other caffeine-free clear liquids until you feel better. Try sipping on sports drinks that have salt and sugar in them. · Eat a small snack, such as crackers, before you get out of bed. Wait a few minutes, then get out of bed slowly. · Keep food in your stomach, but not too much at once. An empty stomach can make nausea worse. Eat several small meals every day instead of three large meals. · Eat more protein and less fat. · Get plenty of vitamin B6 by eating whole grains, nuts, seeds, and legumes. You can take vitamin B6 tablets if your doctor says it is okay. · Try to avoid smells and foods that make you feel sick to your stomach. · Get lots of rest. 
· You may want to try acupressure bands. They put pressure on an acupressure point in the wrist. Some women feel better using the bands. · Cynthia may also help you feel better. You can use it in tea, take it as a pill, or use a cynthia syrup that you can buy at a health food store. When should you call for help? Call 911 anytime you think you may need emergency care. For example, call if: 
· You passed out (lost consciousness). Call your doctor now or seek immediate medical care if: 
· You vomit more than 3 times in a day, especially if you also have a fever or pain. · You are too sick to your stomach to drink any fluids. · You have signs of needing more fluids. You have sunken eyes and a dry mouth, and you pass only a little dark urine. · Your morning sickness gets worse or does not get better with home care. · You are not able to keep down your medicine. Watch closely for changes in your health, and be sure to contact your doctor if you have any problems. Where can you learn more? Go to http://rika-chayo.info/. Enter T450 in the search box to learn more about \"Extreme Nausea and Vomiting in Pregnancy: Care Instructions. \" Current as of: March 16, 2017 Content Version: 11.3 © 6397-0436 [employer]. Care instructions adapted under license by [employer] (which disclaims liability or warranty for this information). If you have questions about a medical condition or this instruction, always ask your healthcare professional. [address] any warranty or liability for your use of this information. Discharge Instructions Attachments/References PREGNANCY: HYPEREMESIS GRAVIDARUM (ENGLISH) Discharge Orders None [facility] Communication Announcement We are excited to announce that we are making your provider's discharge notes available to you in [employer]. You will see these notes when they are completed and signed by the physician that discharged you from your recent hospital stay. If you have any questions or concerns about any information you see in [employer], please call the Health Information Department where you were seen or reach out to your Primary Care Provider for more information about your plan of care. Introducing [facility]! Dear Kieran Fernández: Thank you for requesting a [employer] account. Our records indicate that you already have an active [employer] account. You can access your account anytime at [email or web address] Did you know that you can access your hospital and ER discharge instructions at any time in [employer]? You can also review all of your test results from your hospital stay or ER visit. Additional Information If you have questions, please visit the Frequently Asked Questions section of the [employer] website at [email or web address] Remember, [employer] is NOT to be used for urgent needs. For medical emergencies, dial 911. Now available from your iPhone and Android! General Information Please provide this summary of care documentation to your next provider. Patient Signature:  ____________________________________________________________ Date:  ____________________________________________________________  
  
Emelia Montgomery Provider Signature:  ____________________________________________________________ Date:  ____________________________________________________________ More Information Extreme Nausea and Vomiting in Pregnancy: Care Instructions Your Care Instructions Nausea and vomiting (often called morning sickness) are common in pregnancy. They are caused by pregnancy hormones and happen most often in the first 3 months. Some women get very sick and are not able to keep down food and fluids. This extreme morning sickness is called hyperemesis gravidarum. It can lead to a dangerous loss of fluids in the body. It also can keep you from gaining weight and getting proper nutrition during your pregnancy. Your body fluids are put back in balance with water and minerals called electrolytes. Medicine may help if you have severe nausea and vomiting. Follow-up care is a key part of your treatment and safety.  Be sure to make and go to all appointments, and call your doctor if you are having problems. It's also a good idea to know your test results and keep a list of the medicines you take. How can you care for yourself at home? · Take your medicines exactly as prescribed. Call your doctor if you think you are having a problem with your medicine. · Drink plenty of fluids to prevent dehydration. Choose water and other caffeine-free clear liquids until you feel better. Try sipping on sports drinks that have salt and sugar in them. · Eat a small snack, such as crackers, before you get out of bed. Wait a few minutes, then get out of bed slowly. · Keep food in your stomach, but not too much at once. An empty stomach can make nausea worse. Eat several small meals every day instead of three large meals. · Eat more protein and less fat. · Get plenty of vitamin B6 by eating whole grains, nuts, seeds, and legumes. You can take vitamin B6 tablets if your doctor says it is okay. · Try to avoid smells and foods that make you feel sick to your stomach. · Get lots of rest. 
· You may want to try acupressure bands. They put pressure on an acupressure point in the wrist. Some women feel better using the bands. · Cynthia may also help you feel better. You can use it in tea, take it as a pill, or use a cynthia syrup that you can buy at a health food store. When should you call for help? Call 911 anytime you think you may need emergency care. For example, call if: 
· You passed out (lost consciousness). Call your doctor now or seek immediate medical care if: 
· You vomit more than 3 times in a day, especially if you also have a fever or pain. · You are too sick to your stomach to drink any fluids. · You have signs of needing more fluids. You have sunken eyes and a dry mouth, and you pass only a little dark urine. · Your morning sickness gets worse or does not get better with home care. · You are not able to keep down your medicine. Watch closely for changes in your health, and be sure to contact your doctor if you have any problems. Where can you learn more? Go to http://rika-chayo.info/. Enter V743 in the search box to learn more about \"Extreme Nausea and Vomiting in Pregnancy: Care Instructions. \" Current as of: March 16, 2017 Content Version: 11.3 © 1635-3751 [employer]. Care instructions adapted under license by [employer] (which disclaims liability or warranty for this information). If you have questions about a medical condition or this instruction, always ask your healthcare professional. [place] [address] any warranty or liability for your use of this information.

## 2017-10-03 NOTE — PROGRESS NOTES
[address] Doshi admitted s/o Dr. Scarlett Cavanaugh with hyperemesis. 46  Dr. Scarlett Cavanaugh at bedside and orders received.   1725  IV started left arm and bolus infusing  Hourly rounds [id number]

## 2017-10-03 NOTE — IP AVS SNAPSHOT
[address] 
[phone] Patient: Ernie Lindo MRN: [id number] :1995 Current Discharge Medication List  
  
CONTINUE these medications which have NOT CHANGED Dose & Instructions Dispensing Information Comments Morning Noon Evening Bedtime diphenhydrAMINE 12.5 mg/5 mL syrup Commonly known as:  BENADRYL ALLERGY Your last dose was: Your next dose is:    
   
   
 Dose:  50 mg Take 20 mL by mouth four (4) times daily as needed for up to 10 days. Indications: NAUSEA AND VOMITING Quantity:  120 mL Refills:  0 Iron 325 mg (65 mg iron) tablet Generic drug:  ferrous sulfate Your last dose was: Your next dose is: Take  by mouth Daily (before breakfast). Refills:  0  
     
   
   
   
  
 ondansetron 4 mg disintegrating tablet Commonly known as:  ZOFRAN ODT Your last dose was: Your next dose is:    
   
   
 Dose:  4 mg Take 1 Tab by mouth every eight (8) hours as needed for Nausea. Quantity:  30 Tab Refills:  2 PRENATAL DHA+COMPLETE PRENATAL 30975-300 mg-mcg-mg Cmpk Generic drug:  [id number] aj-folic-dha Your last dose was: Your next dose is: Take  by mouth. Refills:  0  
     
   
   
   
  
 promethazine 12.5 mg tablet Commonly known as:  PHENERGAN Your last dose was: Your next dose is:    
   
   
 Dose:  12.5 mg Take 1 Tab by mouth every six (6) hours as needed for Nausea. Quantity:  30 Tab Refills:  2 VITAMIN B-6 PO Your last dose was: Your next dose is: Take  by mouth. Refills:  0

## 2017-10-03 NOTE — H&P
History & Physical    Name: Shiv Bowie MRN: [id number]  SSN: [id number]    YOB: 1995  Age: 24 y.o. Sex: female        Subjective:     Estimated Date of Delivery: 18  OB History      Para Term  AB Living    1 0 0 0 0 0    SAB TAB Ectopic Molar Multiple Live Births    0 0 0  0           Ms. Katie Flor is a 23 yo G1 at 9w0d by D=9 wk HIGINIO ultrasound now being admitted for severe N/V of pregnancy. 2 recent ER visits for hyperemesis. Pt has lost 15lbs since onset of pregnancy. Reports 15-20 episodes of emesis daily. She cannot keep down any PO nutrition or fluids for many days. She has large ketones on urine dip in office today.      Given severe hyperemesis, will admit patient to antepartum for IV fluids, anti-emetics (IV phenergan/zofran scheduled overnight), basic labs (CBC, BMP, TSH), stool softeners, antacids, etc. Daily weights, FHTs daily. TV ULTRASOUND PERFORMED  A NORMAL SIZE UTERUS WITH AN GESTATIONAL [address] SEEN IN THE FUNDAL PORTION OF THE ENDOMETRIUM. TOO EARLY TO DETERMINE GESTATIONAL AGE, CLINICAL COORELATION RECOMMENDED. A NORMAL YOLK SAC IS SEEN. RIGHT OVARY APPEAR WITHIN NORMAL LIMITS. LEFT OVARY APPEARS WITHING NORMAL LIMITS. NO FREE FLUID IS SEEN IN THE CDS.       Past Medical History:   Diagnosis Date    Anxiety     Chlamydia 10/2016    Depression     Headache     N&V (nausea and vomiting)     Routine Papanicolaou smear 2016    Nl with +yeast (media tab)     Snoring     SOB (shortness of breath)      Past Surgical History:   Procedure Laterality Date    HX TONSILLECTOMY      HX WISDOM TEETH EXTRACTION       Social History     Occupational History    Not on file.      Social History Main Topics    Smoking status: Former Smoker    Smokeless tobacco: Never Used      Comment: Never used vapor or e-cigs     Alcohol use No      Comment: Twice a week     Drug use: No    Sexual activity: Yes     Partners: Male     Birth control/ protection: None Family History   Problem Relation Age of Onset    No Known Problems Mother     Hypertension Father     No Known Problems Sister     No Known Problems Brother     Ovarian Cancer Maternal Aunt     Cancer Maternal Uncle      Lung     No Known Problems Paternal Aunt     No Known Problems Paternal Uncle     Cancer Maternal Grandmother      Lung     No Known Problems Maternal Grandfather     Diabetes Paternal Grandmother     Cancer Paternal Grandfather      Liver (?)    Cancer Other     Mental Retardation Other      Standard Mak' syndrome       No Known Allergies  Prior to Admission medications    Medication Sig Start Date End Date Taking? Authorizing Provider   promethazine (PHENERGAN) 12.5 mg tablet Take 1 Tab by mouth every six (6) hours as needed for Nausea. 9/25/17  Yes Robles Rivera MD   PYRIDOXINE HCL, VITAMIN B6, (VITAMIN B-6 PO) Take  by mouth. Chelsea Frazier MD   diphenhydrAMINE (BENADRYL ALLERGY) 12.5 mg/5 mL syrup Take 20 mL by mouth four (4) times daily as needed for up to 10 days. Indications: NAUSEA AND VOMITING 9/26/17 10/6/17  Homer Joseph MD   [id number] aj-folic-dha (PRENATAL DHA+COMPLETE PRENATAL) -459 mg-mcg-mg cmpk Take  by mouth. Historical Provider   ferrous sulfate (IRON) 325 mg (65 mg iron) tablet Take  by mouth Daily (before breakfast). Historical Provider   ondansetron (ZOFRAN ODT) 4 mg disintegrating tablet Take 1 Tab by mouth every eight (8) hours as needed for Nausea. 9/25/17   Robles Rivera MD        Review of Systems: A comprehensive review of systems was negative except for that written in the HPI.     Objective:     Visit Vitals    Ht 5' 4\" (1.626 m)    Wt 154 lb (69.9 kg)    Breastfeeding No    BMI 26.43 kg/m2     Physical Exam:  Gen: actively vomiting at times, moderate distress  Heart: Regular rate and rhythm  Lung: normal respiratory effort  Abdomen: soft, nontender  Lower Extremities:  - Edema No  Membranes:  Intact  Fetal Heart Rate: +FHTs on ultrasound in office today    Prenatal Labs:   No results found for: RUBELLAEXT, GRBSEXT, HBSAGEXT, HIVEXT, RPREXT, GONNOEXT, CHLAMEXT     Assessment/Plan:     25 yo admitted with hyperemesis gravidarum and inability to tolerate PO.  Failed outpatient management.     -admit to antepartum service  -IV hydration with D5 NS (1L IVF bolus to be followed by maintenance fluids at 125cc/hr)  -IV anti-emetics (IV phenergan/zofran scheduled overnight) with goal of transitioning to PO anti-emetics tomorrow  -basic labs (CBC, BMP, TSH)  -stool softeners (docusate/senna)  -IV famotidine prn  -daily weights  -FHTs daily  -repeat urine dip tomorrow to see if ketones clearing    Signed By:  Mac Parnell MD     October 3, 2017

## 2017-10-04 LAB
APPEARANCE UR: ABNORMAL
BACTERIA URNS QL MICRO: ABNORMAL /HPF
BILIRUB UR QL: NEGATIVE
COLOR UR: ABNORMAL
EPITH CASTS URNS QL MICRO: ABNORMAL /LPF
GLUCOSE UR STRIP.AUTO-MCNC: NEGATIVE MG/DL
HGB UR QL STRIP: NEGATIVE
KETONES UR QL STRIP.AUTO: NEGATIVE MG/DL
LEUKOCYTE ESTERASE UR QL STRIP.AUTO: ABNORMAL
MUCOUS THREADS URNS QL MICRO: ABNORMAL /LPF
NITRITE UR QL STRIP.AUTO: NEGATIVE
PH UR STRIP: 6 [PH] (ref 5–8)
PROT UR STRIP-MCNC: NEGATIVE MG/DL
RBC #/AREA URNS HPF: ABNORMAL /HPF (ref 0–5)
SP GR UR REFRACTOMETRY: 1.02 (ref 1–1.03)
UA: UC IF INDICATED,UAUC: ABNORMAL
UROBILINOGEN UR QL STRIP.AUTO: 1 EU/DL (ref 0.2–1)
WBC URNS QL MICRO: ABNORMAL /HPF (ref 0–4)

## 2017-10-04 PROCEDURE — 87086 URINE CULTURE/COLONY COUNT: CPT | Performed by: OBSTETRICS & GYNECOLOGY

## 2017-10-04 PROCEDURE — 74011000250 HC RX REV CODE- 250: Performed by: OBSTETRICS & GYNECOLOGY

## 2017-10-04 PROCEDURE — 81001 URINALYSIS AUTO W/SCOPE: CPT | Performed by: OBSTETRICS & GYNECOLOGY

## 2017-10-04 PROCEDURE — 74011250637 HC RX REV CODE- 250/637: Performed by: OBSTETRICS & GYNECOLOGY

## 2017-10-04 PROCEDURE — 74011000258 HC RX REV CODE- 258: Performed by: OBSTETRICS & GYNECOLOGY

## 2017-10-04 PROCEDURE — 74011250636 HC RX REV CODE- 250/636: Performed by: OBSTETRICS & GYNECOLOGY

## 2017-10-04 PROCEDURE — 96376 TX/PRO/DX INJ SAME DRUG ADON: CPT

## 2017-10-04 PROCEDURE — 96361 HYDRATE IV INFUSION ADD-ON: CPT

## 2017-10-04 PROCEDURE — 99218 HC RM OBSERVATION: CPT

## 2017-10-04 RX ORDER — SODIUM CHLORIDE 0.9 % (FLUSH) 0.9 %
SYRINGE (ML) INJECTION
Status: DISPENSED
Start: 2017-10-04 | End: 2017-10-05

## 2017-10-04 RX ORDER — PROMETHAZINE HYDROCHLORIDE 25 MG/1
12.5 TABLET ORAL
Status: DISCONTINUED | OUTPATIENT
Start: 2017-10-04 | End: 2017-10-05 | Stop reason: HOSPADM

## 2017-10-04 RX ORDER — ONDANSETRON 4 MG/1
4 TABLET, ORALLY DISINTEGRATING ORAL
Status: DISCONTINUED | OUTPATIENT
Start: 2017-10-04 | End: 2017-10-05 | Stop reason: HOSPADM

## 2017-10-04 RX ORDER — FAMOTIDINE 20 MG/1
20 TABLET, FILM COATED ORAL 2 TIMES DAILY
Status: DISCONTINUED | OUTPATIENT
Start: 2017-10-04 | End: 2017-10-05 | Stop reason: HOSPADM

## 2017-10-04 RX ADMIN — DEXTROSE MONOHYDRATE AND SODIUM CHLORIDE 125 ML/HR: 5; .9 INJECTION, SOLUTION INTRAVENOUS at 02:23

## 2017-10-04 RX ADMIN — ONDANSETRON 4 MG: 2 INJECTION INTRAMUSCULAR; INTRAVENOUS at 09:17

## 2017-10-04 RX ADMIN — DEXTROSE MONOHYDRATE AND SODIUM CHLORIDE 125 ML/HR: 5; .9 INJECTION, SOLUTION INTRAVENOUS at 10:17

## 2017-10-04 RX ADMIN — ONDANSETRON 4 MG: 4 TABLET, ORALLY DISINTEGRATING ORAL at 17:15

## 2017-10-04 RX ADMIN — FAMOTIDINE 20 MG: 10 INJECTION, SOLUTION INTRAVENOUS at 09:10

## 2017-10-04 RX ADMIN — FAMOTIDINE 20 MG: 20 TABLET ORAL at 17:14

## 2017-10-04 RX ADMIN — PROMETHAZINE HYDROCHLORIDE 12.5 MG: 25 TABLET ORAL at 21:23

## 2017-10-04 RX ADMIN — PYRIDOXINE HCL TAB 50 MG 100 MG: 50 TAB at 14:32

## 2017-10-04 NOTE — PROGRESS NOTES
Verbal shift change report given to Zia Arroyo RN (oncoming nurse) by Divina Griffin RN (offgoing nurse). Report included the following information SBAR, Kardex and MAR. Patient sleeping. 1000 Patient ate applesauce for breakfast.  Was unable to eat english muffin that was on her tray. Given saltine crackers to eat if able. 1130 Patient did not eat any crackers. Offered popsicle, patient agreed to try it. 1230 Patient finished the popsicle, ordered lunch for patient. 1415 Patient was able to eat the dinner roll and small amount of mashed potatoes for lunch. Also had sprite and water. Offered another popsicle, patient accepted. Patient states that she feels marginally better, but still has underlying nausea and experiences dizziness when she gets up to bathroom. [address] with Dr. Serge Bashir and reported the above information. Dr. Serge Bashir agreed to let the patient stay longer, will reassess after dinner. Dr. Serge Bashir asks the nurse to call and update her after the patient eats dinner. 1900 Patient has eaten 75% of her grilled chicken sandwich and 50% of her peaches. She states she is still working on eating dinner. She states she is concerned about going home as she is basically alone. She also states that she has been to the ED on a couple of occasions and ends up coming right back. Spoke with Dr. Serge Bashir, discharge order cancelled.   Will try to discharge the patient in the am.

## 2017-10-04 NOTE — DISCHARGE SUMMARY
Obstetrical Discharge Summary     Name: Rennie Bernheim MRN: [id number]  SSN: [id number]    YOB: 1995  Age: 24 y.o. Sex: female      Admit Date: 10/3/2017    Discharge Date: 10/5/17    Admitting Physician: Kaye Martinez MD     Attending Physician:  Kaye Martinez MD     Admission Diagnoses: pregnancy; Hyperemesis affecting pregnancy, antepartum    Discharge Diagnoses: hyperemesis gravidarum    Additional Diagnoses:   Hospital Problems  Date Reviewed: 10/3/2017          Codes Class Noted POA    Hyperemesis affecting pregnancy, antepartum ICD-10-CM: O21.0  ICD-9-CM: 643.03  10/3/2017 Unknown           No results found for: NEGRA CHILDERS [facility] THIEF RVR FALL Course: Pt was admitted for IV hydration, IV antiemetics, and basic labwork due to hyperemesis gravidarum and inability to tolerate PO. N/V gradually improved, she was transitioned to PO regimen and was tolerating oral anti-emetics prior to discharge. Pt to be discharged HOME if remains in STABLE condition later this afternoon. Patient Instructions:   Current Discharge Medication List      CONTINUE these medications which have NOT CHANGED    Details   promethazine (PHENERGAN) 12.5 mg tablet Take 1 Tab by mouth every six (6) hours as needed for Nausea. Qty: 30 Tab, Refills: 2      PYRIDOXINE HCL, VITAMIN B6, (VITAMIN B-6 PO) Take  by mouth. diphenhydrAMINE (BENADRYL ALLERGY) 12.5 mg/5 mL syrup Take 20 mL by mouth four (4) times daily as needed for up to 10 days. Indications: NAUSEA AND VOMITING  Qty: 120 mL, Refills: 0      [id number] aj-folic-dha (PRENATAL DHA+COMPLETE PRENATAL) -300 mg-mcg-mg cmpk Take  by mouth. ferrous sulfate (IRON) 325 mg (65 mg iron) tablet Take  by mouth Daily (before breakfast). ondansetron (ZOFRAN ODT) 4 mg disintegrating tablet Take 1 Tab by mouth every eight (8) hours as needed for Nausea. Qty: 30 Tab, Refills: 2             Reference my discharge instructions.     Follow-up Appointments Procedures    FOLLOW UP VISIT Appointment in: Two Weeks With Dr. Chas Mac as scheduled, or sooner prn     With Dr. Chas Mac as scheduled, or sooner prn     Standing Status:   Standing     Number of Occurrences:   1     Order Specific Question:   Appointment in     Answer:    [facility] Chas Mac MD  10/5/2017

## 2017-10-04 NOTE — PROGRESS NOTES
0330  Bedside and Verbal shift change report given to GISELLE Russo RN (oncoming nurse) by Kam Conn (offgoing nurse). Report included the following information SBAR, Kardex, MAR and Recent Results. Pt is sleeping.   0430  Pt sleeping  0530  Pt sleeping  0630  sleeping

## 2017-10-04 NOTE — PROGRESS NOTES
S:  Pt overall feeling better this morning. No acute overnight events. Nausea improved after IV fluids, phenergan, etc. Labs stable. Tolerated some liquids (ginger ale, water). Denies abdominal pain, fevers/chills, vag bleeding, or other concerns. O:  Visit Vitals    /50 (BP 1 Location: Right arm, BP Patient Position: At rest)    Pulse 72    Temp 99.2 °F (37.3 °C)    Resp 18    Ht 5' 4\" (1.626 m)    Wt 154 lb (69.9 kg)    LMP 08/01/2017 (Approximate)    Breastfeeding No    BMI 26.43 kg/m2       Gen - A&O, NAD, resting comfortably  HEENT - normocephalic  Resp - non-labored  CV - heart rate wnl  Abd - soft, nt, nd, gravid, no rebound or guarding   - deferred  Ext - no edema bilaterally    Recent Results (from the past 24 hour(s))   CBC W/O DIFF    Collection Time: 10/03/17  5:16 PM   Result Value Ref Range    WBC 13.0 (H) 3.6 - 11.0 K/uL    RBC 4.81 3.80 - 5.20 M/uL    HGB 14.3 11.5 - 16.0 g/dL    HCT 39.9 35.0 - 47.0 %    MCV 83.0 80.0 - 99.0 FL    MCH 29.7 26.0 - 34.0 PG    MCHC 35.8 30.0 - 36.5 g/dL    RDW 12.5 11.5 - 14.5 %    PLATELET 818 756 - 339 K/uL   METABOLIC PANEL, COMPREHENSIVE    Collection Time: 10/03/17  5:16 PM   Result Value Ref Range    Sodium 136 136 - 145 mmol/L    Potassium 3.5 3.5 - 5.1 mmol/L    Chloride 103 97 - 108 mmol/L    CO2 21 21 - 32 mmol/L    Anion gap 12 5 - 15 mmol/L    Glucose 96 65 - 100 mg/dL    BUN 9 6 - 20 MG/DL    Creatinine 0.54 (L) 0.55 - 1.02 MG/DL    BUN/Creatinine ratio 17 12 - 20      GFR est AA >60 >60 ml/min/1.73m2    GFR est non-AA >60 >60 ml/min/1.73m2    Calcium 9.6 8.5 - 10.1 MG/DL    Bilirubin, total 0.7 0.2 - 1.0 MG/DL    ALT (SGPT) 23 12 - 78 U/L    AST (SGOT) 13 (L) 15 - 37 U/L    Alk.  phosphatase 62 45 - 117 U/L    Protein, total 7.4 6.4 - 8.2 g/dL    Albumin 4.2 3.5 - 5.0 g/dL    Globulin 3.2 2.0 - 4.0 g/dL    A-G Ratio 1.3 1.1 - 2.2     TSH 3RD GENERATION    Collection Time: 10/03/17  5:16 PM   Result Value Ref Range    TSH 0.46 0.36 - 3. 74 uIU/mL       A/P:  24 y.o. G1 at 9w1d admitted with hyperemesis gravidarum.      Hyperemesis:  -D5NS at 125cc/hr  -repeat urinalysis to see if ketones are clearing  -advance diet gradually as tolerated  -IV anti-emetics (IV phenergan/zofran scheduled overnight) --> goal of transitioning to PO anti-emetics later today if tolerating PO  -stool softeners (docusate/senna)  -IV famotidine prn  -daily weights    IUP: RFS  -daily FHTs    Dispo: pending clinical improvement    Marleny Tolliver MD  10/4/2017  7:41 AM

## 2017-10-04 NOTE — PROGRESS NOTES
Bedside shift change report given to SHEYLA Mirza RN (oncoming nurse) by [employer]. Kaye Aguilar RN (offgoing nurse). Report included the following information SBAR, Kardex, Intake/Output, MAR and Recent Results.     [id number]:  [id number]:

## 2017-10-04 NOTE — PROGRESS NOTES
Received consult from RN to discuss barriers filling prescriptions. Met with pt at the bedside this PM to introduce role of case management, offer support, and discuss insurance concerns. Pt reports she is 9 weeks pregnant with her first child. Pt works full time for [employer] at [employer]. Pt stated that her mother also works for [employer] and that she is still on her [employer]. Pt reports that her and her mother are currently not on \"speaking terms\". Pt told her mother she was pregnant about a week ago which has led to family discord. Pt believes that her mother is upset about her pregnancy- her mother has told her that she needs to work towards getting her own health insurance plan. Pt recently tried to fill her prescription at [address] and was told that her insurance had  (card was old from ). Pt stated that she has not been able to obtain the new pharmacy insurance information from her mother. CM encouraged pt to have a conversation with her mother and explain the importance of filling her prescriptions to avoid further readmissions. Pt verbalized understanding. Pt stated that she is interested in applying for Medicaid through her pregnancy benefit if she is eligible. CM will make a referral to [facility] for financial screening. [facility] to contact pt to provide further information. Pt may also seek financial assistance (Medicaid) through local  department. If pt is unable to obtain pharmacy information from her mother, she will not be able to afford prescriptions at discharge. Pt states she has some Zofran left at home which she could use at discharge. CM also explained that hospital offers a one time financial assistance program- please page ED CM   before 7:30PM to arrange this if needed- however typically not utilized for insured patients. Emotional support provided.  CM will continue to follow and be available if additional needs arise.    [address]

## 2017-10-05 ENCOUNTER — TELEPHONE (OUTPATIENT)
Dept: OBGYN CLINIC | Age: 22
End: 2017-10-05

## 2017-10-05 VITALS
BODY MASS INDEX: 26.29 KG/M2 | HEART RATE: 65 BPM | SYSTOLIC BLOOD PRESSURE: 94 MMHG | RESPIRATION RATE: 16 BRPM | TEMPERATURE: 98.6 F | DIASTOLIC BLOOD PRESSURE: 52 MMHG | WEIGHT: 154 LBS | HEIGHT: 64 IN

## 2017-10-05 LAB
BACTERIA SPEC CULT: NORMAL
CC UR VC: NORMAL
SERVICE CMNT-IMP: NORMAL

## 2017-10-05 PROCEDURE — 99218 HC RM OBSERVATION: CPT

## 2017-10-05 PROCEDURE — 74011250637 HC RX REV CODE- 250/637: Performed by: OBSTETRICS & GYNECOLOGY

## 2017-10-05 RX ADMIN — ONDANSETRON 4 MG: 4 TABLET, ORALLY DISINTEGRATING ORAL at 08:35

## 2017-10-05 RX ADMIN — FAMOTIDINE 20 MG: 20 TABLET ORAL at 08:36

## 2017-10-05 NOTE — TELEPHONE ENCOUNTER
Patient calling stating that she was in the ER yesterday and was advised that a phenergan rx would be sent and patient states it is not at the pharmacy. Patient also needs a note for work excusing her from work yesterday and when is she able to come back to work. Please fax work note to [phone] ATTN: Maryjane    Please advise.

## 2017-10-05 NOTE — DISCHARGE INSTRUCTIONS
Extreme Nausea and Vomiting in Pregnancy: Care Instructions  Your Care Instructions  Nausea and vomiting (often called morning sickness) are common in pregnancy. They are caused by pregnancy hormones and happen most often in the first 3 months. Some women get very sick and are not able to keep down food and fluids. This extreme morning sickness is called hyperemesis gravidarum. It can lead to a dangerous loss of fluids in the body. It also can keep you from gaining weight and getting proper nutrition during your pregnancy. Your body fluids are put back in balance with water and minerals called electrolytes. Medicine may help if you have severe nausea and vomiting. Follow-up care is a key part of your treatment and safety. Be sure to make and go to all appointments, and call your doctor if you are having problems. It's also a good idea to know your test results and keep a list of the medicines you take. How can you care for yourself at home? · Take your medicines exactly as prescribed. Call your doctor if you think you are having a problem with your medicine. · Drink plenty of fluids to prevent dehydration. Choose water and other caffeine-free clear liquids until you feel better. Try sipping on sports drinks that have salt and sugar in them. · Eat a small snack, such as crackers, before you get out of bed. Wait a few minutes, then get out of bed slowly. · Keep food in your stomach, but not too much at once. An empty stomach can make nausea worse. Eat several small meals every day instead of three large meals. · Eat more protein and less fat. · Get plenty of vitamin B6 by eating whole grains, nuts, seeds, and legumes. You can take vitamin B6 tablets if your doctor says it is okay. · Try to avoid smells and foods that make you feel sick to your stomach. · Get lots of rest.  · You may want to try acupressure bands.  They put pressure on an acupressure point in the wrist. Some women feel better using the bands.  · Cynthia may also help you feel better. You can use it in tea, take it as a pill, or use a cynthia syrup that you can buy at a health food store. When should you call for help? Call 911 anytime you think you may need emergency care. For example, call if:  · You passed out (lost consciousness). Call your doctor now or seek immediate medical care if:  · You vomit more than 3 times in a day, especially if you also have a fever or pain. · You are too sick to your stomach to drink any fluids. · You have signs of needing more fluids. You have sunken eyes and a dry mouth, and you pass only a little dark urine. · Your morning sickness gets worse or does not get better with home care. · You are not able to keep down your medicine. Watch closely for changes in your health, and be sure to contact your doctor if you have any problems. Where can you learn more? Go to http://rika-chayo.info/. Enter X479 in the search box to learn more about \"Extreme Nausea and Vomiting in Pregnancy: Care Instructions. \"  Current as of: March 16, 2017  Content Version: 11.3  © 9800-8580 [employer]wise, Incorporated. Care instructions adapted under license by [employer] (which disclaims liability or warranty for this information). If you have questions about a medical condition or this instruction, always ask your healthcare professional. [place] [address] any warranty or liability for your use of this information.

## 2017-10-05 NOTE — PROGRESS NOTES
S:  Pt overall feeling better this morning. No acute overnight events.  Nausea improved, tolerating PO antiemetics and small meals.     O:  Visit Vitals    BP 90/53 (BP 1 Location: Left arm, BP Patient Position: At rest)    Pulse 74    Temp 97.8 °F (36.6 °C)    Resp 16    Ht 5' 4\" (1.626 m)    Wt 154 lb (69.9 kg)    Breastfeeding No    BMI 26.43 kg/m2     Gen - A&O, NAD, resting comfortably  HEENT - normocephalic  Resp - non-labored  CV - heart rate wnl  Abd - soft, nt, nd, gravid, no rebound or guarding   - deferred  Ext - no edema bilaterally      Recent Results (from the past 24 hour(s))   URINALYSIS W/ REFLEX CULTURE    Collection Time: 10/04/17 11:30 AM   Result Value Ref Range    Color DARK YELLOW      Appearance CLOUDY (A) CLEAR      Specific gravity 1.019 1.003 - 1.030      pH (UA) 6.0 5.0 - 8.0      Protein NEGATIVE  NEG mg/dL    Glucose NEGATIVE  NEG mg/dL    Ketone NEGATIVE  NEG mg/dL    Bilirubin NEGATIVE  NEG      Blood NEGATIVE  NEG      Urobilinogen 1.0 0.2 - 1.0 EU/dL    Nitrites NEGATIVE  NEG      Leukocyte Esterase TRACE (A) NEG      WBC 5-10 0 - 4 /hpf    RBC 0-5 0 - 5 /hpf    Epithelial cells FEW FEW /lpf    Bacteria 1+ (A) NEG /hpf    UA:UC IF INDICATED URINE CULTURE ORDERED (A) CNI      Mucus 1+ (A) NEG /lpf       A/P:  24 y.o. G1 at 9w2d admitted with hyperemesis gravidarum.      Hyperemesis:  -d/c IVF  -general diet as tolerated  -PO anti-emetics  -stool softeners (docusate/senna)  -famotidine prn  -daily weights     IUP: RFS  -daily FHTs     Dispo: anticipate discharge home later today    Snow Hernandez MD  10/5/2017  7:38 AM

## 2017-10-05 NOTE — TELEPHONE ENCOUNTER
Called patient's pharmacy and it was confirmed that Phenergan prescription was received (since September) but was not picked up by patient. Patient called and informed and will  medication. Patient also informed that Dr. Scarlett Cavanaugh recommends Phenergan regimen use (which was explained to patient by Dr. Scarlett Cavanaugh) and to stay out of work until next visit, so as to avoid flare up of continuous vomiting. Patient informed that Phenergan can make her drowsy and to avoid driving or operating machinery. Letter for work will be given to patient at her next visit (excusing her from work and return to work date). Patient verbalized understanding. Dr. Scarlett Cavanaugh informed.

## 2017-10-05 NOTE — PROGRESS NOTES
Verbal shift change report given to Chan Ireland RN (oncoming nurse) by Esau Gitelman, RN (offgoing nurse). Report included the following information SBAR, Kardex and [facility]     0845 patient discharged with friend to home.

## 2017-10-05 NOTE — PROGRESS NOTES
2000: Bedside shift change report given to Juju Rushing (oncoming nurse) by Arjun Plaza (offgoing nurse). Report included the following information SBAR, Kardex, Intake/Output, MAR and Recent Results. 2115:  VSS. No c/o pain.

## 2017-10-10 ENCOUNTER — HOSPITAL ENCOUNTER (EMERGENCY)
Age: 22
Discharge: HOME | End: 2017-10-10
Attending: EMERGENCY MEDICINE | Admitting: EMERGENCY MEDICINE
Payer: COMMERCIAL

## 2017-10-10 ENCOUNTER — TELEPHONE (OUTPATIENT)
Dept: OBGYN CLINIC | Age: 22
End: 2017-10-10

## 2017-10-10 VITALS
HEART RATE: 75 BPM | OXYGEN SATURATION: 100 % | BODY MASS INDEX: 26.47 KG/M2 | WEIGHT: 155.06 LBS | RESPIRATION RATE: 16 BRPM | DIASTOLIC BLOOD PRESSURE: 75 MMHG | TEMPERATURE: 98.5 F | SYSTOLIC BLOOD PRESSURE: 122 MMHG | HEIGHT: 64 IN

## 2017-10-10 DIAGNOSIS — R19.7 DIARRHEA, UNSPECIFIED TYPE: Primary | ICD-10-CM

## 2017-10-10 LAB
ALBUMIN SERPL-MCNC: 4 G/DL (ref 3.5–5)
ALBUMIN/GLOB SERPL: 1 {RATIO} (ref 1.1–2.2)
ALP SERPL-CCNC: 59 U/L (ref 45–117)
ALT SERPL-CCNC: 32 U/L (ref 12–78)
ANION GAP SERPL CALC-SCNC: 12 MMOL/L (ref 5–15)
APPEARANCE UR: ABNORMAL
AST SERPL-CCNC: 15 U/L (ref 15–37)
BACTERIA URNS QL MICRO: ABNORMAL /HPF
BASOPHILS # BLD: 0 K/UL (ref 0–0.1)
BASOPHILS NFR BLD: 0 % (ref 0–1)
BILIRUB SERPL-MCNC: 0.6 MG/DL (ref 0.2–1)
BILIRUB UR QL: NEGATIVE
BUN SERPL-MCNC: 8 MG/DL (ref 6–20)
BUN/CREAT SERPL: 12 (ref 12–20)
CALCIUM SERPL-MCNC: 9.6 MG/DL (ref 8.5–10.1)
CHLORIDE SERPL-SCNC: 101 MMOL/L (ref 97–108)
CO2 SERPL-SCNC: 23 MMOL/L (ref 21–32)
COLOR UR: ABNORMAL
CREAT SERPL-MCNC: 0.67 MG/DL (ref 0.55–1.02)
EOSINOPHIL # BLD: 0.1 K/UL (ref 0–0.4)
EOSINOPHIL NFR BLD: 1 % (ref 0–7)
EPITH CASTS URNS QL MICRO: ABNORMAL /LPF
ERYTHROCYTE [DISTWIDTH] IN BLOOD BY AUTOMATED COUNT: 12.6 % (ref 11.5–14.5)
GLOBULIN SER CALC-MCNC: 4 G/DL (ref 2–4)
GLUCOSE SERPL-MCNC: 91 MG/DL (ref 65–100)
GLUCOSE UR STRIP.AUTO-MCNC: NEGATIVE MG/DL
HCG SERPL-ACNC: ABNORMAL MIU/ML (ref 0–6)
HCT VFR BLD AUTO: 38.3 % (ref 35–47)
HCT, EXTERNAL: 38.3
HEMOCCULT STL QL: POSITIVE
HGB BLD-MCNC: 13.4 G/DL (ref 11.5–16)
HGB UR QL STRIP: NEGATIVE
HGB, EXTERNAL: 13.4
KETONES UR QL STRIP.AUTO: >80 MG/DL
LEUKOCYTE ESTERASE UR QL STRIP.AUTO: NEGATIVE
LIPASE SERPL-CCNC: 152 U/L (ref 73–393)
LYMPHOCYTES # BLD: 1 K/UL (ref 0.8–3.5)
LYMPHOCYTES NFR BLD: 8 % (ref 12–49)
MCH RBC QN AUTO: 29.1 PG (ref 26–34)
MCHC RBC AUTO-ENTMCNC: 35 G/DL (ref 30–36.5)
MCV RBC AUTO: 83.1 FL (ref 80–99)
MONOCYTES # BLD: 1.1 K/UL (ref 0–1)
MONOCYTES NFR BLD: 9 % (ref 5–13)
MUCOUS THREADS URNS QL MICRO: ABNORMAL /LPF
NEUTS SEG # BLD: 9.6 K/UL (ref 1.8–8)
NEUTS SEG NFR BLD: 82 % (ref 32–75)
NITRITE UR QL STRIP.AUTO: NEGATIVE
PH UR STRIP: 5.5 [PH] (ref 5–8)
PLATELET # BLD AUTO: 257 K/UL (ref 150–400)
PLATELET CNT,   EXTERNAL: 257
POTASSIUM SERPL-SCNC: 3.5 MMOL/L (ref 3.5–5.1)
PROT SERPL-MCNC: 8 G/DL (ref 6.4–8.2)
PROT UR STRIP-MCNC: 100 MG/DL
RBC # BLD AUTO: 4.61 M/UL (ref 3.8–5.2)
RBC #/AREA URNS HPF: ABNORMAL /HPF (ref 0–5)
SODIUM SERPL-SCNC: 136 MMOL/L (ref 136–145)
SP GR UR REFRACTOMETRY: >1.03 (ref 1–1.03)
UR CULT HOLD, URHOLD: NORMAL
URINALYSIS, EXTERNAL: NORMAL
UROBILINOGEN UR QL STRIP.AUTO: 0.2 EU/DL (ref 0.2–1)
WBC # BLD AUTO: 11.8 K/UL (ref 3.6–11)
WBC URNS QL MICRO: ABNORMAL /HPF (ref 0–4)

## 2017-10-10 PROCEDURE — 87086 URINE CULTURE/COLONY COUNT: CPT | Performed by: PHYSICIAN ASSISTANT

## 2017-10-10 PROCEDURE — 99283 EMERGENCY DEPT VISIT LOW MDM: CPT

## 2017-10-10 PROCEDURE — 83690 ASSAY OF LIPASE: CPT | Performed by: PHYSICIAN ASSISTANT

## 2017-10-10 PROCEDURE — 82272 OCCULT BLD FECES 1-3 TESTS: CPT | Performed by: PHYSICIAN ASSISTANT

## 2017-10-10 PROCEDURE — 81001 URINALYSIS AUTO W/SCOPE: CPT | Performed by: PHYSICIAN ASSISTANT

## 2017-10-10 PROCEDURE — 74011250636 HC RX REV CODE- 250/636: Performed by: PHYSICIAN ASSISTANT

## 2017-10-10 PROCEDURE — 36415 COLL VENOUS BLD VENIPUNCTURE: CPT | Performed by: PHYSICIAN ASSISTANT

## 2017-10-10 PROCEDURE — 96374 THER/PROPH/DIAG INJ IV PUSH: CPT

## 2017-10-10 PROCEDURE — 84702 CHORIONIC GONADOTROPIN TEST: CPT | Performed by: PHYSICIAN ASSISTANT

## 2017-10-10 PROCEDURE — 80053 COMPREHEN METABOLIC PANEL: CPT | Performed by: STUDENT IN AN ORGANIZED HEALTH CARE EDUCATION/TRAINING PROGRAM

## 2017-10-10 PROCEDURE — 85025 COMPLETE CBC W/AUTO DIFF WBC: CPT | Performed by: STUDENT IN AN ORGANIZED HEALTH CARE EDUCATION/TRAINING PROGRAM

## 2017-10-10 PROCEDURE — 96361 HYDRATE IV INFUSION ADD-ON: CPT

## 2017-10-10 RX ORDER — ONDANSETRON 2 MG/ML
4 INJECTION INTRAMUSCULAR; INTRAVENOUS
Status: COMPLETED | OUTPATIENT
Start: 2017-10-10 | End: 2017-10-10

## 2017-10-10 RX ADMIN — ONDANSETRON 4 MG: 2 INJECTION INTRAMUSCULAR; INTRAVENOUS at 17:37

## 2017-10-10 RX ADMIN — SODIUM CHLORIDE 1000 ML: 900 INJECTION, SOLUTION INTRAVENOUS at 17:37

## 2017-10-10 NOTE — DISCHARGE INSTRUCTIONS
-Drink plenty of clear fluids, and try to eat a bland diet until symptoms resolve (ex: bananas, rice, applesauce, toast)         Diarrhea: Care Instructions  Your Care Instructions    Diarrhea is loose, watery stools (bowel movements). The exact cause is often hard to find. Sometimes diarrhea is your body's way of getting rid of what caused an upset stomach. Viruses, food poisoning, and many medicines can cause diarrhea. Some people get diarrhea in response to emotional stress, anxiety, or certain foods. Almost everyone has diarrhea now and then. It usually isn't serious, and your stools will return to normal soon. The important thing to do is replace the fluids you have lost, so you can prevent dehydration. The doctor has checked you carefully, but problems can develop later. If you notice any problems or new symptoms, get medical treatment right away. Follow-up care is a key part of your treatment and safety. Be sure to make and go to all appointments, and call your doctor if you are having problems. It's also a good idea to know your test results and keep a list of the medicines you take. How can you care for yourself at home? · Watch for signs of dehydration, which means your body has lost too much water. Dehydration is a serious condition and should be treated right away. Signs of dehydration are:  ¨ Increasing thirst and dry eyes and mouth. ¨ Feeling faint or lightheaded. ¨ Darker urine, and a smaller amount of urine than normal.  · To prevent dehydration, drink plenty of fluids, enough so that your urine is light yellow or clear like water. Choose water and other caffeine-free clear liquids until you feel better. If you have kidney, heart, or liver disease and have to limit fluids, talk with your doctor before you increase the amount of fluids you drink. · Begin eating small amounts of mild foods the next day, if you feel like it. ¨ Try yogurt that has live cultures of Lactobacillus.  (Check the label.)  ¨ Avoid spicy foods, fruits, alcohol, and caffeine until 48 hours after all symptoms are gone. ¨ Avoid chewing gum that contains sorbitol. ¨ Avoid dairy products (except for yogurt with Lactobacillus) while you have diarrhea and for 3 days after symptoms are gone. · The doctor may recommend that you take over-the-counter medicine, such as loperamide (Imodium), if you still have diarrhea after 6 hours. Read and follow all instructions on the label. Do not use this medicine if you have bloody diarrhea, a high fever, or other signs of serious illness. Call your doctor if you think you are having a problem with your medicine. When should you call for help? Call 911 anytime you think you may need emergency care. For example, call if:  · You passed out (lost consciousness). · Your stools are maroon or very bloody. Call your doctor now or seek immediate medical care if:  · You are dizzy or lightheaded, or you feel like you may faint. · Your stools are black and look like tar, or they have streaks of blood. · You have new or worse belly pain. · You have symptoms of dehydration, such as:  ¨ Dry eyes and a dry mouth. ¨ Passing only a little dark urine. ¨ Feeling thirstier than usual.  · You have a new or higher fever. Watch closely for changes in your health, and be sure to contact your doctor if:  · Your diarrhea is getting worse. · You see pus in the diarrhea. · You are not getting better after 2 days (48 hours). Where can you learn more? Go to http://rika-chayo.info/. Enter Q957 in the search box to learn more about \"Diarrhea: Care Instructions. \"  Current as of: March 20, 2017  Content Version: 11.3  © 7241-3180 [employer]. Care instructions adapted under license by [employer] (which disclaims liability or warranty for this information).  If you have questions about a medical condition or this instruction, always ask your healthcare professional. [employer] disclaims any warranty or liability for your use of this information.

## 2017-10-10 NOTE — ED TRIAGE NOTES
Lower abd cramps, +n/v, denies blood in vomit, pt is 10 weeks pregnant, pt started having bright red bloody diarrhea yesterday, denies fever, pt has had 4 stools today

## 2017-10-10 NOTE — TELEPHONE ENCOUNTER
Call received at 357pm    24year old  10wod pregnant patient last seen in the office on 10/3/17. Patient calling to say that yesterday, 10/9/17 and during the night she had severe diarrhea ( 20 times) , cramping at 8-9 and throwing up. Patient reports things have calmed down today but the diarrhea has been all blood. She has only had diarrhea times one today and it was blood. Patient reports that the cramping feels like she took 20 laxatives. Patient denies vaginal bleeding. Patient advised to go to the ER as per MD order. Patient verbalized understanding.

## 2017-10-10 NOTE — ED PROVIDER NOTES
HPI Comments: 25 y/o pregnant female (approx 10 weeks gestation, ultrasound-confirmed IUP), with PMHx of anxiety and depression, presenting with complaint of abdominal pain, N/V/D. She reports an onset of diarrhea yesterday, with associated crampy lower abdominal pain that is temporarily relieved after having bowel movements. Today she noticed some blood in her stool, which prompted her to present to the ED for evaluation. She has no known sick contacts. Her pain is 4/10, intermittent, crampy, non-radiating. She endorses some nausea and vomiting, but denies fevers, chills, vaginal pain/bleeding, chest pain, shortness of breath, light-headedness or syncope. The history is provided by the patient. Past Medical History:   Diagnosis Date    Anxiety     Chlamydia 10/2016    Depression     Headache     N&V (nausea and vomiting)     Routine Papanicolaou smear 02/09/2016    Nl with +yeast (media tab)     Snoring     SOB (shortness of breath)        Past Surgical History:   Procedure Laterality Date    HX TONSILLECTOMY      HX WISDOM TEETH EXTRACTION           Family History:   Problem Relation Age of Onset    No Known Problems Mother     Hypertension Father     No Known Problems Sister     No Known Problems Brother     Ovarian Cancer Maternal Aunt     Cancer Maternal Uncle      Lung     No Known Problems Paternal Aunt     No Known Problems Paternal Uncle     Cancer Maternal Grandmother      Lung     No Known Problems Maternal Grandfather     Diabetes Paternal Grandmother     Cancer Paternal Grandfather      Liver (?)    Cancer Other     Mental Retardation Other      Downs' syndrome       Social History     Social History    Marital status: SINGLE     Spouse name: N/A    Number of children: N/A    Years of education: N/A     Occupational History    Not on file.      Social History Main Topics    Smoking status: Former Smoker    Smokeless tobacco: Never Used      Comment: Never used vapor or e-cigs     Alcohol use No      Comment: Twice a week     Drug use: No    Sexual activity: Yes     Partners: Male     Birth control/ protection: None     Other Topics Concern    Not on file     Social History Narrative     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         ALLERGIES: Review of patient's allergies indicates no known allergies. Review of Systems   Constitutional: Negative for chills and fever. Respiratory: Negative for shortness of breath. Cardiovascular: Negative for chest pain. Gastrointestinal: Positive for abdominal pain, blood in stool, diarrhea, nausea and vomiting. Genitourinary: Negative for vaginal bleeding and vaginal pain. Musculoskeletal: Negative for myalgias. Skin: Negative for rash. Neurological: Negative for syncope and light-headedness. All other systems reviewed and are negative. Vitals:    10/10/17 1538   BP: 128/83   Pulse: (!) 115   Resp: 16   Temp: 98.2 °F (36.8 °C)   SpO2: 97%   Weight: 70.3 kg (155 lb 1 oz)   Height: 5' 4\" (1.626 m)            Physical Exam   Constitutional: She is oriented to person, place, and time. She appears well-developed and well-nourished. No distress. HENT:   Head: Normocephalic and atraumatic. Eyes: Conjunctivae and EOM are normal.   Neck: Normal range of motion. Neck supple. Cardiovascular: Regular rhythm and normal heart sounds. Tachycardia present. Pulmonary/Chest: Effort normal and breath sounds normal.   Abdominal: Soft. She exhibits no distension. There is tenderness in the right upper quadrant, left upper quadrant and left lower quadrant. There is no rigidity, no rebound and no guarding. Genitourinary: Rectal exam shows external hemorrhoid. Rectal exam shows no internal hemorrhoid, no fissure and anal tone normal.   Musculoskeletal: Normal range of motion. Neurological: She is alert and oriented to person, place, and time. Skin: Skin is warm and dry. She is not diaphoretic.    Nursing note and vitals reviewed. MDM  Number of Diagnoses or Management Options  Diarrhea, unspecified type:      Amount and/or Complexity of Data Reviewed  Clinical lab tests: ordered and reviewed  Discuss the patient with other providers: yes (Dr. Eric Wilde, ED attending)    Patient Progress  Patient progress: stable    ED Course       Procedures      23 y/o pregnant female (approx 10 weeks gestation), with PMHx of anxiety and depression, presenting with complaint of abdominal pain, N/V/D. History and exam as described above, most suggestive of gastroenteritis. Abdominal exam benign, no vaginal bleeding - low clinical suspicion of miscarriage. Confirmed IUP, doubt ectopic. Patient given 1L fluid bolus, Zofran. Will send CBC, CMP, lipase, HCG, UA. HCG appropriate for gestational age. Stool is hemoccult positive, UA indeterminate - will send urine culture. Labs are otherwise unremarkable. Patient reassessed, states she is feeling better. Bloody stool likely due to external hemorrhoids/irritation secondary to diarrhea. Safe for outpatient GI follow up. Recommended hydration with clear fluids, bland diet. Strict ED return precautions discussed and provided in writing at time of discharge. The patient verbalized understanding and agreement with this plan.

## 2017-10-11 RX ORDER — PROMETHAZINE HYDROCHLORIDE 12.5 MG/1
12.5 TABLET ORAL
Qty: 30 TAB | Refills: 1 | Status: SHIPPED | OUTPATIENT
Start: 2017-10-11 | End: 2017-10-26 | Stop reason: SDUPTHER

## 2017-10-11 NOTE — TELEPHONE ENCOUNTER
Call received at 2:15PM      24year old  10w1d pregnant patient seen in the ER yesterday. Patient calling to get her prescription sent to a different pharmacy. Prescription resent as per MD order to patient preferred pharmacy. Patient verbalized understanding.

## 2017-10-12 ENCOUNTER — OFFICE VISIT (OUTPATIENT)
Dept: OBGYN CLINIC | Age: 22
End: 2017-10-12

## 2017-10-12 VITALS
SYSTOLIC BLOOD PRESSURE: 106 MMHG | WEIGHT: 154 LBS | BODY MASS INDEX: 26.29 KG/M2 | DIASTOLIC BLOOD PRESSURE: 72 MMHG | HEIGHT: 64 IN

## 2017-10-12 DIAGNOSIS — Z01.419 ENCOUNTER FOR GYNECOLOGICAL EXAMINATION WITHOUT ABNORMAL FINDING: ICD-10-CM

## 2017-10-12 DIAGNOSIS — Z34.01 ENCOUNTER FOR SUPERVISION OF NORMAL FIRST PREGNANCY IN FIRST TRIMESTER: Primary | ICD-10-CM

## 2017-10-12 LAB
ANTIBODY SCREEN, EXTERNAL: NEGATIVE
BACTERIA SPEC CULT: NORMAL
CC UR VC: NORMAL
CHLAMYDIA, EXTERNAL: NEGATIVE
HBSAG, EXTERNAL: NEGATIVE
HGB EVAL, EXTERNAL: NORMAL
HIV, EXTERNAL: NEGATIVE
N. GONORRHEA, EXTERNAL: NEGATIVE
PAP SMEAR, EXTERNAL: NORMAL
RUBELLA, EXTERNAL: NORMAL
SERVICE CMNT-IMP: NORMAL
T. PALLIDUM, EXTERNAL: NEGATIVE
TYPE, ABO & RH, EXTERNAL: NORMAL
VARICELLA, EXTERNAL: NORMAL

## 2017-10-12 NOTE — PROGRESS NOTES
Initial OB Visit    Donna Jeffrey is a 24 y.o.  at 10w2d by ultrasound presenting for initial OB visit, overall doing well. Pregnancy symptoms:  -N/V? Improving, today only when she woke up initially  -breast tenderness? denies  -fatigue? Has improved with less nausea/vomiting  -cramping? Middle to left of pelvis  -weight change? yes  -Vaginal bleeding? Denies  -Fetal Movement? N/A    Ob/Gyn Hx:   A0 -  EDC- 17  LMP- 16  Menstrual pattern prior to conception: regular  Contraception at time of conception? none  Date of 1st positive UPT: 16  STI- chlamydia  ? SA- not currently    Health maintenance:  Pap- 2 years ago, normal per patient  Gardasil- unsure  Mammo- unsure    Substance history: negative for alcohol, tobacco and street drugs. Exposure history: There are no cats in the home. The patient was instructed to not change the cat litter. She admits/denies close contact with children on a regular basis. She has had chicken pox or the vaccine in the past.   Patient denies issues with domestic violence. Genetic Screening/Teratology Counseling: (Includes patient, baby's father, or anyone in either family with:)  3.  Patient's age >/= 28 at [facility]?-- no  .   2. Thalassemia ([facility], [facility], [address] [facility], or  background): MCV<80?--no.     3.  Neural tube defect (meningomyelocele, spina bifida, anencephaly)?--no.   4.  Congenital heart defect?--no.  5.  Down syndrome?--no.   6.  Jan-Sachs ([employer], [facility] [place])?--no.   7.  Canavan's Disease?--no.   8.  Familial Dysautonomia?--no.   9.  Sickle cell disease or trait ()? --no   The patient has not been tested for sickle trait  10. Hemophilia or other blood disorders?--no. 11.  Muscular dystrophy?--no. 12.  Cystic fibrosis?--no. 13.  Trigg's Chorea?--no. 14.  Mental retardation/autism (if yes was person tested for Fragile X)?--no. 15.  Other inherited genetic or chromosomal disorder?--no.    16. Maternal metabolic disorder (DM, PKU, etc)?--no. 17.  Patient or FOB with a child with a birth defect not listed above?--no.  17a. Patient or FOB with a birth defect themselves?--no. 18.  Recurrent pregnancy loss, or stillbirth?--no. 19.  Any medications since LMP other than prenatal vitamins (include vitamins, supplements, OTC meds, drugs, alcohol)?--no. 20.  Any other genetic/environmental exposure to discuss?--no. Infection History:  1. Lives with someone with TB or TB exposed?--no.   2.  Patient or partner has history of genital herpes?--no.  3.  Rash or viral illness since LMP?--no.    4.  History of STD (GC, CT, HPV, syphilis, HIV)? --no   5. Other: OTHER? Past Medical History:   Diagnosis Date    Anxiety     Chlamydia 10/2016    Depression     Headache     N&V (nausea and vomiting)     Routine Papanicolaou smear 02/09/2016    Nl with +yeast (media tab)     Snoring     SOB (shortness of breath)      Past Surgical History:   Procedure Laterality Date    HX TONSILLECTOMY      HX WISDOM TEETH EXTRACTION         Family History   Problem Relation Age of Onset    No Known Problems Mother     Hypertension Father     No Known Problems Sister     No Known Problems Brother     Ovarian Cancer Maternal Aunt     Cancer Maternal Uncle      Lung     No Known Problems Paternal Aunt     No Known Problems Paternal Uncle     Cancer Maternal Grandmother      Lung     No Known Problems Maternal Grandfather     Diabetes Paternal Grandmother     Cancer Paternal Grandfather      Liver (?)    Cancer Other     Mental Retardation Other      Downs' syndrome     Social History     Social History    Marital status: SINGLE     Spouse name: N/A    Number of children: N/A    Years of education: N/A     Occupational History    Not on file.      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 30 Tab 1    [id number] aj-folic-dha (PRENATAL DHA+COMPLETE PRENATAL) -300 mg-mcg-mg cmpk Take  by mouth.  ondansetron (ZOFRAN ODT) 4 mg disintegrating tablet Take 1 Tab by mouth every eight (8) hours as needed for Nausea.  30 Tab 2     No Known Allergies      Review of Systems - History obtained from the patient  Constitutional: negative for weight loss, fever, night sweats  HEENT: negative for hearing loss, earache, congestion, snoring, sorethroat  CV: negative for chest pain, palpitations, edema  Resp: negative for cough, shortness of breath, wheezing  GI: negative for change in bowel habits, abdominal pain, black or bloody stools  : negative for frequency, dysuria, hematuria, vaginal discharge  MSK: negative for back pain, joint pain, muscle pain  Breast: negative for breast lumps, nipple discharge, galactorrhea  Skin :negative for itching, rash, hives  Neuro: negative for dizziness, headache, confusion, weakness  Psych: negative for anxiety, depression, change in mood  Heme/lymph: negative for bleeding, bruising, pallor    Physical Exam    Visit Vitals    LMP 08/01/2017 (Approximate)       Constitutional  · Appearance: well-nourished, well developed, alert, in no acute distress    HENT  · Head and Face: appears normal    Neck  · Inspection/Palpation: normal appearance, no masses or tenderness  · Lymph Nodes: no lymphadenopathy present  · Thyroid: gland size normal, nontender, no nodules or masses present on palpation    Chest  · Respiratory Effort: non-labored breathing  · Auscultation: CTAB, normal breath sounds    Cardiovascular  · Heart:  · Auscultation: regular rate and rhythm without murmur  · Extremities: no peripheral edema    Breasts  · Inspection of Breasts: breasts symmetrical, no skin changes, no discharge present, nipple appearance normal, no skin retraction present  · Palpation of Breasts and Axillae: no masses present on palpation, no breast tenderness  · Axillary Lymph Nodes: no lymphadenopathy present    Gastrointestinal  · Abdominal Examination: abdomen non-tender to palpation, normal bowel sounds, no masses present  · Liver and spleen: no hepatomegaly present, spleen not palpable  · Hernias: no hernias identified    Genitourinary  · External Genitalia: normal appearance for age, no discharge present, no tenderness present, no inflammatory lesions present, no masses present, no atrophy present  · Vagina: normal vaginal vault without central or paravaginal defects, no discharge present, no inflammatory lesions present, no masses present  · Bladder: non-tender to palpation  · Urethra: appears normal  · Cervix: normal, no cervicitis, no CMT  · Uterus: approximately {Numbers 1-15,20,30:38226} week sized  · Adnexa: no adnexal tenderness present, no adnexal masses present  · Perineum: perineum within normal limits, no evidence of trauma, no rashes or skin lesions present    Skin  · General Inspection: no rash, no lesions identified    Neurologic/Psychiatric  · Mental Status:  · Orientation: grossly oriented to person, place and time  · Mood and Affect: mood normal, affect appropriate      Assessment/Plan:  24 y.o.  at 10w2d by *** (dating method) presenting for initial OB visit. Overall doing well.      IUP: +UPT, size c/w dates  -refer for dating ultrasound  -daily PNV   -flu vaccine if indicated  -tdap at 28 wks  -counseled on nutrition and proper weight gain in pregnancy as well as foods to avoid  -discussed other high yield topics including appropriate exercise levels, sexual activity, toxoplasmosis precautions, toxin avoidance, travel advice (including zika travel warnings)  -screen for DV    Genetic screening/diagnostic testing:  -counselled on screening options including CF testing, 1st trimester screen/NT --> MSAFP, 2nd trimester tetra screen, NIPT, CVS, amniocentesis, etc.  -{does/does not:200015} desire testing    PNL:   -new OB labs today  -pap +/- HPV today  -GC/Chl  -early glucola if indicated     PMH:    PP plans:  -feeding?  -contraception?  -circ (if male)? RTC: 1 month, or sooner prn for problems or concerns  -cramping, pain, bleeding precautions reviewed  -handouts and instructions provided    Michelle Fung  10/12/2017  3:16 PM                 TV ULTRASOUND PERFORMED  A SINGLE VIABLE 10W2D IUP IS SEEN WITH NORMAL CARDIAC RHYTHM. THERE IS A SMALL HYPOECHOIC AREA ADJACENT TO THE GESTATIONAL SAC MEASURING 13 X 5 MM,  POSSIBLE SUBCHORIONIC HEMORRHAGE. GESTATIONAL AGE BASED ON ULTRASOUND. A NORMAL YOLK SAC IS SEEN. RIGHT OVARY APPEAR WITHIN NORMAL LIMITS. LEFT OVARY APPEARS WITHING NORMAL LIMITS. NO FREE FLUID IS SEEN IN THE CDS.

## 2017-10-12 NOTE — PATIENT INSTRUCTIONS
Pap Test: Care Instructions  Your Care Instructions  The Pap test (also called a Pap smear) is a screening test for cancer of the cervix, which is the lower part of the uterus that opens into the vagina. The test can help your doctor find early changes in the cells that could lead to cancer. The sample of cells taken during your test has been sent to a lab so that an expert can look at the cells. It usually takes a week or two to get the results back. Follow-up care is a key part of your treatment and safety. Be sure to make and go to all appointments, and call your doctor if you are having problems. It's also a good idea to know your test results and keep a list of the medicines you take. What do the results mean? · A normal result means that the test did not find any abnormal cells in the sample. · An abnormal result can mean many things. Most of these are not cancer. The results of your test may be abnormal because:  ¨ You have an infection of the vagina or cervix, such as a yeast infection. ¨ You have an IUD (intrauterine device for birth control). ¨ You have low estrogen levels after menopause that are causing the cells to change. ¨ You have cell changes that may be a sign of precancer or cancer. The results are ranked based on how serious the changes might be. There are many other reasons why you might not get a normal result. If the results were abnormal, you may need to get another test within a few weeks or months. If the results show changes that could be a sign of cancer, you may need a test called a colposcopy, which provides a more complete view of the cervix. Sometimes the lab cannot use the sample because it does not contain enough cells or was not preserved well. If so, you may need to have the test again. This is not common, but it does happen from time to time. When should you call for help?   Watch closely for changes in your health, and be sure to contact your doctor if:  · You have vaginal bleeding or pain for more than 2 days after the test. It is normal to have a small amount of bleeding for a day or two after the test.  Where can you learn more? Go to http://rika-chayo.info/. Enter V196 in the search box to learn more about \"Pap Test: Care Instructions. \"  Current as of: July 26, 2016  Content Version: 11.3  © 7183-2155 [employer]. Care instructions adapted under license by [employer] (which disclaims liability or warranty for this information). If you have questions about a medical condition or this instruction, always ask your healthcare professional. [address] any warranty or liability for your use of this information.

## 2017-10-12 NOTE — PROGRESS NOTES
Pt feeling much better at this time. N/V improved on zofran/diclegis. She feels ready to return to work. No other new problems or concerns. No vaginal bleeding or cramping at this time. Pt did have recent admission to ER for episode of diarrhea and associated rectal bleeding. She was diagnosed with gastroenteritis and external hemorrhoids. Pt reports those symptoms have all resolved at this time.     TV ULTRASOUND PERFORMED today 10/12/17  A SINGLE VIABLE 10W2D IUP IS SEEN WITH NORMAL CARDIAC RHYTHM. THERE IS A SMALL HYPOECHOIC AREA ADJACENT TO THE GESTATIONAL SAC MEASURING 13 X 5 MM,  POSSIBLE SUBCHORIONIC HEMORRHAGE. GESTATIONAL AGE BASED ON ULTRASOUND. A NORMAL YOLK SAC IS SEEN. RIGHT OVARY APPEAR WITHIN NORMAL LIMITS. LEFT OVARY APPEARS WITHING NORMAL LIMITS. NO FREE FLUID IS SEEN IN THE CDS. Assessment/Plan:  25 yo G0 at 10w2d by D=8 wk HIGINIO ultrasound presenting for LORI visit. +prior admission for hyperemesis --> N/V now improving.     IUP: +FHTs, aden viable IUP confirmed on ultrasound, some suggestion of subchorionic hemorrhage on scan today  -cont PNV   -flu vaccine next visit  -tdap at 28 wks  -pain/bleeding precautions     Genetic screening/diagnostic testing: declined     PNL:   -new OB labs including VZV titer today     PMH/Pregnancy problems:  -N/V of pregnancy/hyperemesis requiring admission x1: significantly improved at this time, weight stable. Continue small freq meals, phenergan/zofran/diclegis prn --> may return to work  -recurrent mild HAs: good hydration, tylenol prn  -h/o anxiety/depression: mood currently stable, no meds, discussed increased risk of PP depression     PP plans:  -feeding? tbd  -contraception? tbd  -circ (if male)?  tbd  -desires epidural  -FOB \"Julián\" --> though pt confided that there is some question about paternity     RTC: 4 weeks, or sooner prayala Cardenas MD  10/12/2017  3:47 PM

## 2017-10-12 NOTE — LETTER
NOTIFICATION RETURN TO WORK / SCHOOL 
 
10/16/2017 10:30 AM 
 
Ms. Collette Fothergill [address], [place] [id number] [facility] [id number] To Whom It May Concern: 
 
Collette Fothergill is currently under the care of [facility]. She will return to work/school on: 10/13/17. If there are questions or concerns please have the patient contact our office. Sincerely, Taisha Martin MD

## 2017-10-13 LAB — VZV IGG SER IA-ACNC: 798 INDEX

## 2017-10-14 LAB
ABO GROUP BLD: NORMAL
BLD GP AB SCN SERPL QL: NEGATIVE
HBV SURFACE AG SERPL QL IA: NEGATIVE
HGB A MFR BLD: 97.6 % (ref 94–98)
HGB A2 MFR BLD COLUMN CHROM: 2.4 % (ref 0.7–3.1)
HGB C MFR BLD: 0 %
HGB F MFR BLD: 0 % (ref 0–2)
HGB FRACT BLD-IMP: NORMAL
HGB S BLD QL SOLY: NEGATIVE
HGB S MFR BLD: 0 %
HIV 1+2 AB+HIV1 P24 AG SERPL QL IA: NON REACTIVE
RH BLD: POSITIVE
RUBV IGG SERPL IA-ACNC: 2.46 INDEX
T PALLIDUM AB SER QL IA: NEGATIVE

## 2017-10-16 RX ORDER — ONDANSETRON 4 MG/1
4 TABLET, ORALLY DISINTEGRATING ORAL
Qty: 30 TAB | Refills: 2 | Status: SHIPPED | OUTPATIENT
Start: 2017-10-16 | End: 2018-05-16

## 2017-10-17 LAB
C TRACH RRNA SPEC QL NAA+PROBE: NEGATIVE
CYTOLOGIST CVX/VAG CYTO: NORMAL
CYTOLOGY CVX/VAG DOC THIN PREP: NORMAL
DX ICD CODE: NORMAL
LABCORP, 190119: NORMAL
Lab: NORMAL
N GONORRHOEA RRNA SPEC QL NAA+PROBE: NEGATIVE
OTHER STN SPEC: NORMAL
PATH REPORT.FINAL DX SPEC: NORMAL
STAT OF ADQ CVX/VAG CYTO-IMP: NORMAL
T VAGINALIS RRNA SPEC QL NAA+PROBE: NEGATIVE

## 2017-10-26 ENCOUNTER — TELEPHONE (OUTPATIENT)
Dept: OBGYN CLINIC | Age: 22
End: 2017-10-26

## 2017-10-26 RX ORDER — PROMETHAZINE HYDROCHLORIDE 12.5 MG/1
12.5 TABLET ORAL
Qty: 30 TAB | Refills: 1 | Status: SHIPPED | OUTPATIENT
Start: 2017-10-26 | End: 2018-05-16

## 2017-10-26 NOTE — TELEPHONE ENCOUNTER
Call received at 1:48PM      24year old  12w2d pregnant patient last seen in the office on 10/12/17. Patient calling to say that she went to the pharmacy to  her prescription for Phenergan and it was not at the pharmacy. This nurse was going to call the pharmacy and patient requested the medication be sent to different pharmacy. Prescription resent as per MD order to patient requested pharmacy.

## 2017-11-01 ENCOUNTER — HOSPITAL ENCOUNTER (EMERGENCY)
Age: 22
Discharge: HOME | End: 2017-11-02
Attending: EMERGENCY MEDICINE
Payer: COMMERCIAL

## 2017-11-01 DIAGNOSIS — O21.0 HYPEREMESIS GRAVIDARUM: Primary | ICD-10-CM

## 2017-11-01 PROCEDURE — 99284 EMERGENCY DEPT VISIT MOD MDM: CPT

## 2017-11-01 PROCEDURE — 85025 COMPLETE CBC W/AUTO DIFF WBC: CPT | Performed by: EMERGENCY MEDICINE

## 2017-11-01 PROCEDURE — 87086 URINE CULTURE/COLONY COUNT: CPT | Performed by: EMERGENCY MEDICINE

## 2017-11-01 PROCEDURE — 36415 COLL VENOUS BLD VENIPUNCTURE: CPT | Performed by: EMERGENCY MEDICINE

## 2017-11-01 PROCEDURE — 80053 COMPREHEN METABOLIC PANEL: CPT | Performed by: EMERGENCY MEDICINE

## 2017-11-01 PROCEDURE — 83690 ASSAY OF LIPASE: CPT | Performed by: EMERGENCY MEDICINE

## 2017-11-01 PROCEDURE — 74011250636 HC RX REV CODE- 250/636: Performed by: EMERGENCY MEDICINE

## 2017-11-01 PROCEDURE — 96374 THER/PROPH/DIAG INJ IV PUSH: CPT

## 2017-11-01 PROCEDURE — 96375 TX/PRO/DX INJ NEW DRUG ADDON: CPT

## 2017-11-01 PROCEDURE — 81001 URINALYSIS AUTO W/SCOPE: CPT | Performed by: EMERGENCY MEDICINE

## 2017-11-01 PROCEDURE — 96361 HYDRATE IV INFUSION ADD-ON: CPT

## 2017-11-01 RX ORDER — ONDANSETRON 2 MG/ML
8 INJECTION INTRAMUSCULAR; INTRAVENOUS
Status: COMPLETED | OUTPATIENT
Start: 2017-11-01 | End: 2017-11-01

## 2017-11-01 RX ORDER — DIPHENHYDRAMINE HYDROCHLORIDE 50 MG/ML
25 INJECTION, SOLUTION INTRAMUSCULAR; INTRAVENOUS
Status: COMPLETED | OUTPATIENT
Start: 2017-11-01 | End: 2017-11-01

## 2017-11-01 RX ADMIN — ONDANSETRON 8 MG: 2 INJECTION INTRAMUSCULAR; INTRAVENOUS at 23:34

## 2017-11-01 RX ADMIN — DIPHENHYDRAMINE HYDROCHLORIDE 25 MG: 50 INJECTION, SOLUTION INTRAMUSCULAR; INTRAVENOUS at 23:34

## 2017-11-01 RX ADMIN — SODIUM CHLORIDE 1000 ML: 9 INJECTION, SOLUTION INTRAVENOUS at 23:32

## 2017-11-02 ENCOUNTER — TELEPHONE (OUTPATIENT)
Dept: OBGYN CLINIC | Age: 22
End: 2017-11-02

## 2017-11-02 VITALS
OXYGEN SATURATION: 99 % | RESPIRATION RATE: 16 BRPM | DIASTOLIC BLOOD PRESSURE: 74 MMHG | WEIGHT: 170 LBS | BODY MASS INDEX: 29.02 KG/M2 | TEMPERATURE: 97.9 F | HEIGHT: 64 IN | HEART RATE: 99 BPM | SYSTOLIC BLOOD PRESSURE: 122 MMHG

## 2017-11-02 LAB
ALBUMIN SERPL-MCNC: 4 G/DL (ref 3.5–5)
ALBUMIN/GLOB SERPL: 1.1 {RATIO} (ref 1.1–2.2)
ALP SERPL-CCNC: 60 U/L (ref 45–117)
ALT SERPL-CCNC: 10 U/L (ref 12–78)
ANION GAP SERPL CALC-SCNC: 17 MMOL/L (ref 5–15)
APPEARANCE UR: ABNORMAL
AST SERPL-CCNC: 21 U/L (ref 15–37)
BACTERIA URNS QL MICRO: ABNORMAL /HPF
BASOPHILS # BLD: 0 K/UL (ref 0–0.1)
BASOPHILS NFR BLD: 0 % (ref 0–1)
BILIRUB SERPL-MCNC: 0.5 MG/DL (ref 0.2–1)
BILIRUB UR QL: NEGATIVE
BUN SERPL-MCNC: 12 MG/DL (ref 6–20)
BUN/CREAT SERPL: 17 (ref 12–20)
CALCIUM SERPL-MCNC: 9.9 MG/DL (ref 8.5–10.1)
CHLORIDE SERPL-SCNC: 103 MMOL/L (ref 97–108)
CO2 SERPL-SCNC: 20 MMOL/L (ref 21–32)
COLOR UR: ABNORMAL
CREAT SERPL-MCNC: 0.7 MG/DL (ref 0.55–1.02)
EOSINOPHIL # BLD: 0 K/UL (ref 0–0.4)
EOSINOPHIL NFR BLD: 0 % (ref 0–7)
EPITH CASTS URNS QL MICRO: ABNORMAL /LPF
ERYTHROCYTE [DISTWIDTH] IN BLOOD BY AUTOMATED COUNT: 13.6 % (ref 11.5–14.5)
GLOBULIN SER CALC-MCNC: 3.6 G/DL (ref 2–4)
GLUCOSE SERPL-MCNC: 123 MG/DL (ref 65–100)
GLUCOSE UR STRIP.AUTO-MCNC: NEGATIVE MG/DL
HCT VFR BLD AUTO: 35.6 % (ref 35–47)
HGB BLD-MCNC: 12.8 G/DL (ref 11.5–16)
HGB UR QL STRIP: NEGATIVE
KETONES UR QL STRIP.AUTO: 80 MG/DL
LEUKOCYTE ESTERASE UR QL STRIP.AUTO: NEGATIVE
LIPASE SERPL-CCNC: 108 U/L (ref 73–393)
LYMPHOCYTES # BLD: 0.8 K/UL (ref 0.8–3.5)
LYMPHOCYTES NFR BLD: 5 % (ref 12–49)
MCH RBC QN AUTO: 30.1 PG (ref 26–34)
MCHC RBC AUTO-ENTMCNC: 36 G/DL (ref 30–36.5)
MCV RBC AUTO: 83.8 FL (ref 80–99)
MONOCYTES # BLD: 0.3 K/UL (ref 0–1)
MONOCYTES NFR BLD: 2 % (ref 5–13)
MUCOUS THREADS URNS QL MICRO: ABNORMAL /LPF
NEUTS SEG # BLD: 14.1 K/UL (ref 1.8–8)
NEUTS SEG NFR BLD: 93 % (ref 32–75)
NITRITE UR QL STRIP.AUTO: NEGATIVE
PH UR STRIP: 5.5 [PH] (ref 5–8)
PLATELET # BLD AUTO: 302 K/UL (ref 150–400)
POTASSIUM SERPL-SCNC: 3.6 MMOL/L (ref 3.5–5.1)
PROT SERPL-MCNC: 7.6 G/DL (ref 6.4–8.2)
PROT UR STRIP-MCNC: 30 MG/DL
RBC # BLD AUTO: 4.25 M/UL (ref 3.8–5.2)
RBC #/AREA URNS HPF: ABNORMAL /HPF (ref 0–5)
RBC MORPH BLD: ABNORMAL
SODIUM SERPL-SCNC: 140 MMOL/L (ref 136–145)
SP GR UR REFRACTOMETRY: >1.03 (ref 1–1.03)
UA: UC IF INDICATED,UAUC: ABNORMAL
UROBILINOGEN UR QL STRIP.AUTO: 0.2 EU/DL (ref 0.2–1)
WBC # BLD AUTO: 15.2 K/UL (ref 3.6–11)
WBC URNS QL MICRO: ABNORMAL /HPF (ref 0–4)

## 2017-11-02 PROCEDURE — 74011250636 HC RX REV CODE- 250/636: Performed by: EMERGENCY MEDICINE

## 2017-11-02 PROCEDURE — 96361 HYDRATE IV INFUSION ADD-ON: CPT

## 2017-11-02 RX ADMIN — SODIUM CHLORIDE 1000 ML: 9 INJECTION, SOLUTION INTRAVENOUS at 01:27

## 2017-11-02 NOTE — ED TRIAGE NOTES
Patient reports she is 13 weeks pregnant and began throwing up yesterday morning. States she is dehydrated. Has not taken her home zofran or phenergan.

## 2017-11-02 NOTE — DISCHARGE INSTRUCTIONS
We hope that we have addressed all of your medical concerns. The examination and treatment you received in the Emergency Department were for an emergent problem and were not intended as complete care. It is important that you follow up with your healthcare provider(s) for ongoing care. If your symptoms worsen or do not improve as expected, and you are unable to reach your usual health care provider(s), you should return to the Emergency Department. Today's healthcare is undergoing tremendous change, and patient satisfaction surveys are one of the many tools to assess the quality of medical care. You may receive a survey from the [employer] regarding your experience in the Emergency Department. I hope that your experience has been completely positive, particularly the medical care that I provided. As such, please participate in the survey; anything less than excellent does not meet my expectations or intentions. [facility] and [address] [place] [address] participate in nationally recognized quality of care measures. If your blood pressure is greater than 120/80, as reported below, we urge that you seek medical care to address the potential of high blood pressure, commonly known as hypertension. Hypertension can be hereditary or can be caused by certain medical conditions, pain, stress, or \"white coat syndrome. \"       Please make an appointment with your health care provider(s) for follow up of your Emergency Department visit. VITALS:   Patient Vitals for the past 8 hrs:   Temp Pulse Resp BP SpO2   11/02/17 0115 - 86 16 115/56 100 %   11/01/17 2307 97.9 °F (36.6 °C) (!) 112 16 136/79 99 %          Thank you for allowing us to provide you with medical care today. We realize that you have many choices for your emergency care needs. Please choose us in the future for any continued health care needs. Petra Canseco, MD    [address] [facility] [place].   Office: [phone]            Recent Results (from the past 24 hour(s))   CBC WITH AUTOMATED DIFF    Collection Time: 11/01/17 11:32 PM   Result Value Ref Range    WBC 15.2 (H) 3.6 - 11.0 K/uL    RBC 4.25 3.80 - 5.20 M/uL    HGB 12.8 11.5 - 16.0 g/dL    HCT 35.6 35.0 - 47.0 %    MCV 83.8 80.0 - 99.0 FL    MCH 30.1 26.0 - 34.0 PG    MCHC 36.0 30.0 - 36.5 g/dL    RDW 13.6 11.5 - 14.5 %    PLATELET 878 591 - 726 K/uL    NEUTROPHILS 93 (H) 32 - 75 %    LYMPHOCYTES 5 (L) 12 - 49 %    MONOCYTES 2 (L) 5 - 13 %    EOSINOPHILS 0 0 - 7 %    BASOPHILS 0 0 - 1 %    ABS. NEUTROPHILS 14.1 (H) 1.8 - 8.0 K/UL    ABS. LYMPHOCYTES 0.8 0.8 - 3.5 K/UL    ABS. MONOCYTES 0.3 0.0 - 1.0 K/UL    ABS. EOSINOPHILS 0.0 0.0 - 0.4 K/UL    ABS. BASOPHILS 0.0 0.0 - 0.1 K/UL    RBC COMMENTS NORMOCYTIC, NORMOCHROMIC     METABOLIC PANEL, COMPREHENSIVE    Collection Time: 11/01/17 11:32 PM   Result Value Ref Range    Sodium 140 136 - 145 mmol/L    Potassium 3.6 3.5 - 5.1 mmol/L    Chloride 103 97 - 108 mmol/L    CO2 20 (L) 21 - 32 mmol/L    Anion gap 17 (H) 5 - 15 mmol/L    Glucose 123 (H) 65 - 100 mg/dL    BUN 12 6 - 20 MG/DL    Creatinine 0.70 0.55 - 1.02 MG/DL    BUN/Creatinine ratio 17 12 - 20      GFR est AA >60 >60 ml/min/1.73m2    GFR est non-AA >60 >60 ml/min/1.73m2    Calcium 9.9 8.5 - 10.1 MG/DL    Bilirubin, total 0.5 0.2 - 1.0 MG/DL    ALT (SGPT) 10 (L) 12 - 78 U/L    AST (SGOT) 21 15 - 37 U/L    Alk.  phosphatase 60 45 - 117 U/L    Protein, total 7.6 6.4 - 8.2 g/dL    Albumin 4.0 3.5 - 5.0 g/dL    Globulin 3.6 2.0 - 4.0 g/dL    A-G Ratio 1.1 1.1 - 2.2     LIPASE    Collection Time: 11/01/17 11:32 PM   Result Value Ref Range    Lipase 108 73 - 393 U/L   URINALYSIS W/ REFLEX CULTURE    Collection Time: 11/01/17 11:32 PM   Result Value Ref Range    Color YELLOW/STRAW      Appearance CLOUDY (A) CLEAR      Specific gravity >1.030 (H) 1.003 - 1.030    pH (UA) 5.5 5.0 - 8.0      Protein 30 (A) NEG mg/dL    Glucose NEGATIVE  NEG mg/dL    Ketone 80 (A) NEG mg/dL    Bilirubin NEGATIVE  NEG      Blood NEGATIVE  NEG      Urobilinogen 0.2 0.2 - 1.0 EU/dL    Nitrites NEGATIVE  NEG      Leukocyte Esterase NEGATIVE  NEG      WBC 5-10 0 - 4 /hpf    RBC 0-5 0 - 5 /hpf    Epithelial cells FEW FEW /lpf    Bacteria 1+ (A) NEG /hpf    UA:UC IF INDICATED URINE CULTURE ORDERED (A) CNI      Mucus 1+ (A) NEG /lpf       No results found. Extreme Nausea and Vomiting in Pregnancy: Care Instructions  Your Care Instructions    Nausea and vomiting (often called morning sickness) are common in pregnancy. They are caused by pregnancy hormones and happen most often in the first 3 months. Some women get very sick and are not able to keep down food and fluids. This extreme morning sickness is called hyperemesis gravidarum. It can lead to a dangerous loss of fluids in the body. It also can keep you from gaining weight and getting proper nutrition during your pregnancy. Your body fluids are put back in balance with water and minerals called electrolytes. Medicine may help if you have severe nausea and vomiting. Follow-up care is a key part of your treatment and safety. Be sure to make and go to all appointments, and call your doctor if you are having problems. It's also a good idea to know your test results and keep a list of the medicines you take. How can you care for yourself at home? · Take your medicines exactly as prescribed. Call your doctor if you think you are having a problem with your medicine. · Drink plenty of fluids to prevent dehydration. Choose water and other caffeine-free clear liquids until you feel better. Try sipping on sports drinks that have salt and sugar in them. · Eat a small snack, such as crackers, before you get out of bed. Wait a few minutes, then get out of bed slowly. · Keep food in your stomach, but not too much at once. An empty stomach can make nausea worse.  Eat several small meals every day instead of three large meals. · Eat more protein and less fat. · Get plenty of vitamin B6 by eating whole grains, nuts, seeds, and legumes. You can take vitamin B6 tablets if your doctor says it is okay. · Try to avoid smells and foods that make you feel sick to your stomach. · Get lots of rest.  · You may want to try acupressure bands. They put pressure on an acupressure point in the wrist. Some women feel better using the bands. · Cynthia may also help you feel better. You can use it in tea, take it as a pill, or use a cynthia syrup that you can buy at a [employer] food store. When should you call for help? Call 911 anytime you think you may need emergency care. For example, call if:  ? · You passed out (lost consciousness). ?Call your doctor now or seek immediate medical care if:  ? · You are sick to your stomach or cannot drink fluids. ? · You have symptoms of dehydration, such as:  ¨ Dry eyes and a dry mouth. ¨ Passing only a little urine. ¨ Feeling thirstier than usual.   ? · You are not able to keep down your medicines. ? · You have pain in your belly or pelvis. ? Watch closely for changes in your health, and be sure to contact your doctor if:  ? · You do not get better as expected. Where can you learn more? Go to http://rika-chayo.info/. Enter J144 in the search box to learn more about \"Extreme Nausea and Vomiting in Pregnancy: Care Instructions. \"  Current as of: March 16, 2017  Content Version: 11.4  © 0243-3841 [employer] Care instructions adapted under license by [employer] (which disclaims liability or warranty for this information). If you have questions about a medical condition or this instruction, always ask your healthcare professional. [address] any warranty or liability for your use of this information.        Oral Rehydration: Care Instructions  Your Care Instructions    Dehydration occurs when your body loses too much water. This can happen if you do not drink enough fluids or lose a lot of fluid due to diarrhea, vomiting, or sweating. Being dehydrated can cause health problems and can even be life-threatening. To replace lost fluids, you need to drink liquid that contains special chemicals called electrolytes. Electrolytes keep your body working well. Plain water does not have electrolytes. You also need to rest to prevent more fluid loss. Replacing water and electrolytes (oral rehydration) completely takes about 36 hours. But you should feel better within a few hours. Follow-up care is a key part of your treatment and safety. Be sure to make and go to all appointments, and call your doctor if you are having problems. It's also a good idea to know your test results and keep a list of the medicines you take. How can you care for yourself at home? · Take frequent sips of a drink such as Gatorade, Powerade, or other rehydration drinks that your doctor suggests. These replace both fluid and important chemicals (electrolytes) you need for balance in your blood. · Drink 2 quarts of cool liquid over 2 to 4 hours. You should have at least 10 glasses of liquid a day to replace lost fluid. If you have kidney, heart, or liver disease and have to limit fluids, talk with your doctor before you increase the amount of fluids you drink. · Make your own drink. Measure everything carefully. The drink may not work well or may even be harmful if the amounts are off. ¨ 1 quart water  ¨ ½ teaspoon salt  ¨ 6 teaspoons sugar  · Do not drink liquid with caffeine, such as coffee and krysten. · Do not drink any alcohol. It can make you dehydrated. · Drink plenty of fluids, enough so that your urine is light yellow or clear like water. If you have kidney, heart, or liver disease and have to limit fluids, talk with your doctor before you increase the amount of fluids you drink. When should you call for help?   Call 911 anytime you think you may need emergency care. For example, call if:  ? · You have signs of severe dehydration, such as:  ¨ You are confused or unable to stay awake. ¨ You passed out (lost consciousness). ?Call your doctor now or seek immediate medical care if:  ? · You still have signs of dehydration. You have sunken eyes and a dry mouth, and you pass only a little dark urine. ? · You are dizzy or lightheaded, or you feel like you may faint. ? · You are not able to keep down fluids. ? Watch closely for changes in your health, and be sure to contact your doctor if:  ? · You do not get better as expected. Where can you learn more? Go to http://rika-chayo.info/. Enter I040 in the search box to learn more about \"Oral Rehydration: Care Instructions. \"  Current as of: March 20, 2017  Content Version: 11.4  © 1582-4398 [employer]. Care instructions adapted under license by [employer] (which disclaims liability or warranty for this information). If you have questions about a medical condition or this instruction, always ask your healthcare professional. [address] any warranty or liability for your use of this information.

## 2017-11-02 NOTE — ED PROVIDER NOTES
HPI Comments: 24 y.o. female with past medical history significant for N/V, Anxiety who presents from home with chief complaint of nausea with vomiting. Pt is 13 weeks pregnant (A0). She reports daily nausea with vomiting throughout pregnancy thus far. Yesterday, symptoms became more frequent. She has Zofran and Phenergan at home for symptoms but did not take today due to having an empty stomach. Pt concerned for dehydration. Pt also c/o cough. Pt denies diarrhea, constipation, fever, urinary symptoms, abdominal pain, hematemesis, vaginal discharge, vaginal bleeding. There are no other acute medical concerns at this time. Chart review: Pt evaluated in ED (, 10/2 and 10/10) for symptoms significant for nausea and vomiting. UTI noted , pt started on Macrobid. PCP: Sue Laguerre NP  OB/GYN: Essie Batres MD (last appointment: early October, next appointment: next week). Note written by Aamir Weir, as dictated by Kelsy Lucas MD 11:30 PM    The history is provided by the patient. No  was used.         Past Medical History:   Diagnosis Date    Anxiety     Chlamydia 10/2016    Depression     Headache     N&V (nausea and vomiting)     Routine Papanicolaou smear 2016    Nl with +yeast (media tab)     Snoring     SOB (shortness of breath)        Past Surgical History:   Procedure Laterality Date    HX TONSILLECTOMY      HX WISDOM TEETH EXTRACTION           Family History:   Problem Relation Age of Onset    No Known Problems Mother     Hypertension Father     No Known Problems Sister     No Known Problems Brother     Ovarian Cancer Maternal Aunt     Cancer Maternal Uncle      Lung     No Known Problems Paternal Aunt     No Known Problems Paternal Uncle     Cancer Maternal Grandmother      Lung     No Known Problems Maternal Grandfather     Diabetes Paternal Grandmother     Cancer Paternal Grandfather      Liver (?)    Cancer Other  Mental Retardation Other      Downs' syndrome       Social History     Social History    Marital status: SINGLE     Spouse name: N/A    Number of children: N/A    Years of education: N/A     Occupational History    Not on file. Social History Main Topics    Smoking status: Former Smoker    Smokeless tobacco: Never Used      Comment: Never used vapor or e-cigs     Alcohol use No      Comment: Twice a week     Drug use: No    Sexual activity: Yes     Partners: Male     Birth control/ protection: None     Other Topics Concern    Not on file     Social History Narrative     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         ALLERGIES: Review of patient's allergies indicates no known allergies. Review of Systems   Constitutional: Negative for fever. HENT: Negative for congestion and sore throat. Eyes: Negative for photophobia. Respiratory: Positive for cough. Negative for shortness of breath. Cardiovascular: Negative for chest pain and leg swelling. Gastrointestinal: Positive for nausea and vomiting. Negative for abdominal pain, constipation and diarrhea. Genitourinary: Negative for difficulty urinating, dysuria, hematuria, vaginal bleeding and vaginal discharge. Musculoskeletal: Negative for back pain and neck pain. Skin: Negative for rash. Neurological: Negative for dizziness, weakness, numbness and headaches. All other systems reviewed and are negative. Vitals:    11/01/17 2307   BP: 136/79   Pulse: (!) 112   Resp: 16   Temp: 97.9 °F (36.6 °C)   SpO2: 99%   Weight: 77.1 kg (170 lb)   Height: 5' 4\" (1.626 m)            Physical Exam   Nursing note and vitals reviewed. CONSTITUTIONAL: Well-appearing; well-nourished; in mild distress  HEAD: Normocephalic; atraumatic  EYES: PERRL; EOM intact; conjunctiva and sclera are clear bilaterally. ENT: No rhinorrhea; normal pharynx with no tonsillar hypertrophy; mucous membranes pink/dry, no erythema, no exudate.   NECK: Supple; non-tender; no cervical lymphadenopathy  CARD: Normal S1, S2; no murmurs, rubs, or gallops. Regular rate and rhythm. RESP: Normal respiratory effort; breath sounds clear and equal bilaterally; no wheezes, rhonchi, or rales. ABD: Normal bowel sounds; non-distended; non-tender; no palpable organomegaly, no masses, no bruits. Back Exam: Normal inspection; no vertebral point tenderness, no CVA tenderness. Normal range of motion. EXT: Normal ROM in all four extremities; non-tender to palpation; no swelling or deformity; distal pulses are normal, no edema. SKIN: Warm; dry; no rash. NEURO:Alert and oriented x 3, coherent, CALLIE-XII grossly intact, sensory and motor are non-focal.        MDM  Number of Diagnoses or Management Options  Hyperemesis gravidarum:   Diagnosis management comments: Assessment: 39-year-old female, who presents with Intractable nausea, vomiting, and epigastric discomfort. Symptoms appear consistent with hyperemesis, gravidarum rule out lites abnormality, and dehydration. The patient appears otherwise hemodynamically stable and very anxious. Plan: Lab/ IV fluid/ antiemetic/ . P.o.  challenge/ serial exam/ monitor and reevaluate               Amount and/or Complexity of Data Reviewed  Clinical lab tests: ordered and reviewed  Tests in the radiology section of CPT®: ordered and reviewed  Tests in the medicine section of CPT®: reviewed and ordered  Discussion of test results with the performing providers: yes  Decide to obtain previous medical records or to obtain history from someone other than the patient: yes  Obtain history from someone other than the patient: yes  Review and summarize past medical records: yes  Discuss the patient with other providers: yes  Independent visualization of images, tracings, or specimens: yes    Risk of Complications, Morbidity, and/or Mortality  Presenting problems: moderate  Diagnostic procedures: moderate  Management options: moderate      ED Course Procedures  Progress Note:   Pt has been reexamined by Donato Holstein, MD. Pt is feeling much better. Symptoms have improved. All available results have been reviewed with pt and any available family. Pt understands sx, dx, and tx in ED. Care plan has been outlined and questions have been answered. Pt was given P.O. Challenge that she tolerated well. She denies any further discomfort. Pt is ready to go home. Will send home on Hyperemesis and dehydration instruction. Oral rehydration education. Outpatient referral with PCP/ OB as needed. Written by Donato Holstein, MD,2:09 AM    .   .

## 2017-11-02 NOTE — ED NOTES
Pt was educated on DC instructions by physician. Pt stated understanding. Pt and visitors were able to ambulate to lobby without incident with all belongings.

## 2017-11-02 NOTE — TELEPHONE ENCOUNTER
Call received at 11:24am      SP 24year old  13w2d pregnant patient last seen in the office on 10/12/17. Patient calling to update MD that she has been seen in the [facility] ER last night for dehydration. Patient denies vaginal bleeding and cramping currently and states she is drinking and taking her medication. Patient has next appointment to be seen in the office on 17.       ? Do you want to see patient sooner.     Please advise

## 2017-11-03 ENCOUNTER — PATIENT OUTREACH (OUTPATIENT)
Dept: OTHER | Age: 22
End: 2017-11-03

## 2017-11-03 LAB
BACTERIA SPEC CULT: NORMAL
CC UR VC: NORMAL
SERVICE CMNT-IMP: NORMAL

## 2017-11-03 NOTE — PROGRESS NOTES
Patient on [facility] discharge report dated 11/3. Left message on voicemail. Will attempt to contact again. Need to complete post-discharge assessment.

## 2017-11-06 ENCOUNTER — PATIENT OUTREACH (OUTPATIENT)
Dept: OTHER | Age: 22
End: 2017-11-06

## 2017-11-06 ENCOUNTER — HOSPITAL ENCOUNTER (EMERGENCY)
Age: 22
Discharge: HOME | End: 2017-11-07
Attending: EMERGENCY MEDICINE
Payer: COMMERCIAL

## 2017-11-06 VITALS
DIASTOLIC BLOOD PRESSURE: 75 MMHG | SYSTOLIC BLOOD PRESSURE: 134 MMHG | WEIGHT: 152 LBS | TEMPERATURE: 96.4 F | HEIGHT: 64 IN | OXYGEN SATURATION: 98 % | HEART RATE: 104 BPM | RESPIRATION RATE: 16 BRPM | BODY MASS INDEX: 25.95 KG/M2

## 2017-11-06 DIAGNOSIS — N39.0 URINARY TRACT INFECTION WITHOUT HEMATURIA, SITE UNSPECIFIED: ICD-10-CM

## 2017-11-06 DIAGNOSIS — O21.0 HYPEREMESIS ARISING DURING PREGNANCY: Primary | ICD-10-CM

## 2017-11-06 LAB
ALBUMIN SERPL-MCNC: 4.2 G/DL (ref 3.5–5)
ALBUMIN/GLOB SERPL: 1.1 {RATIO} (ref 1.1–2.2)
ALP SERPL-CCNC: 68 U/L (ref 45–117)
ALT SERPL-CCNC: 19 U/L (ref 12–78)
ANION GAP SERPL CALC-SCNC: 15 MMOL/L (ref 5–15)
AST SERPL-CCNC: 13 U/L (ref 15–37)
BASOPHILS # BLD: 0 K/UL (ref 0–0.1)
BASOPHILS NFR BLD: 0 % (ref 0–1)
BILIRUB SERPL-MCNC: 0.6 MG/DL (ref 0.2–1)
BUN SERPL-MCNC: 10 MG/DL (ref 6–20)
BUN/CREAT SERPL: 15 (ref 12–20)
CALCIUM SERPL-MCNC: 10.4 MG/DL (ref 8.5–10.1)
CHLORIDE SERPL-SCNC: 101 MMOL/L (ref 97–108)
CO2 SERPL-SCNC: 22 MMOL/L (ref 21–32)
CREAT SERPL-MCNC: 0.68 MG/DL (ref 0.55–1.02)
DIFFERENTIAL METHOD BLD: ABNORMAL
EOSINOPHIL # BLD: 0 K/UL (ref 0–0.4)
EOSINOPHIL NFR BLD: 0 % (ref 0–7)
ERYTHROCYTE [DISTWIDTH] IN BLOOD BY AUTOMATED COUNT: 13.8 % (ref 11.5–14.5)
GLOBULIN SER CALC-MCNC: 4 G/DL (ref 2–4)
GLUCOSE SERPL-MCNC: 112 MG/DL (ref 65–100)
HCT VFR BLD AUTO: 36.1 % (ref 35–47)
HGB BLD-MCNC: 12.9 G/DL (ref 11.5–16)
LIPASE SERPL-CCNC: 121 U/L (ref 73–393)
LYMPHOCYTES # BLD: 0.8 K/UL (ref 0.8–3.5)
LYMPHOCYTES NFR BLD: 4 % (ref 12–49)
MCH RBC QN AUTO: 30 PG (ref 26–34)
MCHC RBC AUTO-ENTMCNC: 35.7 G/DL (ref 30–36.5)
MCV RBC AUTO: 84 FL (ref 80–99)
MONOCYTES # BLD: 0.4 K/UL (ref 0–1)
MONOCYTES NFR BLD: 2 % (ref 5–13)
NEUTS SEG # BLD: 18.2 K/UL (ref 1.8–8)
NEUTS SEG NFR BLD: 94 % (ref 32–75)
PLATELET # BLD AUTO: 334 K/UL (ref 150–400)
POTASSIUM SERPL-SCNC: 3.6 MMOL/L (ref 3.5–5.1)
PROT SERPL-MCNC: 8.2 G/DL (ref 6.4–8.2)
RBC # BLD AUTO: 4.3 M/UL (ref 3.8–5.2)
RBC MORPH BLD: ABNORMAL
SODIUM SERPL-SCNC: 138 MMOL/L (ref 136–145)
WBC # BLD AUTO: 19.4 K/UL (ref 3.6–11)

## 2017-11-06 PROCEDURE — 96374 THER/PROPH/DIAG INJ IV PUSH: CPT

## 2017-11-06 PROCEDURE — 96361 HYDRATE IV INFUSION ADD-ON: CPT

## 2017-11-06 PROCEDURE — 80053 COMPREHEN METABOLIC PANEL: CPT | Performed by: EMERGENCY MEDICINE

## 2017-11-06 PROCEDURE — 85025 COMPLETE CBC W/AUTO DIFF WBC: CPT | Performed by: EMERGENCY MEDICINE

## 2017-11-06 PROCEDURE — 36415 COLL VENOUS BLD VENIPUNCTURE: CPT | Performed by: EMERGENCY MEDICINE

## 2017-11-06 PROCEDURE — 83690 ASSAY OF LIPASE: CPT | Performed by: EMERGENCY MEDICINE

## 2017-11-06 PROCEDURE — 99284 EMERGENCY DEPT VISIT MOD MDM: CPT

## 2017-11-06 PROCEDURE — 74011250636 HC RX REV CODE- 250/636: Performed by: EMERGENCY MEDICINE

## 2017-11-06 RX ORDER — CEPHALEXIN 500 MG/1
500 CAPSULE ORAL 3 TIMES DAILY
Qty: 15 CAP | Refills: 0 | Status: SHIPPED | OUTPATIENT
Start: 2017-11-06 | End: 2017-11-11

## 2017-11-06 RX ORDER — ONDANSETRON 2 MG/ML
4 INJECTION INTRAMUSCULAR; INTRAVENOUS
Status: COMPLETED | OUTPATIENT
Start: 2017-11-06 | End: 2017-11-06

## 2017-11-06 RX ORDER — CEPHALEXIN 500 MG/1
500 CAPSULE ORAL 3 TIMES DAILY
Qty: 15 CAP | Refills: 0 | Status: SHIPPED | OUTPATIENT
Start: 2017-11-06 | End: 2017-11-06

## 2017-11-06 RX ADMIN — SODIUM CHLORIDE 2000 ML: 900 INJECTION, SOLUTION INTRAVENOUS at 22:13

## 2017-11-06 RX ADMIN — ONDANSETRON 4 MG: 2 INJECTION INTRAMUSCULAR; INTRAVENOUS at 22:12

## 2017-11-06 NOTE — PROGRESS NOTES
Second attempt to reach patient for [facility], and discharge assessment. Discreet VM left. Will send UTR letter.

## 2017-11-06 NOTE — LETTER
11/6/2017 10:59 AM 
 
Ms. Dakota Astorga [address] [address] [address] [address] Dear Ms. Bryon Troy, My name is Adeel Alexander , Employee Care Manager for [employer], and I have been trying to reach you. The Employee Care  is a free-of-charge, confidential service provided to our employees and their family members covered by the [employer]. Part of my job is to follow up with members who have recently been in the hospital or emergency room, to help them coordinate their care and answer questions they may have about their visit. I am able to provide assistance with medication questions, scheduling needed follow-up appointments, and arranging services like home health or home medical equipment. I can also provide education regarding your hospital or ER visit as well as your medical conditions. As healthcare providers, we know that patients do better when they have close follow up with a primary care provider (PCP), especially after a hospital or emergency department visit. If you do not have a PCP, I can help you find one that is convenient to you and covered by your insurance. I can also help you understand any after visit instructions, such as what symptoms to watch out for, or any new or changed medications. Remember that you can access your After Visit Summary by logging into your "[employer] account. If you do not have a "[employer] account, I can help you request access. Our program is designed to provide you with the opportunity to have a [employer] care manager partner with you for your healthcare needs. Please contact me at the below number if I can provide you with assistance for any of the above services. Sincerely, Adeel Alexander RN  Employee Care Manager [address] [place] [address], [phone] [facility] Cell [phone] Fax [email or web address] 
 Omer LOPEZ [email or web address]

## 2017-11-07 NOTE — ED NOTES
Dr. Jose Antonio Cox has discharged patient; instructions reviewed by provider. Patient exited ED prior to RN reassessment.

## 2017-11-07 NOTE — ED PROVIDER NOTES
HPI Comments: 24 y.o. 15 week pregnant  female /A0 with past medical history significant for anxiety, depression, and chlamydia who presents ambulatory from [facility] for evaluation d/t vomiting during pregnancy. Pt reports vomiting dark brown emesis \"about every 10 minutes\" for the past 2 days. Pt reports inabiltity to tolerate anything PO. Pt states that she was seen in [facility] for same 5 days PTA 17 and d/c with Zofran and phenergan px. Pt reports good relief at home and was tolerating PO; however d/t insurance changes pt had 2 days without medication and vomiting returned. Pt reports getting Zofran refill and was able to take it at 13:00 today. Pt describes feeling generally weak and dizzy. Tonight at [facility] pt was given 50 mg phenergan IM and referred to ED. Pt has appointment with OBGYN in 3 days. Pt denies diarrhea. There are no other acute medical concerns at this time. UA from Pt 1st showed ketones, 6-10 WBC's, 2+ bacteria    Social hx: +Former tobacco smoker -Illicit drug use   PCP: ZOIE Mayen MD    Note written by Noris Siegel. Linn Robins, as dictated by Magnus Harmon MD 9:50 PM      The history is provided by the patient. No  was used.         Past Medical History:   Diagnosis Date    Anxiety     Chlamydia 10/2016    Depression     Headache     N&V (nausea and vomiting)     Routine Papanicolaou smear 2016    Nl with +yeast (media tab)     Snoring     SOB (shortness of breath)        Past Surgical History:   Procedure Laterality Date    HX TONSILLECTOMY      HX WISDOM TEETH EXTRACTION           Family History:   Problem Relation Age of Onset    No Known Problems Mother     Hypertension Father     No Known Problems Sister     No Known Problems Brother     Ovarian Cancer Maternal Aunt     Cancer Maternal Uncle      Lung     No Known Problems Paternal Aunt     No Known Problems Paternal Uncle     Cancer Maternal Grandmother      Lung     No Known Problems Maternal Grandfather     Diabetes Paternal Grandmother     Cancer Paternal Grandfather      Liver (?)    Cancer Other     Mental Retardation Other      Downs' syndrome       Social History     Social History    Marital status: SINGLE     Spouse name: N/A    Number of children: N/A    Years of education: N/A     Occupational History    Not on file. Social History Main Topics    Smoking status: Former Smoker    Smokeless tobacco: Never Used      Comment: Never used vapor or e-cigs     Alcohol use No      Comment: Twice a week     Drug use: No    Sexual activity: Yes     Partners: Male     Birth control/ protection: None     Other Topics Concern    Not on file     Social History Narrative     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         ALLERGIES: Review of patient's allergies indicates no known allergies. Review of Systems   Gastrointestinal: Positive for vomiting. Negative for diarrhea. Neurological: Positive for dizziness and weakness (generalized). All other systems reviewed and are negative. Vitals:    11/06/17 2143   BP: 134/75   Pulse: (!) 104   Resp: 16   Temp: 96.4 °F (35.8 °C)   SpO2: 98%   Weight: 68.9 kg (152 lb)   Height: 5' 4\" (1.626 m)            Physical Exam   Constitutional: She is oriented to person, place, and time. She appears well-developed and well-nourished. No distress. Pt is moderately ill-appearing. HENT:   Head: Normocephalic and atraumatic. Eyes: Conjunctivae are normal. No scleral icterus. Neck: Neck supple. No tracheal deviation present. Cardiovascular: Normal rate, regular rhythm, normal heart sounds and intact distal pulses. Exam reveals no gallop and no friction rub. No murmur heard. Pulmonary/Chest: Effort normal and breath sounds normal. She has no wheezes. She has no rales. Abdominal: Soft. She exhibits no distension. There is no tenderness.  There is no rebound and no guarding. Musculoskeletal: She exhibits no edema. Neurological: She is alert and oriented to person, place, and time. Skin: Skin is warm and dry. No rash noted. Psychiatric: She has a normal mood and affect. Nursing note and vitals reviewed. Note written by Rob. Minetta Phlegm, as dictated by Renan Chung MD 9:59 PM         [facility]  ED Course       Procedures    Progress note: states she feels much better after Zofran. She is drinking. Renan Chung MD  10:36 PM      A/P: hyperemesis gravidarum - plan is for fluids, anti-emetics, and eventual PO challenge; will check CBC, CMP, lipase, and UA. Suspect she will be able to be discharged on antiemetics with close Ob f/u.   Will treat with Keflex based on UA from Pt 1st.    Renan Chung MD  10:22 PM

## 2017-11-07 NOTE — ED NOTES
Bedside and Verbal shift change report given to Aisha Rosales RN (oncoming nurse) by Marsha Hopkins RN (offgoing nurse). Report included the following information SBAR, Kardex, ED Summary, Procedure Summary, Intake/Output, MAR and Recent Results.

## 2017-11-07 NOTE — ED NOTES
Pt states decrease in nausea, tolerating sips and chips. Coffee given to friend at bedside. Pt unable to provide urine sample at this time.

## 2017-11-07 NOTE — DISCHARGE INSTRUCTIONS
Extreme Nausea and Vomiting in Pregnancy: Care Instructions  Your Care Instructions    Nausea and vomiting (often called morning sickness) are common in pregnancy. They are caused by pregnancy hormones and happen most often in the first 3 months. Some women get very sick and are not able to keep down food and fluids. This extreme morning sickness is called hyperemesis gravidarum. It can lead to a dangerous loss of fluids in the body. It also can keep you from gaining weight and getting proper nutrition during your pregnancy. Your body fluids are put back in balance with water and minerals called electrolytes. Medicine may help if you have severe nausea and vomiting. Follow-up care is a key part of your treatment and safety. Be sure to make and go to all appointments, and call your doctor if you are having problems. It's also a good idea to know your test results and keep a list of the medicines you take. How can you care for yourself at home? · Take your medicines exactly as prescribed. Call your doctor if you think you are having a problem with your medicine. · Drink plenty of fluids to prevent dehydration. Choose water and other caffeine-free clear liquids until you feel better. Try sipping on sports drinks that have salt and sugar in them. · Eat a small snack, such as crackers, before you get out of bed. Wait a few minutes, then get out of bed slowly. · Keep food in your stomach, but not too much at once. An empty stomach can make nausea worse. Eat several small meals every day instead of three large meals. · Eat more protein and less fat. · Get plenty of vitamin B6 by eating whole grains, nuts, seeds, and legumes. You can take vitamin B6 tablets if your doctor says it is okay. · Try to avoid smells and foods that make you feel sick to your stomach. · Get lots of rest.  · You may want to try acupressure bands.  They put pressure on an acupressure point in the wrist. Some women feel better using the bands.  · Cynthia may also help you feel better. You can use it in tea, take it as a pill, or use a cynthia syrup that you can buy at a health food store. When should you call for help? Call 911 anytime you think you may need emergency care. For example, call if:  ? · You passed out (lost consciousness). ?Call your doctor now or seek immediate medical care if:  ? · You are sick to your stomach or cannot drink fluids. ? · You have symptoms of dehydration, such as:  ¨ Dry eyes and a dry mouth. ¨ Passing only a little urine. ¨ Feeling thirstier than usual.   ? · You are not able to keep down your medicines. ? · You have pain in your belly or pelvis. ? Watch closely for changes in your health, and be sure to contact your doctor if:  ? · You do not get better as expected. Where can you learn more? Go to http://rika-chayo.info/. Enter Q760 in the search box to learn more about \"Extreme Nausea and Vomiting in Pregnancy: Care Instructions. \"  Current as of: 2017  Content Version: 11.4  © 4572-2461 [employer]. Care instructions adapted under license by [employer] (which disclaims liability or warranty for this information). If you have questions about a medical condition or this instruction, always ask your healthcare professional. [address] any warranty or liability for your use of this information. Urinary Tract Infection in Pregnancy: Care Instructions  Your Care Instructions    A urinary tract infection, or UTI, is an infection of the bladder and other urinary structures. Most UTIs occur in the bladder. They often cause pain or burning when you urinate. UTI is the most common bacterial infection in pregnancy. If untreated, a UTI could lead to problems such as a kidney infection or  labor. Most UTIs can be cured with antibiotics. Your doctor will prescribe an antibiotic that is safe during pregnancy.  Be sure to finish your medicine so that the infection does not spread to your kidneys. Follow-up care is a key part of your treatment and safety. Be sure to make and go to all appointments, and call your doctor if you are having problems. It's also a good idea to know your test results and keep a list of the medicines you take. How can you care for yourself at home? · Take your antibiotics as directed. Do not stop taking them just because you feel better. You need to take the full course of antibiotics. · Drink extra water and other fluids for the next day or two. This will help wash out the bacteria causing the infection. If you have kidney, heart, or liver disease and have to limit fluids, talk with your doctor before you increase the amount of fluids you drink. · Do not drink alcohol. · Urinate often. Try to empty your bladder each time. Preventing UTIs  · Drink plenty of fluids, enough so that your urine is light yellow or clear like water. This helps you urinate often, which clears bacteria from your system. If you have kidney, heart, or liver disease and have to limit fluids, talk with your doctor before you increase the amount of fluids you drink. · Urinate when you first have the urge. · Urinate right after you have sex. This is the best way for women to avoid UTIs. · When going to the bathroom, wipe from front to back to keep bacteria from entering the vagina or urethra. When should you call for help? Call your doctor now or seek immediate medical care if:  ? · You have symptoms of a worse urinary tract infection. These may include:  ¨ Pain or burning when you urinate. ¨ A frequent need to urinate without being able to pass much urine. ¨ Pain in the flank, which is just below the rib cage and above the waist on either side of the back. ¨ Blood in your urine. ¨ A fever. ? Watch closely for changes in your health, and be sure to contact your doctor if:  ? · You do not get better as expected.    Where can you learn more? Go to http://rika-chayo.info/. Enter M982 in the search box to learn more about \"Urinary Tract Infection in Pregnancy: Care Instructions. \"  Current as of: March 16, 2017  Content Version: 11.4  © 1346-1328 Healthwise, [employer]. Care instructions adapted under license by [employer] (which disclaims liability or warranty for this information). If you have questions about a medical condition or this instruction, always ask your healthcare professional. [place] [address] any warranty or liability for your use of this information.

## 2017-11-08 ENCOUNTER — PATIENT OUTREACH (OUTPATIENT)
Dept: OTHER | Age: 22
End: 2017-11-08

## 2017-11-08 NOTE — PROGRESS NOTES
Patient on [facility] discharge report dated 11/8. Left message on voicemail. Will attempt to contact again. Need to complete post-discharge assessment.

## 2017-11-09 ENCOUNTER — PATIENT OUTREACH (OUTPATIENT)
Dept: OTHER | Age: 22
End: 2017-11-09

## 2017-11-09 ENCOUNTER — ROUTINE PRENATAL (OUTPATIENT)
Dept: OBGYN CLINIC | Age: 22
End: 2017-11-09

## 2017-11-09 VITALS
WEIGHT: 159.6 LBS | SYSTOLIC BLOOD PRESSURE: 121 MMHG | DIASTOLIC BLOOD PRESSURE: 68 MMHG | BODY MASS INDEX: 27.25 KG/M2 | HEIGHT: 64 IN

## 2017-11-09 DIAGNOSIS — O26.892 ABDOMINAL PAIN DURING PREGNANCY IN SECOND TRIMESTER: ICD-10-CM

## 2017-11-09 DIAGNOSIS — R10.9 ABDOMINAL PAIN DURING PREGNANCY IN SECOND TRIMESTER: ICD-10-CM

## 2017-11-09 DIAGNOSIS — D72.829 LEUKOCYTOSIS, UNSPECIFIED TYPE: Primary | ICD-10-CM

## 2017-11-09 PROBLEM — K59.09 OTHER CONSTIPATION: Status: ACTIVE | Noted: 2017-11-09

## 2017-11-09 RX ORDER — DOXYLAMINE SUCCINATE AND PYRIDOXINE HYDROCHLORIDE, DELAYED RELEASE TABLETS 10 MG/10 MG 10; 10 MG/1; MG/1
2 TABLET, DELAYED RELEASE ORAL
Qty: 120 TAB | Refills: 1 | Status: SHIPPED | OUTPATIENT
Start: 2017-11-09 | End: 2017-12-22 | Stop reason: SDUPTHER

## 2017-11-09 NOTE — PROGRESS NOTES
Problem List  Date Reviewed: 11/9/2017          Codes Class Noted    Hyperemesis affecting pregnancy, antepartum ICD-10-CM: O21.0  ICD-9-CM: 643.03  10/3/2017        Nausea and vomiting of pregnancy, antepartum ICD-10-CM: O21.9  ICD-9-CM: 643.93  9/25/2017        Pregnant ICD-10-CM: Z34.90  ICD-9-CM: V22.2  9/13/2017        Migraine without aura, intractable, without status migrainosus ICD-10-CM: G43.019  ICD-9-CM: 346.11  5/19/2017        UTI (lower urinary tract infection) ICD-10-CM: N39.0  ICD-9-CM: 599.0  10/16/2015

## 2017-11-09 NOTE — PATIENT INSTRUCTIONS
Weeks 14 to 18 of Your Pregnancy: Care Instructions  Your Care Instructions    During this time, you may start to \"show,\" so that you look pregnant to people around you. You may also notice some changes in your skin, such as itchy spots on your palms or acne on your face. Your baby is now able to pass urine, and your baby's first stool (meconium) is starting to collect in his or her intestines. Hair is also beginning to grow on your baby's head. At your next visit, between weeks 18 and 20, your doctor may do an ultrasound test. The test allows your doctor to check for certain problems. Your doctor can also tell the sex of your baby. This is a good time to think about whether you want to know whether your baby is a boy or a girl. Talk to your doctor about getting a flu shot to help keep you healthy during your pregnancy. As your pregnancy moves along, it is common to worry or feel anxious. Your body is changing a lot. And you are thinking about giving birth, the health of your baby, and becoming a parent. You can learn to cope with any anxiety and stress you feel. Follow-up care is a key part of your treatment and safety. Be sure to make and go to all appointments, and call your doctor if you are having problems. It's also a good idea to know your test results and keep a list of the medicines you take. How can you care for yourself at home? ?Reduce stress  ? · Ask for help with cooking and housekeeping. ? · Figure out who or what causes your stress. Avoid these people or situations as much as possible. ? · Relax every day. Taking 10- to 15-minute breaks can make a big difference. Take a walk, listen to music, or take a warm bath. ? · Learn relaxation techniques at prenatal or yoga class. Or buy a relaxation tape. ? · List your fears about having a baby and becoming a parent. Share the list with someone you trust. Decide which worries are really small, and try to let them go. Exercise  ?  · If you did not exercise much before pregnancy, start slowly. Walking is best. Jeff Celia yourself, and do a little more every day. ? · Brisk walking, easy jogging, low-impact aerobics, water aerobics, and yoga are good choices. Some sports, such as scuba diving, horseback riding, downhill skiing, gymnastics, and water skiing, are not a good idea. ? · Try to do at least 2½ hours a week of moderate exercise, such as a fast walk. One way to do this is to be active 30 minutes a day, at least 5 days a week. It's fine to be active in blocks of 10 minutes or more throughout your day and week. ? · Wear loose clothing. And wear shoes and a bra that provide good support. ? · Warm up and cool down to start and finish your exercise. ? · If you want to use weights, be sure to use light weights. They reduce stress on your joints. ?Stay at the best weight for you  ? · Experts recommend that you gain about 1 pound a month during the first 3 months of your pregnancy. ? · Experts recommend that you gain about 1 pound a week during your last 6 months of pregnancy, for a total weight gain of 25 to 35 pounds. ? · If you are underweight, you will need to gain more weight (about 28 to 40 pounds). ? · If you are overweight, you may not need to gain as much weight (about 15 to 25 pounds). ? · If you are gaining weight too fast, use common sense. Exercise every day, and limit sweets, fast foods, and fats. Choose lean meats, fruits, and vegetables. ? · If you are having twins or more, your doctor may refer you to a dietitian. Where can you learn more? Go to http://rika-chayo.info/. Enter V867 in the search box to learn more about \"Weeks 14 to 18 of Your Pregnancy: Care Instructions. \"  Current as of: March 16, 2017  Content Version: 11.4  © 3520-0411 [employer]. Care instructions adapted under license by [employer] (which disclaims liability or warranty for this information).  If you have questions about a medical condition or this instruction, always ask your healthcare professional. [place] [address] any warranty or liability for your use of this information.

## 2017-11-09 NOTE — PROGRESS NOTES
Seen in ED x 2 this wk. With severe N/V. Dx with UTI and placed on Keflex completes course tomorrow. C&S reviewed from 11/3 and pos for Urogenital pat 40,000. Elevated WBC that was not addressed. Denies fever or chills. Neg for abd Guarding, rebound tenderness or irritation. Neg Psoas sign. Having difficulty having BM. Infrequently able to go. Disc use of Good hydration, Fiber additives, Stool softeners and Miralax prn. Taking Diclegis and Zofran for NVP, but not taking Diclegis as directed. Will try to decrease use of Zofran. Rx Diclegis 2 tabs QHS and may add prn 1 dose in am and afternoon. Repeat CBC and Urine C&S today.

## 2017-11-09 NOTE — PROGRESS NOTES
Second attempt to reach patient for [facility] epidosde and  Program, and discharge assessment. Discreet VM left. UTR letter sent previously.

## 2017-11-10 LAB
BASOPHILS # BLD AUTO: 0 X10E3/UL (ref 0–0.2)
BASOPHILS NFR BLD AUTO: 0 %
EOSINOPHIL # BLD AUTO: 0 X10E3/UL (ref 0–0.4)
EOSINOPHIL NFR BLD AUTO: 0 %
ERYTHROCYTE [DISTWIDTH] IN BLOOD BY AUTOMATED COUNT: 14.6 % (ref 12.3–15.4)
HCT VFR BLD AUTO: 33.6 % (ref 34–46.6)
HGB BLD-MCNC: 11.4 G/DL (ref 11.1–15.9)
IMM GRANULOCYTES # BLD: 0 X10E3/UL (ref 0–0.1)
IMM GRANULOCYTES NFR BLD: 0 %
LYMPHOCYTES # BLD AUTO: 1.6 X10E3/UL (ref 0.7–3.1)
LYMPHOCYTES NFR BLD AUTO: 16 %
MCH RBC QN AUTO: 29.6 PG (ref 26.6–33)
MCHC RBC AUTO-ENTMCNC: 33.9 G/DL (ref 31.5–35.7)
MCV RBC AUTO: 87 FL (ref 79–97)
MONOCYTES # BLD AUTO: 0.8 X10E3/UL (ref 0.1–0.9)
MONOCYTES NFR BLD AUTO: 8 %
NEUTROPHILS # BLD AUTO: 7.8 X10E3/UL (ref 1.4–7)
NEUTROPHILS NFR BLD AUTO: 76 %
PLATELET # BLD AUTO: 274 X10E3/UL (ref 150–379)
RBC # BLD AUTO: 3.85 X10E6/UL (ref 3.77–5.28)
WBC # BLD AUTO: 10.3 X10E3/UL (ref 3.4–10.8)

## 2017-11-10 NOTE — PROGRESS NOTES
Please let Jose Roberto Hough know her White Blood Cells are back to normal. Urine culture won't be back until Monday.  Thanks, Nabor Hammonds

## 2017-11-11 LAB — BACTERIA UR CULT: NO GROWTH

## 2017-12-07 ENCOUNTER — PATIENT OUTREACH (OUTPATIENT)
Dept: OTHER | Age: 22
End: 2017-12-07

## 2017-12-18 ENCOUNTER — TELEPHONE (OUTPATIENT)
Dept: NEUROLOGY | Age: 22
End: 2017-12-18

## 2017-12-18 ENCOUNTER — ROUTINE PRENATAL (OUTPATIENT)
Dept: OBGYN CLINIC | Age: 22
End: 2017-12-18

## 2017-12-18 VITALS — DIASTOLIC BLOOD PRESSURE: 66 MMHG | WEIGHT: 173 LBS | BODY MASS INDEX: 29.7 KG/M2 | SYSTOLIC BLOOD PRESSURE: 112 MMHG

## 2017-12-18 DIAGNOSIS — Z3A.20 20 WEEKS GESTATION OF PREGNANCY: ICD-10-CM

## 2017-12-18 DIAGNOSIS — Z34.02 ENCOUNTER FOR SUPERVISION OF NORMAL FIRST PREGNANCY IN SECOND TRIMESTER: Primary | ICD-10-CM

## 2017-12-18 NOTE — MR AVS SNAPSHOT
Visit Information Date & Time Provider Department Dept. Phone Encounter #  
 12/18/2017 11:20 AM Rupa Licona NP [facility] [address] [place] [address] [phone] [id number] Upcoming Health Maintenance Date Due  
 HPV AGE 9Y-34Y (1 of 3 - Female 3 Dose Series) 11/22/2005 Influenza Age 5 to Adult 8/1/2017 PAP AKA CERVICAL CYTOLOGY 10/12/2020 Allergies as of 12/18/2017  Review Complete On: 12/18/2017 By: Rupa Licona NP No Known Allergies Current Immunizations  Never Reviewed No immunizations on file. Not reviewed this visit Vitals BP Weight(growth percentile) LMP BMI OB Status Smoking Status 112/66 173 lb (78.5 kg) 08/01/2017 (Approximate) 29.7 kg/m2 Pregnant Former Smoker BMI and BSA Data Body Mass Index Body Surface Area  
 29.7 kg/m 2 1.88 m 2 Preferred Pharmacy Pharmacy Name Phone [facility] [address] [place] [address] Your Updated Medication List  
  
   
This list is accurate as of: 12/18/17 11:38 AM.  Always use your most recent med list.  
  
  
  
  
 doxylamine-pyridoxine (vit B6) 10-10 mg Tbec DR tablet Commonly known as:  Antonia Nina Take 2 Tabs by mouth nightly. Indications: Take 2 tablets a bedtime daily, can add 1 tablet in am and 1 tablet in pm prn  
  
 ondansetron 4 mg disintegrating tablet Commonly known as:  ZOFRAN ODT Take 1 Tab by mouth every eight (8) hours as needed for Nausea. PRENATAL DHA+COMPLETE PRENATAL -300 mg-mcg-mg Cmpk Generic drug:  [id number] aj-folic-dha Take  by mouth.  
  
 promethazine 12.5 mg tablet Commonly known as:  PHENERGAN Take 1 Tab by mouth every six (6) hours as needed for Nausea. Introducing [facility]! Dear Dago Baumann: Thank you for requesting a Keduo account. Our records indicate that you already have an active Keduo account.   You can access your account anytime at [email or web address]://[employer]. [employer] Did you know that you can access your hospital and ER discharge instructions at any time in Shopde[employer]? You can also review all of your test results from your hospital stay or ER visit. Additional Information If you have questions, please visit the Frequently Asked Questions section of the Shopdeca website at [email or web address]. [email or web address]/. Remember, Shopdeca is NOT to be used for urgent needs. For medical emergencies, dial 911. Now available from your iPhone and Android! Please provide this summary of care documentation to your next provider. Your primary care clinician is listed as Sheri Mathew. If you have any questions after today's visit, please call [phone].

## 2017-12-18 NOTE — PROGRESS NOTES
Rev FAS, Gender secret reveal on Jamir yolanda. Feeling flutters. Denies VB/LOF or cramping. Post Placenta. Still taking Diclegis most days. Anticipatory Guidance.  Encouraged to meet [facility] and [employer]

## 2017-12-18 NOTE — TELEPHONE ENCOUNTER
Spoke to patient and informed her of providers recommendations. Patient stated she stopped trokendi upon learning she was pregnant. Patient approximately 5 months pregnant. Patient stated understanding.

## 2017-12-22 DIAGNOSIS — O21.9 NAUSEA AND VOMITING IN PREGNANCY: Primary | ICD-10-CM

## 2017-12-26 RX ORDER — DOXYLAMINE SUCCINATE AND PYRIDOXINE HYDROCHLORIDE, DELAYED RELEASE TABLETS 10 MG/10 MG 10; 10 MG/1; MG/1
2 TABLET, DELAYED RELEASE ORAL
Qty: 120 TAB | Refills: 1 | Status: SHIPPED | OUTPATIENT
Start: 2017-12-26 | End: 2018-02-01 | Stop reason: SDUPTHER

## 2017-12-26 NOTE — TELEPHONE ENCOUNTER
From: Brayden Smith  To: Obi Rolon NP  Sent: 12/22/2017 3:03 PM EST  Subject: Medication Renewal Request    Original authorizing provider: ZOIE [place] would like a refill of the following medications:  doxylamine-pyridoxine, vit B6, (DICLEGIS) 10-10 mg TbEC DR corky Rolon NP]    Preferred pharmacy: [address] [place] [address] 83:

## 2018-01-16 ENCOUNTER — ROUTINE PRENATAL (OUTPATIENT)
Dept: OBGYN CLINIC | Age: 23
End: 2018-01-16

## 2018-01-16 VITALS
SYSTOLIC BLOOD PRESSURE: 104 MMHG | DIASTOLIC BLOOD PRESSURE: 66 MMHG | WEIGHT: 172.4 LBS | BODY MASS INDEX: 29.43 KG/M2 | HEIGHT: 64 IN

## 2018-01-16 DIAGNOSIS — Z34.02 ENCOUNTER FOR SUPERVISION OF NORMAL FIRST PREGNANCY IN SECOND TRIMESTER: Primary | ICD-10-CM

## 2018-01-16 NOTE — PATIENT INSTRUCTIONS
Weeks 22 to 26 of Your Pregnancy: Care Instructions  Your Care Instructions    As you enter your 7th month of pregnancy at week 26, your baby's lungs are growing stronger and getting ready to breathe. You may notice that your baby responds to the sound of your or your partner's voice. You may also notice that your baby does less turning and twisting and more squirming or jerking. Jerking often means that your baby has the hiccups. Hiccups are perfectly normal and are only temporary. You may want to think about attending a childbirth preparation class. This is also a good time to start thinking about whether you want to have pain medicine during labor. Most pregnant women are tested for gestational diabetes between weeks 25 and 28. Gestational diabetes occurs when your blood sugar level gets too high when you're pregnant. The test is important, because you can have gestational diabetes and not know it. But the condition can cause problems for your baby. Follow-up care is a key part of your treatment and safety. Be sure to make and go to all appointments, and call your doctor if you are having problems. It's also a good idea to know your test results and keep a list of the medicines you take. How can you care for yourself at home? Ease discomfort from your baby's kicking  · Change your position. Sometimes this will cause your baby to change position too. · Take a deep breath while you raise your arm over your head. Then breathe out while you drop your arm. Do Kegel exercises to prevent urine from leaking  · You can do Kegel exercises while you stand or sit. ¨ Squeeze the same muscles you would use to stop your urine. Your belly and thighs should not move. ¨ Hold the squeeze for 3 seconds, and then relax for 3 seconds. ¨ Start with 3 seconds. Then add 1 second each week until you are able to squeeze for 10 seconds. ¨ Repeat the exercise 10 to 15 times for each session.  Do three or more sessions each day.  Ease or reduce swelling in your feet, ankles, hands, and fingers  · If your fingers are puffy, take off your rings. · Do not eat high-salt foods, such as potato chips. · Prop up your feet on a stool or couch as much as possible. Sleep with pillows under your feet. · Do not stand for long periods of time or wear tight shoes. · Wear support stockings. Where can you learn more? Go to http://rika-chayo.info/. Enter G264 in the search box to learn more about \"Weeks 22 to 26 of Your Pregnancy: Care Instructions. \"  Current as of: March 16, 2017  Content Version: 11.4  © 5145-0223 Healthwise, [employer]. Care instructions adapted under license by [employer] (which disclaims liability or warranty for this information). If you have questions about a medical condition or this instruction, always ask your healthcare professional. [place] [address] any warranty or liability for your use of this information.

## 2018-01-16 NOTE — PROGRESS NOTES
Problem List  Date Reviewed: 2017          Codes Class Noted    Other constipation ICD-10-CM: K59.09  ICD-9-CM: 564.09  2017        Hyperemesis affecting pregnancy, antepartum ICD-10-CM: O21.0  ICD-9-CM: 643.03  10/3/2017        Nausea and vomiting of pregnancy, antepartum ICD-10-CM: O21.9  ICD-9-CM: 643.93  2017        Pregnant ICD-10-CM: Z34.90  ICD-9-CM: V22.2  2017    Overview Signed 2017 12:17 PM by Patrick Cote NP     CNCANDICE pt  Post Placenta               Migraine without aura, intractable, without status migrainosus ICD-10-CM: G43.019  ICD-9-CM: 346.11  2017        UTI (lower urinary tract infection) ICD-10-CM: N39.0  ICD-9-CM: 599.0  10/16/2015            S: Having a girl! Still some nausea. Glucola next visit.   O:   FH 24     ABSNT - gravid at umbilicus  A: 21 yo G1 SIUP 24 weeks  P: LORI 4 weeks  Reviewed gtt and TDAP for next visit  Gilma Bowling CNM

## 2018-02-01 DIAGNOSIS — O21.9 NAUSEA AND VOMITING IN PREGNANCY: ICD-10-CM

## 2018-02-01 DIAGNOSIS — O21.9 NAUSEA AND VOMITING DURING PREGNANCY: Primary | ICD-10-CM

## 2018-02-01 RX ORDER — DOXYLAMINE SUCCINATE AND PYRIDOXINE HYDROCHLORIDE, DELAYED RELEASE TABLETS 10 MG/10 MG 10; 10 MG/1; MG/1
2 TABLET, DELAYED RELEASE ORAL
Qty: 120 TAB | Refills: 1 | Status: SHIPPED | OUTPATIENT
Start: 2018-02-01 | End: 2018-03-30 | Stop reason: SDUPTHER

## 2018-02-01 NOTE — TELEPHONE ENCOUNTER
From: Andre Nails  To: David Luis NP  Sent: 2/1/2018 1:19 PM EST  Subject: Medication Renewal Request    Original authorizing provider: ZOIE Laurent would like a refill of the following medications:  doxylamine-pyridoxine, vit B6, (DICLEGIS) 10-10 mg TbEMARCIA Luis NP]    Preferred pharmacy: [facility] - [address].      Comment:

## 2018-02-13 ENCOUNTER — ROUTINE PRENATAL (OUTPATIENT)
Dept: OBGYN CLINIC | Age: 23
End: 2018-02-13

## 2018-02-13 VITALS — BODY MASS INDEX: 32.82 KG/M2 | DIASTOLIC BLOOD PRESSURE: 64 MMHG | SYSTOLIC BLOOD PRESSURE: 106 MMHG | WEIGHT: 191.2 LBS

## 2018-02-13 DIAGNOSIS — Z34.93 NORMAL PREGNANCY, THIRD TRIMESTER: Primary | ICD-10-CM

## 2018-02-13 DIAGNOSIS — Z3A.20 20 WEEKS GESTATION OF PREGNANCY: ICD-10-CM

## 2018-02-13 DIAGNOSIS — Z23 ENCOUNTER FOR IMMUNIZATION: ICD-10-CM

## 2018-02-13 LAB
GTT 60 MIN, EXTERNAL: 91
HCT, EXTERNAL: 33.6
HGB, EXTERNAL: 11.3
HIV, EXTERNAL: NON REACTIVE
PLATELET CNT,   EXTERNAL: 269
RPR, EXTERNAL: NON REACTIVE

## 2018-02-13 NOTE — PATIENT INSTRUCTIONS
Weeks 26 to 30 of Your Pregnancy: Care Instructions  Your Care Instructions    You are now in your last trimester of pregnancy. Your baby is growing rapidly. And you'll probably feel your baby moving around more often. Your doctor may ask you to count your baby's kicks. Your back may ache as your body gets used to your baby's size and length. If you haven't already had the Tdap shot during this pregnancy, talk to your doctor about getting it. It will help protect your  against pertussis infection. During this time, it's important to take care of yourself and pay attention to what your body needs. If you feel sexual, explore ways to be close with your partner that match your comfort and desire. Use the tips provided in this care sheet to find ways to be sexual in your own way. Follow-up care is a key part of your treatment and safety. Be sure to make and go to all appointments, and call your doctor if you are having problems. It's also a good idea to know your test results and keep a list of the medicines you take. How can you care for yourself at home? Take it easy at work  · Take frequent breaks. If possible, stop working when you are tired, and rest during your lunch hour. · Take bathroom breaks every 2 hours. · Change positions often. If you sit for long periods, stand up and walk around. · When you stand for a long time, keep one foot on a low stool with your knee bent. After standing a lot, sit with your feet up. · Avoid fumes, chemicals, and tobacco smoke. Be sexual in your own way  · Having sex during pregnancy is okay, unless your doctor tells you not to. · You may be very interested in sex, or you may have no interest at all. · Your growing belly can make it hard to find a good position during intercourse. [place] and explore. · You may get cramps in your uterus when your partner touches your breasts.   · A back rub may relieve the backache or cramps that sometimes follow orgasm. Learn about  labor  · Watch for signs of  labor. You may be going into labor if:  ¨ You have menstrual-like cramps, with or without nausea. ¨ You have about 4 or more contractions in 20 minutes, or about 8 or more within 1 hour, even after you have had a glass of water and are resting. ¨ You have a low, dull backache that does not go away when you change your position. ¨ You have pain or pressure in your pelvis that comes and goes in a pattern. ¨ You have intestinal cramping or flu-like symptoms, with or without diarrhea. ¨ You notice an increase or change in your vaginal discharge. Discharge may be heavy, mucus-like, watery, or streaked with blood. ¨ Your water breaks. · If you think you have  labor:  ¨ Drink 2 or 3 glasses of water or juice. Not drinking enough fluids can cause contractions. ¨ Stop what you are doing, and empty your bladder. Then lie down on your left side for at least 1 hour. ¨ While lying on your side, find your breast bone. Put your fingers in the soft spot just below it. Move your fingers down toward your belly button to find the top of your uterus. Check to see if it is tight. ¨ Contractions can be weak or strong. Record your contractions for an hour. Time a contraction from the start of one contraction to the start of the next one. ¨ Single or several strong contractions without a pattern are called Adrian-Guevara contractions. They are practice contractions but not the start of labor. They often stop if you change what you are doing. ¨ Call your doctor if you have regular contractions. Where can you learn more? Go to http://rika-chayo.info/. Enter F421 in the search box to learn more about \"Weeks 26 to 30 of Your Pregnancy: Care Instructions. \"  Current as of: 2017  Content Version: 11.4  © 3526-1372 [employer].  Care instructions adapted under license by [employer] (which disclaims liability or warranty for this information). If you have questions about a medical condition or this instruction, always ask your healthcare professional. [place] [address] any warranty or liability for your use of this information.

## 2018-02-13 NOTE — PROGRESS NOTES
Patient doing well at this time. Good FM. Denies VB, LOF, or contractions. Mild heartburn. Mild fatigue. Baby girl \"Andreia\". Partner Ethan Ybarra at visit with patient today. 28 week labs and tdap today. Tdap vaccine, Lot [id number], Exp 5/30/20, given IM in left deltoid without complication per MD order. Consent signed.      RTC: 2 weeks or sooner prn    Ester Kern MD  2/13/2018  12:34 PM

## 2018-02-14 LAB
ERYTHROCYTE [DISTWIDTH] IN BLOOD BY AUTOMATED COUNT: 13.8 % (ref 12.3–15.4)
GLUCOSE 1H P 50 G GLC PO SERPL-MCNC: 91 MG/DL (ref 65–129)
HCT VFR BLD AUTO: 33.6 % (ref 34–46.6)
HGB BLD-MCNC: 11.3 G/DL (ref 11.1–15.9)
HIV 1+2 AB+HIV1 P24 AG SERPL QL IA: NON REACTIVE
MCH RBC QN AUTO: 31 PG (ref 26.6–33)
MCHC RBC AUTO-ENTMCNC: 33.6 G/DL (ref 31.5–35.7)
MCV RBC AUTO: 92 FL (ref 79–97)
PLATELET # BLD AUTO: 269 X10E3/UL (ref 150–379)
RBC # BLD AUTO: 3.64 X10E6/UL (ref 3.77–5.28)
RPR SER QL: NON REACTIVE
WBC # BLD AUTO: 14.5 X10E3/UL (ref 3.4–10.8)

## 2018-02-27 ENCOUNTER — ROUTINE PRENATAL (OUTPATIENT)
Dept: OBGYN CLINIC | Age: 23
End: 2018-02-27

## 2018-02-27 VITALS — BODY MASS INDEX: 33.92 KG/M2 | DIASTOLIC BLOOD PRESSURE: 66 MMHG | WEIGHT: 197.6 LBS | SYSTOLIC BLOOD PRESSURE: 108 MMHG

## 2018-02-27 DIAGNOSIS — Z3A.20 20 WEEKS GESTATION OF PREGNANCY: ICD-10-CM

## 2018-02-27 RX ORDER — LANOLIN ALCOHOL/MO/W.PET/CERES
CREAM (GRAM) TOPICAL DAILY
COMMUNITY
End: 2018-05-16

## 2018-02-27 NOTE — PROGRESS NOTES
Patient doing well overall. Does report intermittent pruritic rash on upper extremities, now resolved. Discussed benadryl prn. No rash on palms or soles. Good FM, no LOF, no VB, no ctx. Patient with concerns about postpartum depression, though currently mood stable, no SI/HI. Discussed at length today. Counseling resources provided. RTC: 2 weeks or sooner prn.     Carlos Gerard MD  2/27/2018  12:43 PM

## 2018-02-27 NOTE — PATIENT INSTRUCTIONS
Weeks 30 to 32 of Your Pregnancy: Care Instructions  Your Care Instructions    You have made it to the final months of your pregnancy. By now, your baby is really starting to look like a baby, with hair and plump skin. As you enter the final weeks of pregnancy, the reality of having a baby may start to set in. This is the time to settle on a name, get your household in order, set up a safe nursery, and find quality  if needed. Doing these things in advance will allow you to focus on caring for and enjoying your new baby. You may also want to have a tour of your hospital's labor and delivery unit to get a better idea of what to expect while you are in the hospital.  During these last months, it is very important to take good care of yourself and pay attention to what your body needs. If your doctor says it is okay for you to work, don't push yourself too hard. Use the tips provided in this care sheet to ease heartburn and care for varicose veins. If you haven't already had the Tdap shot during this pregnancy, talk to your doctor about getting it. It will help protect your  against pertussis infection. Follow-up care is a key part of your treatment and safety. Be sure to make and go to all appointments, and call your doctor if you are having problems. It's also a good idea to know your test results and keep a list of the medicines you take. How can you care for yourself at home? Pay attention to your baby's movements  · You should feel your baby move several times every day. · Your baby now turns less, and kicks and jabs more. · Your baby sleeps 20 to 45 minutes at a time and is more active at certain times of day. · If your doctor wants you to count your baby's kicks:  ¨ Empty your bladder, and lie on your side or relax in a comfortable chair. ¨ Write down your start time. ¨ Pay attention only to your baby's movements. Count any movement except hiccups.   ¨ After you have counted 10 movements, write down your stop time. ¨ Write down how many minutes it took for your baby to move 10 times. ¨ If an hour goes by and you have not recorded 10 movements, have something to eat or drink and then count for another hour. If you do not record 10 movements in either hour, call your doctor. Ease heartburn  · Eat small, frequent meals. · Do not eat chocolate, peppermint, or very spicy foods. Avoid drinks with caffeine, such as coffee, tea, and sodas. · Avoid bending over or lying down after meals. · Talk a short walk after you eat. · If heartburn is a problem at night, do not eat for 2 hours before bedtime. · Take antacids like Mylanta, Maalox, Rolaids, or Tums. Do not take antacids that have sodium bicarbonate. Care for varicose veins  · Varicose veins are blood vessels that stretch out with the extra blood during pregnancy. Your legs may ache or throb. Most varicose veins will go away after the birth. · Avoid standing for long periods of time. Sit with your legs crossed at the ankles, not the knees. · Sit with your feet propped up. · Avoid tight clothing or stockings. Wear support hose. · Exercise regularly. Try walking for at least 30 minutes a day. Where can you learn more? Go to http://rika-chayo.info/. Enter E315 in the search box to learn more about \"Weeks 30 to 32 of Your Pregnancy: Care Instructions. \"  Current as of: March 16, 2017  Content Version: 11.4  © 0898-2800 [employer]. Care instructions adapted under license by [email or web address] (which disclaims liability or warranty for this information). If you have questions about a medical condition or this instruction, always ask your healthcare professional. [place] [address] any warranty or liability for your use of this information.

## 2018-03-13 ENCOUNTER — ROUTINE PRENATAL (OUTPATIENT)
Dept: OBGYN CLINIC | Age: 23
End: 2018-03-13

## 2018-03-13 VITALS — WEIGHT: 201.4 LBS | SYSTOLIC BLOOD PRESSURE: 110 MMHG | BODY MASS INDEX: 34.57 KG/M2 | DIASTOLIC BLOOD PRESSURE: 68 MMHG

## 2018-03-13 DIAGNOSIS — Z3A.20 20 WEEKS GESTATION OF PREGNANCY: ICD-10-CM

## 2018-03-13 NOTE — PATIENT INSTRUCTIONS

## 2018-03-13 NOTE — PROGRESS NOTES
Patient doing well. Rash resolved. Good FM, no LOF, VB or ctx. She is getting excited about the baby! Mood stable. RTC: 2 weeks.

## 2018-03-28 ENCOUNTER — ROUTINE PRENATAL (OUTPATIENT)
Dept: OBGYN CLINIC | Age: 23
End: 2018-03-28

## 2018-03-28 VITALS — DIASTOLIC BLOOD PRESSURE: 70 MMHG | SYSTOLIC BLOOD PRESSURE: 114 MMHG | WEIGHT: 210.4 LBS | BODY MASS INDEX: 36.12 KG/M2

## 2018-03-28 DIAGNOSIS — Z3A.34 34 WEEKS GESTATION OF PREGNANCY: Primary | ICD-10-CM

## 2018-03-28 NOTE — PATIENT INSTRUCTIONS
Backache During Pregnancy: Care Instructions  Your Care Instructions    Back pain has many possible causes. It is often caused by problems with muscles and ligaments in your back. The extra weight during pregnancy can put stress on your back. Moving, lifting, standing, sitting, or sleeping in an awkward way also can strain your back. Back pain can also be a sign of labor. Although it may hurt a lot, back pain often improves on its own. Use good home treatment, and take care not to stress your back. Follow-up care is a key part of your treatment and safety. Be sure to make and go to all appointments, and call your doctor if you are having problems. It's also a good idea to know your test results and keep a list of the medicines you take. How can you care for yourself at home? · Ask your doctor about taking acetaminophen (Tylenol) for pain. Do not take aspirin, ibuprofen (Advil, Motrin), or naproxen (Aleve). · Do not take two or more pain medicines at the same time unless the doctor told you to. Many pain medicines have acetaminophen, which is Tylenol. Too much acetaminophen (Tylenol) can be harmful. · Lie on your side with your knees and hips bent and a pillow between your legs. This reduces stress on your back. · Put ice or cold packs on your back for 10 to 20 minutes at a time, several times a day. Put a thin cloth between the ice and your skin. · Warm baths may also help reduce pain. · Change positions every 30 minutes. Take breaks if you must sit for a long time. Get up and walk around. · Ask your doctor about how much exercise you can do. You may feel better taking short walks or doing gentle movements and stretching in a swimming pool. · Ask your doctor about exercises to stretch and strengthen your back. When should you call for help? Call your doctor now or seek immediate medical care if:  ? · You think you are in labor.    ? · You have new numbness in your buttocks, genital or rectal areas, or legs. ? · You have a new loss of bowel or bladder control. ? Watch closely for changes in your health, and be sure to contact your doctor if:  ? · You do not get better as expected. Where can you learn more? Go to http://rika-chayo.info/. Enter Q368 in the search box to learn more about \"Backache During Pregnancy: Care Instructions. \"  Current as of: March 16, 2017  Content Version: 11.4  © 1635-4230 [employer], [employer]. Care instructions adapted under license by [employer] (which disclaims liability or warranty for this information). If you have questions about a medical condition or this instruction, always ask your healthcare professional. [address] any warranty or liability for your use of this information.

## 2018-03-30 DIAGNOSIS — O21.9 NAUSEA AND VOMITING IN PREGNANCY: ICD-10-CM

## 2018-04-02 RX ORDER — DOXYLAMINE SUCCINATE AND PYRIDOXINE HYDROCHLORIDE, DELAYED RELEASE TABLETS 10 MG/10 MG 10; 10 MG/1; MG/1
2 TABLET, DELAYED RELEASE ORAL
Qty: 120 TAB | Refills: 1 | Status: SHIPPED | OUTPATIENT
Start: 2018-04-02 | End: 2018-05-16

## 2018-04-02 NOTE — TELEPHONE ENCOUNTER
From: Gabby Harris  To: Tea Robertson NP  Sent: 3/30/2018 8:21 AM EDT  Subject: Medication Renewal Request    Original authorizing provider: ZOIE Hillbarbara  Xochitl Darryn would like a refill of the following medications:  doxylamine-pyridoxine, vit B6, (DICLEGIS) 10-10 mg TbEC DR tablet Tea Robertson NP]    Preferred pharmacy: [facility] [address] [place] [address], [address] [place] [address]    Comment:

## 2018-04-10 ENCOUNTER — ROUTINE PRENATAL (OUTPATIENT)
Dept: OBGYN CLINIC | Age: 23
End: 2018-04-10

## 2018-04-10 VITALS — WEIGHT: 212.2 LBS | BODY MASS INDEX: 36.42 KG/M2 | SYSTOLIC BLOOD PRESSURE: 116 MMHG | DIASTOLIC BLOOD PRESSURE: 74 MMHG

## 2018-04-10 DIAGNOSIS — Z34.93 NORMAL PREGNANCY, THIRD TRIMESTER: Primary | ICD-10-CM

## 2018-04-10 LAB — GRBS, EXTERNAL: POSITIVE

## 2018-04-10 NOTE — PROGRESS NOTES
Overall doing well, poor sleep last night, mild HA this morning. Mild swelling bilat lower extremities, unchanged. Few susan yeung contractions. No vaginal bleeding or LOF. Good FM. GBS today.      Cervix closed/long/-2 (low in pelvis), suspect cephalic by Leopolds and exam.     RTC: 1 week, labor precautions and FKCs reivewed

## 2018-04-10 NOTE — PATIENT INSTRUCTIONS

## 2018-04-12 LAB — GP B STREP DNA SPEC QL NAA+PROBE: POSITIVE

## 2018-04-16 ENCOUNTER — ROUTINE PRENATAL (OUTPATIENT)
Dept: OBGYN CLINIC | Age: 23
End: 2018-04-16

## 2018-04-16 ENCOUNTER — TELEPHONE (OUTPATIENT)
Dept: OBGYN CLINIC | Age: 23
End: 2018-04-16

## 2018-04-16 VITALS — WEIGHT: 212.8 LBS | DIASTOLIC BLOOD PRESSURE: 78 MMHG | BODY MASS INDEX: 36.53 KG/M2 | SYSTOLIC BLOOD PRESSURE: 122 MMHG

## 2018-04-16 DIAGNOSIS — Z3A.20 20 WEEKS GESTATION OF PREGNANCY: ICD-10-CM

## 2018-04-16 DIAGNOSIS — O47.9 UTERINE CONTRACTIONS DURING PREGNANCY: Primary | ICD-10-CM

## 2018-04-16 NOTE — PATIENT INSTRUCTIONS
Early Stage of Labor at Home: Care Instructions  Your Care Instructions  If you came to the hospital while in early labor, your doctor may have asked if you want to labor at home until your contractions are stronger. Many women stay at home during early labor. This is often the longest part of the birthing process. It may last up to 2 to 3 days. Contractions are mild to moderate and shorter (about 30 to 45 seconds). You can usually keep talking during them. Contractions may also be irregular, about 5 to 20 minutes apart. They may even stop for a while. It helps to stay as relaxed as you can during this time. You can spend some or all of your early labor at home or anywhere else you may be comfortable. If you live far from the hospital or birthing center, you may want to think about going somewhere nearby so you can get back to the hospital quickly. For some women, there may be benefits to staying home during early labor, such as avoiding medicines or procedures. As labor progresses, you'll shift from early labor to active labor. During this time, contractions get more intense. They occur more often, about every 2 to 3 minutes. They also last longer, about 50 to 70 seconds. You will feel them even when you change positions and walk or move around. It may be hard to tell if you are in active labor. If you aren't sure, call your doctor or midwife. As your labor progresses, check in with your doctor or midwife about when to come back to the hospital or birthing center. You may have special instructions if your water broke or you tested positive for group B strep. Follow-up care is a key part of your treatment and safety. Be sure to make and go to all appointments, and call your doctor if you are having problems. It's also a good idea to know your test results and keep a list of the medicines you take. How can you care for yourself at home? · Get support.  Having a support person with you from early labor until after childbirth can have a positive effect on childbirth. · Find distractions. During early labor, you can walk, play cards, watch TV, or listen to music to help take your mind off your contractions. · Ask your partner, labor , or  for a massage. Shoulder and low back massage during contractions may ease your pain. Strong massage of the back muscles (counterpressure) during contractions may help relieve the pain of back labor. Tell your labor  exactly where to push and how hard to push. · Use imagery. This means using your imagination to decrease your pain. For instance, to help manage pain, picture your contractions as waves rolling over you. Picture a peaceful place, such as a beach or mountain stream, to help you relax between contractions. · Change positions during labor. Walking, kneeling, or sitting on a big rubber ball (birth ball) are good options. · Use focused breathing techniques. Breathing in a rhythm can distract you from pain. · Take a warm shower or bath. Warm water may ease pain and stress. When should you call for help? Call 911 anytime you think you may need emergency care. For example, call if:  ? · You passed out (lost consciousness). ? · You have severe vaginal bleeding. ? · You have severe pain in your belly or pelvis. ? · You have had fluid gushing or leaking from your vagina and you know or think the umbilical cord is bulging into your vagina. If this happens, immediately get down on your knees so your rear end (buttocks) is higher than your head. This will decrease the pressure on the cord until help arrives. ?Call your doctor now or seek immediate medical care if:  ? · You have new or worse signs of preeclampsia, such as:  ¨ Sudden swelling of your face, hands, or feet. ¨ New vision problems (such as dimness or blurring). ¨ A severe headache. ? · You have any vaginal bleeding. ? · You have belly pain or cramping. ? · You have a fever.    ? · You have had regular contractions (with or without pain) for an hour. This means that you have 8 or more within 1 hour or 4 or more in 20 minutes after you change your position and drink fluids. ? · You have a sudden release of fluid from your vagina. ? · You have low back pain or pelvic pressure that does not go away. ? · You notice that your baby has stopped moving or is moving much less than normal.   ? Watch closely for changes in your health, and be sure to contact your doctor if you have any problems. Where can you learn more? Go to http://rika-chayo.info/. Enter B743 in the search box to learn more about \"Early Stage of Labor at Home: Care Instructions. \"  Current as of: March 16, 2017  Content Version: 11.4  © 0922-5567 [employer]. Care instructions adapted under license by [employer] (which disclaims liability or warranty for this information). If you have questions about a medical condition or this instruction, always ask your healthcare professional. [address] any warranty or liability for your use of this information.

## 2018-04-16 NOTE — PROGRESS NOTES
Increase in contractions today and feel stronger. Has not timed them due to being busy at work but occurring regularly since 9:30AM.   Increase in vaginal discharge and ?leaking for a few days. Good fetal movement.      Speculum exam: no pooling, cervix visually closed, nitrazine and ferning negative  Cervix: closed/long/high, mid position  Cephalic by leololds, EFW 6.5lb, FH 36    NST:   Indication: contractions  FHTs: baseline 140s, moderate variability, +accels, no decels  [place]: no contractions on toco   Time: 20 min    RTC: 1 week    Héctor Coleman MD  4/16/2018  3:23 PM

## 2018-04-16 NOTE — TELEPHONE ENCOUNTER
Patient calling , 36w6d pregnant, C/O consistent/persistent contractions since 9am this morning. PAtient has not been timing them but states they last at least 30 second if not more and are painful. She denies bleeding/ROM and reports good fetal movement. She has only drank roughly 32 ounces of water today. Spoke with nurse Raul Lee - advised to come in now.     Patient aware and on the way

## 2018-04-25 ENCOUNTER — ROUTINE PRENATAL (OUTPATIENT)
Dept: OBGYN CLINIC | Age: 23
End: 2018-04-25

## 2018-04-25 VITALS — WEIGHT: 218.8 LBS | BODY MASS INDEX: 37.56 KG/M2 | SYSTOLIC BLOOD PRESSURE: 124 MMHG | DIASTOLIC BLOOD PRESSURE: 76 MMHG

## 2018-04-25 DIAGNOSIS — Z3A.20 20 WEEKS GESTATION OF PREGNANCY: ICD-10-CM

## 2018-04-25 NOTE — PROGRESS NOTES
Doing well. Few irregular contractions. Swelling unchanged. Good FM. No LOF or VB. Cephalic presentation confirmed with US today. Cervix 1cm dilated, still long and high. RTC: 1 week, labor precautions reviewed.

## 2018-04-25 NOTE — PATIENT INSTRUCTIONS
Week 38 of Your Pregnancy: Care Instructions  Your Care Instructions    Believe it or not, your baby is almost here. You may have ideas about your baby's personality because of how much he or she moves. Or you may have noticed how he or she responds to sounds, warmth, cold, and light. You may even know what kind of music your baby likes. By now, you have a better idea of what to expect during delivery. You may have talked about your birth preferences with your doctor. But even if you want a vaginal birth, it is a good idea to learn about  births.  birth means that your baby is born through a cut (incision) in your lower belly. It is sometimes the best choice for the health of the baby and the mother. This care sheet can help you understand  births. It also gives you information about what to expect after your baby is born. And it helps you understand more about postpartum depression. Follow-up care is a key part of your treatment and safety. Be sure to make and go to all appointments, and call your doctor if you are having problems. It's also a good idea to know your test results and keep a list of the medicines you take. How can you care for yourself at home? Learn about  birth  · Most C-sections are unplanned. They are done because of problems that occur during labor. These problems might include:  ¨ Labor that slows or stops. ¨ High blood pressure or other problems for the mother. ¨ Signs of distress in the baby. These signs may include a very fast or slow heart rate. · Although most mothers and babies do well after , it is major surgery. It has more risks than a vaginal delivery. · In some cases, a planned  may be safer than a vaginal delivery. This may be the case if:  ¨ The mother has a health problem, such as a heart condition. ¨ The baby isn't in a head-down position for delivery. This is called a breech position.   ¨ The uterus has scars from past surgeries. This could increase the chance of a tear in the uterus. ¨ There is a problem with the placenta. ¨ The mother has an infection, such as genital herpes, that could be spread to the baby. ¨ The mother is having twins or more. ¨ The baby weighs 9 to 10 pounds or more. · Because of the risks of , planned C-sections generally should be done only for medical reasons. And a planned  should be done at 39 weeks or later unless there is a medical reason to do it sooner. Know what to expect after delivery, and plan for the first few weeks at home  · You, your baby, and your partner or  will get identification bands. Only people with matching bands can  the baby from the nursery. · You will learn how to feed, diaper, and bathe your baby. And you will learn how to care for the umbilical cord stump. If your baby will be circumcised, you will also learn how to care for that. · Ask people to wait to visit you until you are at home. And ask them to wash their hands before they touch your baby. · Make sure you have another adult in your home for at least 2 or 3 days after the birth. · During the first 2 weeks, limit when friends and family can visit. · Do not allow visitors who have colds or infections. Make sure all visitors are up to date with their vaccinations. Never let anyone smoke around your baby. · Try to nap when the baby naps. Be aware of postpartum depression  · \"Baby blues\" are common for the first 1 to 2 weeks after birth. You may cry or feel sad or irritable for no reason. · For some women, these feelings last longer and are more intense. This is called postpartum depression. · If your symptoms last for more than a few weeks or you feel very depressed, ask your doctor for help. · Postpartum depression can be treated. Support groups and counseling can help. Sometimes medicine can also help. Where can you learn more?   Go to http://rika-chayo.info/. Enter B044 in the search box to learn more about \"Week 38 of Your Pregnancy: Care Instructions. \"  Current as of: March 16, 2017  Content Version: 11.4  © 4391-6624 Healthwise, Incorporated. Care instructions adapted under license by [employer] (which disclaims liability or warranty for this information). If you have questions about a medical condition or this instruction, always ask your healthcare professional. [address] any warranty or liability for your use of this information.

## 2018-05-01 ENCOUNTER — ROUTINE PRENATAL (OUTPATIENT)
Dept: OBGYN CLINIC | Age: 23
End: 2018-05-01

## 2018-05-01 VITALS — BODY MASS INDEX: 37.69 KG/M2 | DIASTOLIC BLOOD PRESSURE: 66 MMHG | SYSTOLIC BLOOD PRESSURE: 112 MMHG | WEIGHT: 219.6 LBS

## 2018-05-01 DIAGNOSIS — Z3A.39 39 WEEKS GESTATION OF PREGNANCY: Primary | ICD-10-CM

## 2018-05-01 NOTE — PATIENT INSTRUCTIONS
Week 39 of Your Pregnancy: Care Instructions  Your Care Instructions    During these final weeks, you may feel anxious to see your new baby. [place] babies often look different from what you see in pictures or movies. Right after birth, their heads may have a strange shape. Their eyes may be puffy. And their genitals may be swollen. They may also have very dry skin, or red marks on the eyelids, nose, or neck. Still, most parents think their babies are beautiful. Follow-up care is a key part of your treatment and safety. Be sure to make and go to all appointments, and call your doctor if you are having problems. It's also a good idea to know your test results and keep a list of the medicines you take. How can you care for yourself at home? Prepare to breastfeed  · If you are breastfeeding, continue to eat healthy foods. · Avoid alcohol, cigarettes, and drugs. This includes prescription and over-the-counter medicines. · You can help prevent sore nipples if you feed your baby in the correct position. Nurses will help you learn to do this. · Your  will need to be fed about every 1½ to 3 hours. Choose the right birth control after your baby is born  · Women who are breastfeeding can still get pregnant. Use birth control if you don't want to get pregnant. · Intrauterine devices (IUDs) work for women who want to wait at least 2 years before getting pregnant again. They are safe to use while you are breastfeeding. · Depo-Provera can be used while you are breastfeeding. It is a shot you get every 3 months. · Birth control pills work well. But you need a different kind of pill while you are breastfeeding. And when you start taking these pills, you need to make sure to use another type of birth control until you start your second pack. · Diaphragms, cervical caps, tubal implants, and condoms with spermicide work less well after birth.  If you have a diaphragm or cervical cap, you will need to have it refitted. · Tubal ligation (tying your tubes) and vasectomy are both permanent. These are good options if you are sure you are done having children. Where can you learn more? Go to http://rika-chayo.info/. Enter Z335 in the search box to learn more about \"Week 39 of Your Pregnancy: Care Instructions. \"  Current as of: March 16, 2017  Content Version: 11.4  © 8108-5231 [employer]. Care instructions adapted under license by [employer] (which disclaims liability or warranty for this information). If you have questions about a medical condition or this instruction, always ask your healthcare professional. [address] any warranty or liability for your use of this information.

## 2018-05-04 ENCOUNTER — ROUTINE PRENATAL (OUTPATIENT)
Dept: OBGYN CLINIC | Age: 23
End: 2018-05-04

## 2018-05-04 DIAGNOSIS — Z34.03 ENCOUNTER FOR SUPERVISION OF NORMAL FIRST PREGNANCY IN THIRD TRIMESTER: Primary | ICD-10-CM

## 2018-05-04 NOTE — PATIENT INSTRUCTIONS
Week 39 of Your Pregnancy: Care Instructions  Your Care Instructions    During these final weeks, you may feel anxious to see your new baby. [place] babies often look different from what you see in pictures or movies. Right after birth, their heads may have a strange shape. Their eyes may be puffy. And their genitals may be swollen. They may also have very dry skin, or red marks on the eyelids, nose, or neck. Still, most parents think their babies are beautiful. Follow-up care is a key part of your treatment and safety. Be sure to make and go to all appointments, and call your doctor if you are having problems. It's also a good idea to know your test results and keep a list of the medicines you take. How can you care for yourself at home? Prepare to breastfeed  · If you are breastfeeding, continue to eat healthy foods. · Avoid alcohol, cigarettes, and drugs. This includes prescription and over-the-counter medicines. · You can help prevent sore nipples if you feed your baby in the correct position. Nurses will help you learn to do this. · Your  will need to be fed about every 1½ to 3 hours. Choose the right birth control after your baby is born  · Women who are breastfeeding can still get pregnant. Use birth control if you don't want to get pregnant. · Intrauterine devices (IUDs) work for women who want to wait at least 2 years before getting pregnant again. They are safe to use while you are breastfeeding. · Depo-Provera can be used while you are breastfeeding. It is a shot you get every 3 months. · Birth control pills work well. But you need a different kind of pill while you are breastfeeding. And when you start taking these pills, you need to make sure to use another type of birth control until you start your second pack. · Diaphragms, cervical caps, tubal implants, and condoms with spermicide work less well after birth.  If you have a diaphragm or cervical cap, you will need to have it refitted. · Tubal ligation (tying your tubes) and vasectomy are both permanent. These are good options if you are sure you are done having children. Where can you learn more? Go to http://rika-chayo.info/. Enter S188 in the search box to learn more about \"Week 39 of Your Pregnancy: Care Instructions. \"  Current as of: March 16, 2017  Content Version: 11.4  © 8486-1996 [employer]. Care instructions adapted under license by [employer] (which disclaims liability or warranty for this information). If you have questions about a medical condition or this instruction, always ask your healthcare professional. [address] any warranty or liability for your use of this information.

## 2018-05-04 NOTE — PROGRESS NOTES
Having more frequent contractions; precautions reviewed  NST procedure note    Niranjan Herndon is a ,  20 y.o. female [facility] whose Patient's last menstrual period was 2017 (approximate). was on  who presents for fetal non-stress test.    She is 39w3d and was monitored for 35 minutes and the FHR was reactive, with accelerations. NST Interpretation:    FHR baseline 130 bpm, variability moderate, accelerations present, decelerations Absent. Uterine contractions were present but irregular and mild    Saniya was informed of the NST results and her questions were answered.

## 2018-05-10 ENCOUNTER — TELEPHONE (OUTPATIENT)
Dept: OBGYN CLINIC | Age: 23
End: 2018-05-10

## 2018-05-10 NOTE — TELEPHONE ENCOUNTER
Patient calling , 40w2d pregnant, C/O lower back pain and is very uncomfortable. Patient wants to be induced sooner so that she doesn't have to deal with this anymore because it hurts. She states that she sent a [employer] message yesterday and has not heard anything back. I advised patient that I forwarded the message to Dr. Davida Rivero herself. Patient has an appt tomorrow and I advised the patient that she can talk about inducing at her next appt. I also encouraged the patient to use a maternity belt to relieve some of the pressure off of her lower back. She could use heat/ice and tylenol. Patient states she will try this and will discuss this at her appt tomorrow with Dr. Davida Rivero tomorrow.

## 2018-05-11 ENCOUNTER — ROUTINE PRENATAL (OUTPATIENT)
Dept: OBGYN CLINIC | Age: 23
End: 2018-05-11

## 2018-05-11 VITALS — DIASTOLIC BLOOD PRESSURE: 76 MMHG | SYSTOLIC BLOOD PRESSURE: 122 MMHG | WEIGHT: 221.4 LBS | BODY MASS INDEX: 38 KG/M2

## 2018-05-11 DIAGNOSIS — Z3A.40 40 WEEKS GESTATION OF PREGNANCY: Primary | ICD-10-CM

## 2018-05-11 NOTE — PATIENT INSTRUCTIONS
Week 40 of Your Pregnancy: Care Instructions  Your Care Instructions    By week 40, you have reached your due date. Your baby could be coming any day. But it's a good idea to think ahead to the next few weeks and what might happen. If this is your first time having a baby, try not to worry. If you don't start labor on your own by 41 or 42 weeks, your doctor may recommend giving you medicines to start labor. This care sheet gives you information about how labor can be started. It also gives you some ideas about breathing exercises you can do if you start to feel anxious or if you are trying to relax. Follow-up care is a key part of your treatment and safety. Be sure to make and go to all appointments, and call your doctor if you are having problems. It's also a good idea to know your test results and keep a list of the medicines you take. How can you care for yourself at home? Learn how labor can be started  · If you and your baby are both healthy and ready, and if your cervix has started to open, your doctor may \"break your water\" (rupture the amniotic sac). This often starts labor. · If your cervix is not quite ready, you may get a medicine called Pitocin through an IV to start contractions. · If your cervix is still very firm, you may have prostaglandin tablets (misoprostol) placed in your vagina to soften the cervix. Try guided imagery to help you relax  · Find a comfortable place to sit or lie down. Close your eyes. · Start by just taking a few deep breaths to help you relax. · Picture a setting that is calm and peaceful. This could be a beach, a mountain setting, a meadow, or a scene that you choose. · Imagine your scene, and try to add some detail. For example, is there a breeze? What does the renetta look like? Is it clear, or are there clouds? · It often helps to add a path to your scene.  For example, as you enter the meadow, imagine a path leading you through the meadow to the trees on the other side. As you follow the path farther into the [facility] you feel more and more relaxed. · When you are deep into your scene and are feeling relaxed, take a few minutes to breathe slowly and feel the calm. · When you are ready, slowly take yourself out of the scene back to the present. Tell yourself that you will feel relaxed and refreshed and will bring that sense of calm with you. · Count to 3, and open your eyes. Where can you learn more? Go to http://rika-chayo.info/. Enter K466 in the search box to learn more about \"Week 40 of Your Pregnancy: Care Instructions. \"  Current as of: March 16, 2017  Content Version: 11.4  © 9587-4417 Healthwise, Incorporated. Care instructions adapted under license by [employer] (which disclaims liability or warranty for this information). If you have questions about a medical condition or this instruction, always ask your healthcare professional. [address] any warranty or liability for your use of this information.

## 2018-05-11 NOTE — TELEPHONE ENCOUNTER
MD Glenn Werner LPN        Caller: Unspecified (Yesterday, 11:59 AM)                     Thank you, have had a busy day and didn't get a chance to get back to her yet.

## 2018-05-11 NOTE — PROGRESS NOTES
Good fetal movement. Occasional ctx. No LOF or VB. Increased back pain, pelvic and bladder pressure with occasional difficulty voiding, nausea and vomiting. Swelling remains about the same. Ready to meed this baby. Cervix 1/30/-4. Scheduled for IOL for post-dates on Monday 5/14 at 4pm (cervical ripening --> with induction Tuesday).     Yennifer Morgan MD  5/11/2018  1:15 PM

## 2018-05-13 ENCOUNTER — HOSPITAL ENCOUNTER (INPATIENT)
Age: 23
LOS: 3 days | Discharge: HOME | End: 2018-05-16
Attending: OBSTETRICS & GYNECOLOGY | Admitting: OBSTETRICS & GYNECOLOGY
Payer: COMMERCIAL

## 2018-05-13 PROBLEM — Z37.9 NORMAL LABOR: Status: ACTIVE | Noted: 2018-05-13

## 2018-05-13 LAB
ERYTHROCYTE [DISTWIDTH] IN BLOOD BY AUTOMATED COUNT: 12.9 % (ref 11.5–14.5)
HCT VFR BLD AUTO: 34.8 % (ref 35–47)
HGB BLD-MCNC: 11.8 G/DL (ref 11.5–16)
MCH RBC QN AUTO: 31.4 PG (ref 26–34)
MCHC RBC AUTO-ENTMCNC: 33.9 G/DL (ref 30–36.5)
MCV RBC AUTO: 92.6 FL (ref 80–99)
NRBC # BLD: 0 K/UL (ref 0–0.01)
NRBC BLD-RTO: 0 PER 100 WBC
PLATELET # BLD AUTO: 181 K/UL (ref 150–400)
PMV BLD AUTO: 10.4 FL (ref 8.9–12.9)
RBC # BLD AUTO: 3.76 M/UL (ref 3.8–5.2)
WBC # BLD AUTO: 16.3 K/UL (ref 3.6–11)

## 2018-05-13 PROCEDURE — 65270000029 HC RM PRIVATE

## 2018-05-13 PROCEDURE — 74011250637 HC RX REV CODE- 250/637: Performed by: ADVANCED PRACTICE MIDWIFE

## 2018-05-13 PROCEDURE — 85027 COMPLETE CBC AUTOMATED: CPT | Performed by: ADVANCED PRACTICE MIDWIFE

## 2018-05-13 PROCEDURE — 36415 COLL VENOUS BLD VENIPUNCTURE: CPT | Performed by: ADVANCED PRACTICE MIDWIFE

## 2018-05-13 PROCEDURE — 74011000258 HC RX REV CODE- 258: Performed by: ADVANCED PRACTICE MIDWIFE

## 2018-05-13 PROCEDURE — 75410000002 HC LABOR FEE PER 1 HR

## 2018-05-13 PROCEDURE — 74011250636 HC RX REV CODE- 250/636: Performed by: ADVANCED PRACTICE MIDWIFE

## 2018-05-13 RX ORDER — NALBUPHINE HYDROCHLORIDE 10 MG/ML
10 INJECTION, SOLUTION INTRAMUSCULAR; INTRAVENOUS; SUBCUTANEOUS
Status: DISCONTINUED | OUTPATIENT
Start: 2018-05-13 | End: 2018-05-16 | Stop reason: HOSPADM

## 2018-05-13 RX ORDER — SODIUM CHLORIDE, SODIUM LACTATE, POTASSIUM CHLORIDE, CALCIUM CHLORIDE 600; 310; 30; 20 MG/100ML; MG/100ML; MG/100ML; MG/100ML
125 INJECTION, SOLUTION INTRAVENOUS CONTINUOUS
Status: DISCONTINUED | OUTPATIENT
Start: 2018-05-13 | End: 2018-05-16 | Stop reason: HOSPADM

## 2018-05-13 RX ORDER — TERBUTALINE SULFATE 1 MG/ML
0.25 INJECTION SUBCUTANEOUS AS NEEDED
Status: DISCONTINUED | OUTPATIENT
Start: 2018-05-13 | End: 2018-05-14 | Stop reason: HOSPADM

## 2018-05-13 RX ORDER — FENTANYL CITRATE 50 UG/ML
100 INJECTION, SOLUTION INTRAMUSCULAR; INTRAVENOUS
Status: DISCONTINUED | OUTPATIENT
Start: 2018-05-13 | End: 2018-05-14 | Stop reason: HOSPADM

## 2018-05-13 RX ORDER — SODIUM CHLORIDE 0.9 % (FLUSH) 0.9 %
5-10 SYRINGE (ML) INJECTION EVERY 8 HOURS
Status: DISCONTINUED | OUTPATIENT
Start: 2018-05-13 | End: 2018-05-14 | Stop reason: HOSPADM

## 2018-05-13 RX ORDER — SODIUM CHLORIDE 0.9 % (FLUSH) 0.9 %
5-10 SYRINGE (ML) INJECTION AS NEEDED
Status: DISCONTINUED | OUTPATIENT
Start: 2018-05-13 | End: 2018-05-14 | Stop reason: HOSPADM

## 2018-05-13 RX ORDER — SODIUM CHLORIDE 0.9 % (FLUSH) 0.9 %
SYRINGE (ML) INJECTION
Status: DISPENSED
Start: 2018-05-13 | End: 2018-05-14

## 2018-05-13 RX ORDER — OXYTOCIN IN 5 % DEXTROSE 30/500 ML
1-25 PLASTIC BAG, INJECTION (ML) INTRAVENOUS
Status: DISCONTINUED | OUTPATIENT
Start: 2018-05-13 | End: 2018-05-14 | Stop reason: HOSPADM

## 2018-05-13 RX ADMIN — SODIUM CHLORIDE 5 MILLION UNITS: 900 INJECTION, SOLUTION INTRAVENOUS at 12:47

## 2018-05-13 RX ADMIN — PENICILLIN G POTASSIUM 2.5 MILLION UNITS: 20000000 POWDER, FOR SOLUTION INTRAVENOUS at 21:00

## 2018-05-13 RX ADMIN — PENICILLIN G POTASSIUM 2.5 MILLION UNITS: 20000000 POWDER, FOR SOLUTION INTRAVENOUS at 17:26

## 2018-05-13 RX ADMIN — Medication 2 MILLI-UNITS/MIN: at 23:12

## 2018-05-13 RX ADMIN — MISOPROSTOL 25 MCG: 100 TABLET ORAL at 19:01

## 2018-05-13 RX ADMIN — NALBUPHINE HYDROCHLORIDE 10 MG: 10 INJECTION, SOLUTION INTRAMUSCULAR; INTRAVENOUS; SUBCUTANEOUS at 23:13

## 2018-05-13 RX ADMIN — MISOPROSTOL 25 MCG: 100 TABLET ORAL at 15:19

## 2018-05-13 RX ADMIN — SODIUM CHLORIDE, SODIUM LACTATE, POTASSIUM CHLORIDE, AND CALCIUM CHLORIDE 125 ML/HR: 600; 310; 30; 20 INJECTION, SOLUTION INTRAVENOUS at 12:30

## 2018-05-13 NOTE — PROGRESS NOTES
@2137 Bedside shift change report given to ZULY Naranjo RN (oncoming nurse) by ZULY Eric RN (offgoing nurse). Report included the following information SBAR, Kardex, Intake/Output, MAR and Recent Results. @0120 Patient shaking and having lots of  Nausea/vomiting. Discussed pain management options and suggested epidural at this time. @6382 Dr. Avila Running at bedside for epidural placement    @9184 M. Lillian Mendoza student at bedside.  SVE 10/100/+2    @0639 Begin pushing    @0645  of [facility]    @0653 [facility] for 100

## 2018-05-13 NOTE — IP AVS SNAPSHOT
[address] 
[phone] Patient: Guicho Hardin MRN: [id number] :1995 A check heath indicates which time of day the medication should be taken. My Medications CONTINUE taking these medications Instructions Each Dose to Equal  
 Morning Noon Evening Bedtime COLACE 50 mg capsule Generic drug:  docusate sodium Your last dose was: Your next dose is: Take 50 mg by mouth two (2) times a day. 50 mg  
    
   
   
   
  
 FIBER CHOICE PO Your last dose was: Your next dose is: Take  by mouth. IRON (DRIED) PO Your last dose was: Your next dose is: Take  by mouth. PRENATAL DHA+COMPLETE PRENATAL -300 mg-mcg-mg Cmpk Generic drug:  [id number] aj-folic-dha Your last dose was: Your next dose is: Take  by mouth. STOP taking these medications   
 doxylamine-pyridoxine (vit B6) 10-10 mg Tbec DR tablet Commonly known as:  DICLEGIS  
   
  
 ondansetron 4 mg disintegrating tablet Commonly known as:  ZOFRAN ODT  
   
  
 promethazine 12.5 mg tablet Commonly known as:  PHENERGAN  
   
  
 VITAMIN B-6 50 mg tablet Generic drug:  pyridoxine (vitamin B6)

## 2018-05-13 NOTE — H&P
History & Physical    Name: Evin Guerrero MRN: [id number]  SSN: [id number]    YOB: 1995  Age: 20 y.o. Sex: female        Subjective: Presents with painful contractions starting @ 0600. Denies LOF, vaginal bleeding, + FM, states contractions are q 4min     19 yo G1 with EDC 18 by D=8 wk HIGINIO ultrasound.      IUP:  Anatomy scan 17 normal anatomy and growth, posterior placenta, subchorionic hemorrhage resolved, CEPHALIC presentation confirmed with US   Tdap:    Flu: done ([employer] employee)     Genetics/Carrier screening: declined     PNL: B+ / ABSC - / AA / 11.3, 269 / hiv, hepB, rub, syph, gc, chl all neg / VZV immune / UG pat / pap NILM 10/12/17 / glucola 91   -GBS: positive - PCN intrapartum     PMH/Pregnancy problems:  -N/V of pregnancy/hyperemesis requiring admission x1: resolved  -recurrent mild HAs: good hydration, tylenol prn  -h/o anxiety/depression: mood currently stable, no meds, discussed increased risk of PP depression  -rash: resolved     Delivery/PP plans:  -plans to breast feed --> class through [facility]  -desires epidural  -FEMALE \"Andreia\"     Social:  -partner \"Julián\"   -works for [employer]  Estimated Date of Delivery: 18  OB History    Para Term  AB Living   1 0 0 0 0 0   SAB TAB Ectopic Molar Multiple Live Births   0 0 0  0       # Outcome Date GA Lbr Matheus/2nd Weight Sex Delivery Anes PTL Lv   1 Current                   Ms. Marv Medel is admitted with pregnancy at 40w5d for prodromal labor. Prenatal course was normal. Please see prenatal records for details.     Past Medical History:   Diagnosis Date    Anxiety     Chlamydia 10/2016    Depression     Headache     N&V (nausea and vomiting)     Routine Papanicolaou smear 2016    Nl with +yeast (media tab)     Snoring     SOB (shortness of breath)      Past Surgical History:   Procedure Laterality Date    HX TONSILLECTOMY      HX WISDOM TEETH EXTRACTION       Social History     Occupational History    Not on file. Social History Main Topics    Smoking status: Former Smoker    Smokeless tobacco: Never Used      Comment: Never used vapor or e-cigs     Alcohol use No      Comment: Twice a week     Drug use: No    Sexual activity: Yes     Partners: Male     Birth control/ protection: None     Family History   Problem Relation Age of Onset    No Known Problems Mother     Hypertension Father     No Known Problems Sister     No Known Problems Brother     Ovarian Cancer Maternal Aunt     Cancer Maternal Uncle      Lung     No Known Problems Paternal Aunt     No Known Problems Paternal Uncle     Cancer Maternal Grandmother      Lung     No Known Problems Maternal Grandfather     Diabetes Paternal Grandmother     Cancer Paternal Grandfather      Liver (?)    Cancer Other     Mental Retardation Other      Downs' syndrome       No Known Allergies  Prior to Admission medications    Medication Sig Start Date End Date Taking? Authorizing Provider   doxylamine-pyridoxine, vit B6, (DICLEGIS) 10-10 mg TbEC DR tablet Take 2 Tabs by mouth nightly. Indications: Take 2 tablets a bedtime daily, can add 1 tablet in am and 1 tablet in pm prn 4/2/18  Yes Mathieu Iniguez NP   docusate sodium (COLACE) 50 mg capsule Take 50 mg by mouth two (2) times a day. Yes Historical Provider   FIBER CHOICE PO Take  by mouth. Yes Historical Provider   pyridoxine, vitamin B6, (VITAMIN B-6) 50 mg tablet Take  by mouth daily. Yes Historical Provider   FERROUS SULFATE, DRIED (IRON, DRIED, PO) Take  by mouth. Yes Historical Provider   promethazine (PHENERGAN) 12.5 mg tablet Take 1 Tab by mouth every six (6) hours as needed for Nausea. 10/26/17  Yes Karli Alonso MD   [id number] aj-folic-dha (PRENATAL DHA+COMPLETE PRENATAL) -761 mg-mcg-mg cmpk Take  by mouth. Yes Historical Provider   ondansetron (ZOFRAN ODT) 4 mg disintegrating tablet Take 1 Tab by mouth every eight (8) hours as needed for Nausea.  10/16/17   Guillermo Walter Stefania Toscano MD        Review of Systems: A comprehensive review of systems was negative except for that written in the HPI. Objective:     Vitals:  Vitals:    05/13/18 1147   BP: 136/80   Pulse: 85        Physical Exam:  Patient without distress. Lung: normal respiratory effort  Abdomen: soft, nontender, gravid size = dates  Perineum: blood absent, amniotic fluid absent  Cervical Exam: 1/75/-1  Membranes:  Intact  Fetal Heart Rate: Category 1    Prenatal Labs:   Lab Results   Component Value Date/Time    Rubella, External Immune 10/12/2017    GrBStrep, External Positive 04/10/2018    HBsAg, External Negative 10/12/2017    HIV, External Non Reactive 02/13/2018    RPR, External Non Reactive 02/13/2018    Gonorrhea, External Negative 10/12/2017    Chlamydia, External Negative 10/12/2017    ABO,Rh B Positive 10/12/2017         Assessment/Plan:     Active Problems:    * No active hospital problems. *       Plan: Admit for Reassuring fetal status, Continue plan for vaginal delivery, augment if needed. Group B Strep was positive, will treat prophylactically with penicillin.     GILBERT Broussard      Signed By:  Damon Bullock     May 13, 2018

## 2018-05-13 NOTE — IP AVS SNAPSHOT
[address] 
[phone] Patient: Devorah Azevedo MRN: [id number] :[id number] About your hospitalization You were admitted on:  May 13, 2018 You last received care in the:  [address] [facility] You were discharged on:  May 16, 2018 Why you were hospitalized Your primary diagnosis was:  Not on File Your diagnoses also included:  Normal Labor Follow-up Information Follow up With Details Comments Contact Info [place]   [address] [address] [facility] [phone] [facility] [id number] [phone] Lucinda Mooney MD Schedule an appointment as soon as possible for a visit in 6 weeks  [address] 
[phone] Discharge Orders None A check heath indicates which time of day the medication should be taken. My Medications CONTINUE taking these medications Instructions Each Dose to Equal  
 Morning Noon Evening Bedtime COLACE 50 mg capsule Generic drug:  docusate sodium Your last dose was: Your next dose is: Take 50 mg by mouth two (2) times a day. 50 mg  
    
   
   
   
  
 FIBER CHOICE PO Your last dose was: Your next dose is: Take  by mouth. IRON (DRIED) PO Your last dose was: Your next dose is: Take  by mouth. PRENATAL DHA+COMPLETE PRENATAL  mg-mcg-mg Cmpk Generic drug:  [id number] aj-folic-dha Your last dose was: Your next dose is: Take  by mouth. STOP taking these medications   
 doxylamine-pyridoxine (vit B6) 10-10 mg Tbec DR tablet Commonly known as:  DICLEGIS  
   
  
 ondansetron 4 mg disintegrating tablet Commonly known as:  ZOFRAN ODT  
   
  
 promethazine 12.5 mg tablet Commonly known as:  PHENERGAN  
   
  
 VITAMIN B-6 50 mg tablet Generic drug:  pyridoxine (vitamin B6) Discharge Instructions POSTPARTUM DISCHARGE INSTRUCTIONS Name:  Minna Krueger YOB: 1995 Admission Diagnosis:  Normal labor Discharge Diagnosis:   
Problem List as of 5/16/2018  Date Reviewed: 5/14/2018 Codes Class Noted - Resolved Normal labor ICD-10-CM: O80, Z37.9 ICD-9-CM: 079  5/13/2018 - Present Other constipation ICD-10-CM: K59.09 
ICD-9-CM: 564.09  11/9/2017 - Present Hyperemesis affecting pregnancy, antepartum ICD-10-CM: O21.0 ICD-9-CM: 643.03  10/3/2017 - Present Nausea and vomiting of pregnancy, antepartum ICD-10-CM: O21.9 ICD-9-CM: 643.93  9/25/2017 - Present Pregnant ICD-10-CM: Z34.90 ICD-9-CM: V22.2  9/13/2017 - Present Overview Addendum 5/11/2018  1:09 PM by Chad Mckeon MD  
  Primary Provider: Luís Thacker 21 yo G1 with EDC 5/8/18 by D=8 wk HIGINIO ultrasound. Cervical ripening on Monday 5/14 at 4pm, IOL Tues morning, scheduled with Rain 5/11 IUP:  Anatomy scan 12/18/17 normal anatomy and growth, posterior placenta, subchorionic hemorrhage resolved, CEPHALIC presentation confirmed with US 4/25 Tdap: 2/13 Flu: done [facility] employee) Genetics/Carrier screening: declined PNL: B+ / ABSC - / AA / 11.3, 269 / hiv, hepB, rub, syph, gc, chl all neg / VZV immune / UG pat / pap NILM 10/12/17 / glucola 91  
-GBS: positive - PCN intrapartum PMH/Pregnancy problems: 
-N/V of pregnancy/hyperemesis requiring admission x1: resolved 
-recurrent mild HAs: good hydration, tylenol prn 
-h/o anxiety/depression: mood currently stable, no meds, discussed increased risk of PP depression 
-rash: resolved Delivery/PP plans: 
-plans to breast feed --> class through [facility] 
-desires epidural 
-FEMALE \"Andreia\" Social: 
-partner \"Julián\"  
-works for [employer]  Migraine without aura, intractable, without status migrainosus ICD-10-CM: H00.234 
 ICD-9-CM: 346.11  5/19/2017 - Present UTI (lower urinary tract infection) ICD-10-CM: N39.0 ICD-9-CM: 599.0  10/16/2015 - Present Attending Physician:  Adilia Crawford MD 
 
Delivery Type:  Vaginal Childbirth: What To Expect At Home Your Recovery: Your body will slowly heal in the next few weeks. It is easy to get too tired and overwhelmed during the first weeks after your baby is born. Changes in your hormones can shift your mood without warning. You may find it hard to meet the extra demands on your energy and time. Take it easy on yourself. Follow-up care is a key part of your treatment and safety. Be sure to make and go to all appointments, and call your doctor if you are having problems. It's also a good idea to know your test results and keep a list of the medicines you take. How can you care for yourself at home? Vaginal bleeding and cramps · After delivery, you will have a bloody discharge from the vagina. This will turn pink within a week and then white or yellow after about 10 days. It may last for 2 to 4 weeks or longer, until the uterus has healed. Use pads instead of tampons until you stop bleeding. · Do not worry if you pass some blood clots, as long as they are smaller than a golf ball. If you have a tear or stitches in your vaginal area, change the pad at least every 4 hours to prevent soreness and infection. · You may have cramps for the first few days after childbirth. These are normal and occur as the uterus shrinks to normal size. Take an over-the-counter pain medicine, such as acetaminophen (Tylenol), ibuprofen (Advil, Motrin), or naproxen (Aleve), for cramps. Read and follow all instructions on the label. Do not take aspirin, because it can cause more bleeding. Do not take acetaminophen (Tylenol) and other acetaminophen containing medications (i.e. Percocet) at the same time. Breast fullness · Your breasts may overfill (engorge) in the first few days after delivery. To help milk flow and to relieve pain, warm your breasts in the shower or by using warm, moist towels before nursing. · If you are not nursing, do not put warmth on your breasts or touch your breasts. Wear a tight bra or sports bra and use ice until the fullness goes away. This usually takes 2 to 3 days. · Put ice or a cold pack on your breast after nursing to reduce swelling and pain. Put a thin cloth between the ice and your skin. Activity · Eat a balanced diet. Do not try to lose weight by cutting calories. Keep taking your prenatal vitamins, or take a multivitamin. · Get as much rest as you can. Try to take naps when your baby sleeps during the day. · Get some exercise every day. But do not do any heavy exercise until your doctor says it is okay. · Wait until you are healed (about 4 to 6 weeks) before you have sexual intercourse. Your doctor will tell you when it is okay to have sex. · Talk to your doctor about birth control. You can get pregnant even before your period returns. Also, you can get pregnant while you are breast-feeding. Mental Health · Many women get the \"baby blues\" during the first few days after childbirth. You may lose sleep, feel irritable, and cry easily. You may feel happy one minute and sad the next. Hormone changes are one cause of these emotional changes. Also, the demands of a new baby, along with visits from relatives or other family needs, add to a mother's stress. The \"baby blues\" often peak around the fourth day. Then they ease up in less than 2 weeks. · If your moodiness or anxiety lasts for more than 2 weeks, or if you feel like life is not worth living, you may have postpartum depression. This is different for each mother. Some mothers with serious depression may worry intensely about their infant's well-being. Others may feel distant from their child. Some mothers might even feel that they might harm their baby. A mother may have signs of paranoia, wondering if someone is watching her. · With all the changes in your life, you may not know if you are depressed. Pregnancy sometimes causes changes in how you feel that are similar to the symptoms of depression. · Symptoms of depression include: · Feeling sad or hopeless and losing interest in daily activities. These are the most common symptoms of depression. · Sleeping too much or not enough. · Feeling tired. You may feel as if you have no energy. · Eating too much or too little. · POSTPARTUM SUPPORT INTERNATIONAL (PSI) offers a Warm line; Chat with the Expert phone sessions; Information and Articles about Pregnancy and Postpartum Mood Disorders; Comprehensive List of Free Support Groups; Knowledgeable local coordinators who will offer support, information, and resources; Guide to Resources on [employer] Calendar of events in the  mood disorders community; Latest News and Research; and [facility] [address] for [employer]. Remember - You are not alone; You are not to blame; With help, you will be well. 3-000-370-PPD(8744). WWW. POSTPARTUM. NET · Writing or talking about death, such as writing suicide notes or talking about guns, knives, or pills. Keep the numbers for these national suicide hotlines: 3-806-587-TALK (6-134.861.2156) and 3-176-YPQOPVZ (4-161.434.8002). If you or someone you know talks about suicide or feeling hopeless, get help right away. Constipation and Hemorrhoids · Drink plenty of fluids, enough so that your urine is light yellow or clear like water. If you have kidney, heart, or liver disease and have to limit fluids, talk with your doctor before you increase the amount of fluids you drink. · Eat plenty of fiber each day. Have a bran muffin or bran cereal for breakfast, and try eating a piece of fruit for a mid-afternoon snack.  
· For painful, itchy hemorrhoids, put ice or a cold pack on the area several times a day for 10 minutes at a time. Follow this by putting a warm compress on the area for another 10 to 20 minutes or by sitting in a shallow, warm bath. When should you call for help? Call 911 anytime you think you may need emergency care. For example, call if: 
· You are thinking of hurting yourself, your baby, or anyone else. · You passed out (lost consciousness). · You have symptoms of a blood clot in your lung (called a pulmonary embolism). These may include:   
· Sudden chest pain. · Trouble breathing. · Coughing up blood. Call your doctor now or seek immediate medical care if: 
· You have severe vaginal bleeding. · You are soaking through a pad each hour for 2 or more hours. · Your vaginal bleeding seems to be getting heavier or is still bright red 4 days after delivery. · You are dizzy or lightheaded, or you feel like you may faint. · You are vomiting or cannot keep fluids down. · You have a fever. · You have new or more belly pain. · You pass tissue (not just blood). · Your vaginal discharge smells bad. · Your belly feels tender or full and hard. · Your breasts are continuously painful or red. · You feel sad, anxious, or hopeless for more than a few days. · You have sudden, severe pain in your belly. · You have symptoms of a blood clot in your leg (called a deep vein thrombosis),  
       such as: 
· Pain in your calf, back of the knee, thigh, or groin. · Redness and swelling in your leg or groin. · You have symptoms of preeclampsia, such as: 
· Sudden swelling of your face, hands, or feet. · New vision problems (such as dimness or blurring). · A severe headache. · Your blood pressure is higher than it should be or rises suddenly. · You have new nausea or vomiting. Watch closely for changes in your health, and be sure to contact your doctor if you have any problems. Additional Information:  Not applicable These are general instructions for a healthy lifestyle: No smoking/ No tobacco products/ Avoid exposure to second hand smoke Surgeon General's Warning:  Quitting smoking now greatly reduces serious risk to your health. Obesity, smoking, and sedentary lifestyle greatly increases your risk for illness A healthy diet, regular physical exercise & weight monitoring are important for maintaining a healthy lifestyle Recognize signs and symptoms of STROKE: 
 
F-face looks uneven A-arms unable to move or move unevenly S-speech slurred or non-existent T-time-call 911 as soon as signs and symptoms begin - DO NOT go  
    back to bed or wait to see if you get better - TIME IS BRAIN. I have had the opportunity to make my options or choices for discharge. I have received and understand these instructions. Introducing [facility]! Dear Cass Echevarria: Thank you for requesting a [employer] account. Our records indicate that you already have an active [employer] account. You can access your account anytime at [email or web address] Did you know that you can access your hospital and ER discharge instructions at any time in [employer]? You can also review all of your test results from your hospital stay or ER visit. Additional Information If you have questions, please visit the Frequently Asked Questions section of the [employer] website at [email or web address] Remember, [employer] is NOT to be used for urgent needs. For medical emergencies, dial 911. Now available from your iPhone and Android! Introducing Rafael Gallegos As a [facility] patient, I wanted to make you aware of our electronic visit tool called Rafael Gallegos. [facility] 24/7 allows you to connect within minutes with a medical provider 24 hours a day, seven days a week via a mobile device or tablet or logging into a secure website from your computer.   You can access [facility] [employer] 24/7 from anywhere in the [place]. A virtual visit might be right for you when you have a simple condition and feel like you just dont want to get out of bed, or cant get away from work for an appointment, when your regular [employer] provider is not available (evenings, weekends or holidays), or when youre out of town and need minor care. Electronic visits cost only $49 and if the [employer] 24/7 provider determines a prescription is needed to treat your condition, one can be electronically transmitted to a nearby pharmacy*. Please take a moment to enroll today if you have not already done so. The enrollment process is free and takes just a few minutes. To enroll, please download the [employer] 24/7 césar to your tablet or phone, or visit [email or web address] to enroll on your computer. And, as an [address] [place] [address] patient with a [employer] account, the results of your visits will be scanned into your electronic medical record and your primary care provider will be able to view the scanned results. We urge you to continue to see your regular [employer] provider for your ongoing medical care. And while your primary care provider may not be the one available when you seek a [employer] virtual visit, the peace of mind you get from getting a real diagnosis real time can be priceless. For more information on [employer], view our Frequently Asked Questions (FAQs) at [email or web address]. Sincerely, 
 
Favio Troy MD 
Chief Medical Officer Dina Hayley Knight *:  certain medications cannot be prescribed via [employer] Providers Seen During Your Hospitalization Provider Specialty Primary office phone Clau White MD Obstetrics & Gynecology [phone] Rosalie Youssef MD Obstetrics & Gynecology [phone] Your Primary Care Physician (PCP) Primary Care Physician Office Phone Office Fax Cinthia Looneyphil N [phone] ** None ** You are allergic to the following Allergen Reactions Motrin (Ibuprofen) Hives Itching Recent Documentation Breastfeeding? OB Status Smoking Status Unknown Recent pregnancy Former Smoker Emergency Contacts Name Discharge Info Relation Home Work Mobile Eusebio Hodge DISCHARGE CAREGIVER [3] Boyfriend [17] [phone] Sana Cantu CAREGIVER [3] Friend [5] [phone] Aurelio Lev [5] [phone] Eusebio Hodge  Friend [5] [phone] Patient Belongings The following personal items are in your possession at time of discharge: 
  Dental Appliances: None  Visual Aid: None      Home Medications: None   Jewelry: With patient  Clothing: With patient    Other Valuables: None Please provide this summary of care documentation to your next provider. Signatures-by signing, you are acknowledging that this After Visit Summary has been reviewed with you and you have received a copy. Patient Signature:  ____________________________________________________________ Date:  ____________________________________________________________  
  
Ruth Ann Judge Provider Signature:  ____________________________________________________________ Date:  ____________________________________________________________

## 2018-05-13 NOTE — PROGRESS NOTES
1134 Patient arrived in L&D for R/O labor. Positive fetal movement, no leaking of fluid or vaginal bleeding    1143 Jose Alberto Dunn CNM at bedside. SVE 1-2/75/-1    600 [facility] CNM at bedside and reviewed fhr strip. Plan is to start cytotec PO    1930 Bedside shift change report given to ZULY Candelario RN (oncoming nurse) by ZULY Renee RN (offgoing nurse). Report included the following information SBAR, Intake/Output, MAR and Recent Results.

## 2018-05-13 NOTE — PROGRESS NOTES
Labor Progress Note  Patient seen, fetal heart rate and contraction pattern evaluated, patient examined. No data found. Physical Exam:  Cervical Exam:  Not done at this time  Membranes:  Intact  Uterine Activity: Irregular 4-7min   Fetal Heart Rate: Category 1    Assessment/Plan:  Reassuring fetal status, Continue plan for vaginal delivery, Discussed IOL with patient, will begin cytotec 25mcg q 4hrs. Risks and benefits discussed. Pt verbalized understanding and consents to induction.      GILBERT Pierre

## 2018-05-14 ENCOUNTER — ANESTHESIA EVENT (OUTPATIENT)
Dept: LABOR AND DELIVERY | Age: 23
End: 2018-05-14
Payer: COMMERCIAL

## 2018-05-14 ENCOUNTER — ANESTHESIA (OUTPATIENT)
Dept: LABOR AND DELIVERY | Age: 23
End: 2018-05-14
Payer: COMMERCIAL

## 2018-05-14 PROCEDURE — 74011250636 HC RX REV CODE- 250/636: Performed by: ADVANCED PRACTICE MIDWIFE

## 2018-05-14 PROCEDURE — 77030011943

## 2018-05-14 PROCEDURE — 77030014125 HC TY EPDRL BBMI -B: Performed by: ANESTHESIOLOGY

## 2018-05-14 PROCEDURE — 74011250636 HC RX REV CODE- 250/636

## 2018-05-14 PROCEDURE — 74011000250 HC RX REV CODE- 250

## 2018-05-14 PROCEDURE — 76060000078 HC EPIDURAL ANESTHESIA

## 2018-05-14 PROCEDURE — 75410000003 HC RECOV DEL/VAG/CSECN EA 0.5 HR

## 2018-05-14 PROCEDURE — 00HU33Z INSERTION OF INFUSION DEVICE INTO SPINAL CANAL, PERCUTANEOUS APPROACH: ICD-10-PCS | Performed by: ANESTHESIOLOGY

## 2018-05-14 PROCEDURE — 75410000000 HC DELIVERY VAGINAL/SINGLE

## 2018-05-14 PROCEDURE — 3E0R3BZ INTRODUCTION OF ANESTHETIC AGENT INTO SPINAL CANAL, PERCUTANEOUS APPROACH: ICD-10-PCS | Performed by: ANESTHESIOLOGY

## 2018-05-14 PROCEDURE — 75410000002 HC LABOR FEE PER 1 HR

## 2018-05-14 PROCEDURE — 65410000002 HC RM PRIVATE OB

## 2018-05-14 PROCEDURE — 74011250637 HC RX REV CODE- 250/637: Performed by: ADVANCED PRACTICE MIDWIFE

## 2018-05-14 PROCEDURE — 0UQMXZZ REPAIR VULVA, EXTERNAL APPROACH: ICD-10-PCS | Performed by: OBSTETRICS & GYNECOLOGY

## 2018-05-14 RX ORDER — FENTANYL CITRATE 50 UG/ML
100 INJECTION, SOLUTION INTRAMUSCULAR; INTRAVENOUS ONCE
Status: DISCONTINUED | OUTPATIENT
Start: 2018-05-14 | End: 2018-05-14 | Stop reason: HOSPADM

## 2018-05-14 RX ORDER — OXYTOCIN/RINGER'S LACTATE 20/1000 ML
125-1000 PLASTIC BAG, INJECTION (ML) INTRAVENOUS AS NEEDED
Status: DISCONTINUED | OUTPATIENT
Start: 2018-05-14 | End: 2018-05-16 | Stop reason: HOSPADM

## 2018-05-14 RX ORDER — FENTANYL CITRATE 50 UG/ML
INJECTION, SOLUTION INTRAMUSCULAR; INTRAVENOUS
Status: DISPENSED
Start: 2018-05-14 | End: 2018-05-14

## 2018-05-14 RX ORDER — HYDROCORTISONE 1 %
CREAM (GRAM) TOPICAL AS NEEDED
Status: DISCONTINUED | OUTPATIENT
Start: 2018-05-14 | End: 2018-05-16 | Stop reason: HOSPADM

## 2018-05-14 RX ORDER — BUPIVACAINE HYDROCHLORIDE 5 MG/ML
30 INJECTION, SOLUTION EPIDURAL; INTRACAUDAL AS NEEDED
Status: DISCONTINUED | OUTPATIENT
Start: 2018-05-14 | End: 2018-05-14 | Stop reason: HOSPADM

## 2018-05-14 RX ORDER — SODIUM CHLORIDE 0.9 % (FLUSH) 0.9 %
5-10 SYRINGE (ML) INJECTION AS NEEDED
Status: DISCONTINUED | OUTPATIENT
Start: 2018-05-14 | End: 2018-05-16 | Stop reason: HOSPADM

## 2018-05-14 RX ORDER — BUPIVACAINE HYDROCHLORIDE 5 MG/ML
INJECTION, SOLUTION EPIDURAL; INTRACAUDAL AS NEEDED
Status: DISCONTINUED | OUTPATIENT
Start: 2018-05-14 | End: 2018-05-14 | Stop reason: HOSPADM

## 2018-05-14 RX ORDER — EPHEDRINE SULFATE 50 MG/ML
INJECTION, SOLUTION INTRAVENOUS
Status: DISPENSED
Start: 2018-05-14 | End: 2018-05-14

## 2018-05-14 RX ORDER — SODIUM CHLORIDE 0.9 % (FLUSH) 0.9 %
5-10 SYRINGE (ML) INJECTION EVERY 8 HOURS
Status: DISCONTINUED | OUTPATIENT
Start: 2018-05-14 | End: 2018-05-16 | Stop reason: HOSPADM

## 2018-05-14 RX ORDER — ACETAMINOPHEN 325 MG/1
650 TABLET ORAL
Status: DISCONTINUED | OUTPATIENT
Start: 2018-05-14 | End: 2018-05-16 | Stop reason: HOSPADM

## 2018-05-14 RX ORDER — FENTANYL/BUPIVACAINE/NS/PF 2-1250MCG
PREFILLED PUMP RESERVOIR EPIDURAL
Status: COMPLETED
Start: 2018-05-14 | End: 2018-05-14

## 2018-05-14 RX ORDER — HYDROCORTISONE ACETATE PRAMOXINE HCL 2.5; 1 G/100G; G/100G
CREAM TOPICAL AS NEEDED
Status: DISCONTINUED | OUTPATIENT
Start: 2018-05-14 | End: 2018-05-16 | Stop reason: HOSPADM

## 2018-05-14 RX ORDER — BUPIVACAINE HYDROCHLORIDE 5 MG/ML
INJECTION, SOLUTION EPIDURAL; INTRACAUDAL
Status: DISPENSED
Start: 2018-05-14 | End: 2018-05-14

## 2018-05-14 RX ORDER — FENTANYL/BUPIVACAINE/NS/PF 2-1250MCG
1-17 PREFILLED PUMP RESERVOIR EPIDURAL CONTINUOUS
Status: DISCONTINUED | OUTPATIENT
Start: 2018-05-14 | End: 2018-05-14 | Stop reason: HOSPADM

## 2018-05-14 RX ORDER — IBUPROFEN 600 MG/1
600 TABLET ORAL
Status: DISCONTINUED | OUTPATIENT
Start: 2018-05-14 | End: 2018-05-15

## 2018-05-14 RX ORDER — SIMETHICONE 80 MG
80 TABLET,CHEWABLE ORAL
Status: DISCONTINUED | OUTPATIENT
Start: 2018-05-14 | End: 2018-05-16 | Stop reason: HOSPADM

## 2018-05-14 RX ORDER — LIDOCAINE HYDROCHLORIDE AND EPINEPHRINE 15; 5 MG/ML; UG/ML
INJECTION, SOLUTION EPIDURAL AS NEEDED
Status: DISCONTINUED | OUTPATIENT
Start: 2018-05-14 | End: 2018-05-14 | Stop reason: HOSPADM

## 2018-05-14 RX ORDER — ONDANSETRON 2 MG/ML
8 INJECTION INTRAMUSCULAR; INTRAVENOUS
Status: DISCONTINUED | OUTPATIENT
Start: 2018-05-14 | End: 2018-05-16 | Stop reason: HOSPADM

## 2018-05-14 RX ORDER — NALOXONE HYDROCHLORIDE 0.4 MG/ML
0.4 INJECTION, SOLUTION INTRAMUSCULAR; INTRAVENOUS; SUBCUTANEOUS AS NEEDED
Status: DISCONTINUED | OUTPATIENT
Start: 2018-05-14 | End: 2018-05-14 | Stop reason: HOSPADM

## 2018-05-14 RX ORDER — FENTANYL/BUPIVACAINE/NS/PF 2-1250MCG
1-16 PREFILLED PUMP RESERVOIR EPIDURAL CONTINUOUS
Status: DISCONTINUED | OUTPATIENT
Start: 2018-05-14 | End: 2018-05-16 | Stop reason: HOSPADM

## 2018-05-14 RX ORDER — FENTANYL CITRATE 50 UG/ML
INJECTION, SOLUTION INTRAMUSCULAR; INTRAVENOUS AS NEEDED
Status: DISCONTINUED | OUTPATIENT
Start: 2018-05-14 | End: 2018-05-14 | Stop reason: HOSPADM

## 2018-05-14 RX ORDER — DOCUSATE SODIUM 100 MG/1
100 CAPSULE, LIQUID FILLED ORAL
Status: DISCONTINUED | OUTPATIENT
Start: 2018-05-14 | End: 2018-05-16 | Stop reason: HOSPADM

## 2018-05-14 RX ORDER — ONDANSETRON 4 MG/1
4 TABLET, ORALLY DISINTEGRATING ORAL
Status: DISCONTINUED | OUTPATIENT
Start: 2018-05-14 | End: 2018-05-16 | Stop reason: HOSPADM

## 2018-05-14 RX ORDER — AMMONIA 15 % (W/V)
1 AMPUL (EA) INHALATION AS NEEDED
Status: DISCONTINUED | OUTPATIENT
Start: 2018-05-14 | End: 2018-05-16 | Stop reason: HOSPADM

## 2018-05-14 RX ORDER — EPHEDRINE SULFATE 50 MG/ML
10 INJECTION, SOLUTION INTRAVENOUS
Status: DISCONTINUED | OUTPATIENT
Start: 2018-05-14 | End: 2018-05-14 | Stop reason: HOSPADM

## 2018-05-14 RX ADMIN — IBUPROFEN 600 MG: 600 TABLET ORAL at 08:33

## 2018-05-14 RX ADMIN — ONDANSETRON 8 MG: 2 INJECTION INTRAMUSCULAR; INTRAVENOUS at 01:37

## 2018-05-14 RX ADMIN — PENICILLIN G POTASSIUM 2.5 MILLION UNITS: 20000000 POWDER, FOR SOLUTION INTRAVENOUS at 05:17

## 2018-05-14 RX ADMIN — PENICILLIN G POTASSIUM 2.5 MILLION UNITS: 20000000 POWDER, FOR SOLUTION INTRAVENOUS at 01:27

## 2018-05-14 RX ADMIN — FENTANYL CITRATE 50 MCG: 50 INJECTION, SOLUTION INTRAMUSCULAR; INTRAVENOUS at 01:51

## 2018-05-14 RX ADMIN — SODIUM CHLORIDE, SODIUM LACTATE, POTASSIUM CHLORIDE, AND CALCIUM CHLORIDE 500 ML: 600; 310; 30; 20 INJECTION, SOLUTION INTRAVENOUS at 05:49

## 2018-05-14 RX ADMIN — LIDOCAINE HYDROCHLORIDE AND EPINEPHRINE 2 ML: 15; 5 INJECTION, SOLUTION EPIDURAL at 01:47

## 2018-05-14 RX ADMIN — Medication 10 ML/HR: at 02:02

## 2018-05-14 RX ADMIN — ONDANSETRON 8 MG: 2 INJECTION INTRAMUSCULAR; INTRAVENOUS at 08:59

## 2018-05-14 RX ADMIN — BUPIVACAINE HYDROCHLORIDE 2 ML: 5 INJECTION, SOLUTION EPIDURAL; INTRACAUDAL at 01:49

## 2018-05-14 RX ADMIN — BUPIVACAINE HYDROCHLORIDE 2 ML: 5 INJECTION, SOLUTION EPIDURAL; INTRACAUDAL at 01:50

## 2018-05-14 RX ADMIN — IBUPROFEN 600 MG: 600 TABLET ORAL at 23:10

## 2018-05-14 RX ADMIN — FENTANYL CITRATE 50 MCG: 50 INJECTION, SOLUTION INTRAMUSCULAR; INTRAVENOUS at 01:52

## 2018-05-14 RX ADMIN — IBUPROFEN 600 MG: 600 TABLET ORAL at 16:50

## 2018-05-14 RX ADMIN — LIDOCAINE HYDROCHLORIDE AND EPINEPHRINE 2 ML: 15; 5 INJECTION, SOLUTION EPIDURAL at 01:48

## 2018-05-14 NOTE — PROGRESS NOTES
Delivery Note    Provider:  GILBERT Arriola/ Rosibel Metcalf CNM    Assistant: none    Pre-Delivery Diagnosis: Term pregnancy, Induced labor or Uncomplicated pregnancy    Post-Delivery Diagnosis: Living  infant or Female    Intrapartum Event: None    Procedure: Spontaneous vaginal delivery    Epidural: YES    Monitor:  Fetal Heart Tones - External and Uterine Contractions - External    Indications for instrumental delivery: none    Estimated Blood Loss:     Episiotomy: n/a    Laceration(s):  labial    Laceration(s) repair: YES    Presentation: Cephalic    Fetal Description: aden    Fetal Position: Left Occiput Anterior    Birth Weight: pending    Birth Length: pending    Apgar - One Minute: 8    Apgar - Five Minutes: 9    Umbilical Cord: 3 vessels present    Specimens: none           Complications:  none           Cord Blood Results:   Information for the patient's :  John Beatty [[id number]]   No results found for: PCTABR, PCTDIG, BILI, ABORH    Prenatal Labs:     Lab Results   Component Value Date/Time    HBsAg, External Negative 10/12/2017    HIV, External Non Reactive 2018    Rubella, External Immune 10/12/2017    RPR, External Non Reactive 2018    Gonorrhea, External Negative 10/12/2017    Chlamydia, External Negative 10/12/2017    GrBStrep, External Positive 04/10/2018        Attending Attestation: I was present and scrubbed for the entire procedure    Pt found at +4 station, vertex visible through introitus. Pushed for 3-4 contractions, pt tolerated well. Vertex delivered OA position to ZEV, Anterior then posterior shoulder. Abdomen delivered, baby to mothers abdomen spontaneous cry at birth. Dried and stimulated on abdomen. Cord clamped X2 and cut by FOB after pulsations stopped. Left labial laceration noted and repaired with 4.0 chromic suture. Perineum intact. EBL 250ml.  Mom and baby to recovery stable condition    Signed By:  Jessica Horton     May 14, 2018

## 2018-05-14 NOTE — PROGRESS NOTES
Labor Progress Note  Patient seen, fetal heart rate and contraction pattern evaluated, patient examined. No data found.     Patient Vitals for the past 8 hrs:   Temp Pulse Resp BP   05/13/18 2303 - 88 - 126/66   05/13/18 2000 98 °F (36.7 °C) 92 16 131/76         Physical Exam:  Cervical Exam:  Deferred due to maternal discomfort  Membranes:  Intact  Uterine Activity: 2-3 minutes  Fetal Heart Rate: Category 1, Baseline 115    Assessment/Plan:  Reassuring fetal status, Continue plan for vaginal delivery, patient for epidural

## 2018-05-14 NOTE — ANESTHESIA PROCEDURE NOTES
Epidural Block    Start time: 5/14/2018 1:38 AM  End time: 5/14/2018 1:53 AM  Performed by: Sosa Thornton  Authorized by: Sosa Thornton     Pre-Procedure  Indication: labor epidural    Preanesthetic Checklist: patient identified, risks and benefits discussed, anesthesia consent, site marked, patient being monitored, timeout performed and anesthesia consent    Timeout Time: 01:38        Epidural:   Patient position:  Left lateral decubitus  Prep region:  Lumbar  Prep: Betadine    Location:  L3-4    Needle and Epidural Catheter:   Needle Type:  Tuohy  Needle Gauge:  17 G  Injection Technique:  Loss of resistance using air  Attempts:  1  Catheter Size:  19 G  Catheter at Skin Depth (cm):  11  Depth in Epidural Space (cm):  6  Events: no blood with aspiration, no cerebrospinal fluid with aspiration, no paresthesia and negative aspiration test    Test Dose:  Negative and lidocaine 1.5% w/ epi    Assessment:   Catheter Secured:  Tegaderm and tape  Insertion:  Uncomplicated  Patient tolerance:  Patient tolerated the procedure well with no immediate complications

## 2018-05-14 NOTE — ROUTINE PROCESS
5686: TRANSFER - IN REPORT:    Verbal report received from [address] [place] [address] (name) on Devorah Azevedo  being received from L&D (unit) for routine progression of care      Report consisted of patients Situation, Background, Assessment and   Recommendations(SBAR). Information from the following report(s) SBAR was reviewed with the receiving nurse. Opportunity for questions and clarification was provided. Assessment completed upon patients arrival to unit and care assumed. 1045: Pt assisted to bathroom and voided without difficulty. Pt assisted back to bed and tolerated well, denies dizziness. Fundus firm below umbilicus minus one at midline. Check void by 2541.    1250: Pt assisted to bathroom and voided without difficulty. Pt assisted back to bed and tolerated well, denies dizziness. Fundus firm below umbilicus minus one at midline. Check void complete.

## 2018-05-14 NOTE — LACTATION NOTE
This note was copied from a baby's chart. Initial Lactation Consultation - Baby born vaginally early this morning to a  mom at 36 6/7 weeks gestation. Mom has large breasts and large, fibrous, flat nipples. Baby was able to get a good seal on my finger and she was sucking vigorously. We used a latch assist to try to pull out moms nipples. We attempted to get the baby to latch directly to the breast. The baby was trying to latch but moms nipple was too large for her to get the whole thing into her mouth. We tried with the nipple shield. Baby was able to get a good seal on the shield but at that point she was sleepy and would only suck a couple times. I showed mom hand expression. We were easily able to express over 10 drops from each breast. I had mom practice hand expression and she was expressing colostrum. I recommended that mom watch the baby for feeding cues. She should attempt to feed anytime the baby is acting hungry. She may need to use the nipple shield to obtain a latch. Mom will hand express after nursing attempts and give the baby any colostrum she expresses.

## 2018-05-14 NOTE — ANESTHESIA POSTPROCEDURE EVALUATION
Post-Anesthesia Evaluation and Assessment    Patient: Guicho Hardin MRN: [id number]  SSN: [id number]    YOB: 1995  Age: 20 y.o. Sex: female       Cardiovascular Function/Vital Signs  Visit Vitals    /69 (BP 1 Location: Right arm, BP Patient Position: At rest;Sitting)    Pulse 72    Temp 36.8 °C (98.2 °F)    Resp 16    SpO2 99%    Breastfeeding Unknown       Patient is status post general, epidural anesthesia for * No procedures listed *. Nausea/Vomiting: None    Postoperative hydration reviewed and adequate. Pain:  Pain Scale 1: Numeric (0 - 10) (05/14/18 0950)  Pain Intensity 1: 3 (05/14/18 0950)   Managed    Neurological Status: At baseline    Mental Status and Level of Consciousness: Arousable    Pulmonary Status:       Adequate oxygenation and airway patent    Complications related to anesthesia: None    Post-anesthesia assessment completed.  No concerns    Signed By: Ra Lerma MD     May 14, 2018

## 2018-05-14 NOTE — PROGRESS NOTES
Labor Progress Note  Patient seen, fetal heart rate and contraction pattern evaluated, patient examined. No data found.       Physical Exam:  Cervical Exam:  4/90/-1  Membranes:  Intact  Uterine Activity: 2-3min  Fetal Heart Rate: Category 1    Assessment/Plan:  Reassuring fetal status, Continue plan for vaginal delivery, epidural for pain relief, restart pitocin for induction of labor

## 2018-05-14 NOTE — ROUTINE PROCESS
Couplet Interdisciplinary Rounds     MATERNAL    Daily Goal:     Influenza screening completed: NA   Tdap screening completed: YES   Rhogam Given:N/A  MMR Given:N/A    VTE Prophylaxis: Not indicated, per Provider order    EPDS:            Patient Name: Dinesh Echols Diagnosis: Normal labor   Date of Admission: 2018 LOS: 1  Gestational Age: Gestational Age: <None>       Daily Goal:     Birth Weight: No birth weight on file.  Current Weight:    % of Weight Change: Birth weight not on file    Feeding:  [place] Metabolic Screen: NO    Hepatitis B:  NO    Discharge Bili:  NO  Car Seat Trial, if needed:  N/A      Patient/Family Teaching Needs:     Days before discharge: Two or more days before discharge    In Attendance:  Nursing and Physician

## 2018-05-14 NOTE — PROGRESS NOTES
2620 Bedside shift change report given to ZULY Lyons RN (oncoming nurse) by ZULY Young RN (offgoing nurse). Report included the following information SBAR, Kardex, Intake/Output, MAR and Recent Results. 6417 TRANSFER - OUT REPORT:    Verbal report given to VIRIDIANA Alonzo RN(name) on [facility]  being transferred to MIU(unit) for routine progression of care       Report consisted of patients Situation, Background, Assessment and   Recommendations(SBAR). Information from the following report(s) SBAR, Kardex, Intake/Output, MAR and Recent Results was reviewed with the receiving nurse. Lines:   Peripheral IV 05/13/18 Right Arm (Active)        Opportunity for questions and clarification was provided.

## 2018-05-14 NOTE — PROGRESS NOTES
Labor Progress Note  Patient seen, fetal heart rate and contraction pattern evaluated, patient examined. Patient Vitals for the past 1 hrs:   BP Temp Pulse Resp   05/13/18 2000 131/76 98 °F (36.7 °C) 92 16       Physical Exam:  Cervical Exam:  1-2/80/-2 soft, midposition  Membranes:  Intact  Uterine Activity: 4-6 min, 50-90 seconds, palpate mild to moderate  Fetal Heart Rate: category 1    Assessment/Plan:  Reassuring fetal status, Continue plan for vaginal delivery, continue with cytotec at this time will reevaluate prior to third dose.    GILBERT Singh

## 2018-05-15 PROCEDURE — 74011250637 HC RX REV CODE- 250/637: Performed by: ADVANCED PRACTICE MIDWIFE

## 2018-05-15 PROCEDURE — 65410000002 HC RM PRIVATE OB

## 2018-05-15 RX ORDER — DIPHENHYDRAMINE HCL 25 MG
25 CAPSULE ORAL
Status: DISCONTINUED | OUTPATIENT
Start: 2018-05-15 | End: 2018-05-16 | Stop reason: HOSPADM

## 2018-05-15 RX ADMIN — IBUPROFEN 600 MG: 600 TABLET ORAL at 18:17

## 2018-05-15 RX ADMIN — HYDROCORTISONE ACETATE PRAMOXINE HCL: 2.5; 1 CREAM TOPICAL at 19:08

## 2018-05-15 RX ADMIN — ACETAMINOPHEN 650 MG: 325 TABLET, FILM COATED ORAL at 20:54

## 2018-05-15 RX ADMIN — DIPHENHYDRAMINE HYDROCHLORIDE 25 MG: 25 CAPSULE ORAL at 19:11

## 2018-05-15 RX ADMIN — ACETAMINOPHEN 650 MG: 325 TABLET, FILM COATED ORAL at 16:18

## 2018-05-15 RX ADMIN — DOCUSATE SODIUM 100 MG: 100 CAPSULE, LIQUID FILLED ORAL at 12:01

## 2018-05-15 RX ADMIN — IBUPROFEN 600 MG: 600 TABLET ORAL at 11:59

## 2018-05-15 RX ADMIN — ACETAMINOPHEN 650 MG: 325 TABLET, FILM COATED ORAL at 10:51

## 2018-05-15 RX ADMIN — IBUPROFEN 600 MG: 600 TABLET ORAL at 05:32

## 2018-05-15 RX ADMIN — ACETAMINOPHEN 650 MG: 325 TABLET, FILM COATED ORAL at 02:13

## 2018-05-15 NOTE — PROGRESS NOTES
Post-Partum Day Number 1 Progress Note    Patient doing well post-partum without significant complaint. Voiding withour difficulty, normal lochia. She was bonding with her baby as evidence by \"snuggling\" behaviors noted during time of visit. Vitals:  Patient Vitals for the past 8 hrs:   BP Temp Pulse Resp   05/15/18 0215 109/67 98.7 °F (37.1 °C) 75 16     Temp (24hrs), Av.3 °F (36.8 °C), Min:97.6 °F (36.4 °C), Max:98.7 °F (37.1 °C)      Vital signs stable, afebrile. Exam:  Patient without distress. Skin is warm and dry. Respirations are even and unlabored               Abdomen soft, fundus firm at level of umbilicus, nontender               Perineum with normal lochia noted. Lower extremities are negative for swelling, cords or tenderness. Assessment and Plan:  Patient appears to be having uncomplicated post-partum course. Continue routine perineal care and maternal education. Plan discharge tomorrow if no problems occur.

## 2018-05-15 NOTE — LACTATION NOTE
This note was copied from a baby's chart.     Couplet Interdisciplinary Rounds     MATERNAL    Daily Goal:     Influenza screening completed: NA   Tdap screening completed: YES   Rhogam Given:N/A  MMR Given:N/A    VTE Prophylaxis: Not indicated, per Provider order    EPDS:            Patient Name: Adriana Suazo Diagnosis: [place]  Single liveborn, born in hospital, delivered by vaginal delivery   Date of Admission: 2018 LOS: 1  Gestational Age: Gestational Age: 38w9d       Daily Goal:     Birth Weight: 3.46 kg Current Weight: Weight: 3.46 kg (Filed from Delivery Summary)  % of Weight Change: 0%    Feeding:   Metabolic Screen: NO    Hepatitis B:  YES    Discharge Bili:  NO  Car Seat Trial, if needed:  N/A      Patient/Family Teaching Needs:     Days before discharge: One day until discharge    In Attendance:  Nursing and Physician

## 2018-05-15 NOTE — ROUTINE PROCESS
Bedside shift change report given to Sonu Goodwin RN (oncoming nurse) by ALTA Young Cera, RN (offgoing nurse). Report included the following information SBAR, Kardex, Procedure Summary, Intake/Output, MAR and Recent Results.

## 2018-05-15 NOTE — LACTATION NOTE
This note was copied from a baby's chart. Not seen at breast, mother declines [facility] consult, expresses confidence in ability to breastfeed independently. Mother reports Baby nursing well today with use of shield,  deep latch obtained, mother is comfortable, baby feeding vigorously with rhythmic suck, swallow, breathe pattern, audible swallowing, and evident milk transfer, both breasts offered, baby is asleep following feeding. Afternoon follow up: mother states baby continues to do well. Not seen at breast, mother declines [facility] consult, expresses confidence in ability to breastfeed independently.

## 2018-05-15 NOTE — ROUTINE PROCESS
0730: OB SBAR report received by Cata Owens RN. 2645: Pt c/o itching hives to left knee, shoulder, right ear, and right wrist after taking Motrin. She states she \"felt like this yesterday but didn't know why. \" Spoke with Taras Hammans, NMW and received an order to discontinue Motrin and start Benadryl every 6 hours PRN.

## 2018-05-16 VITALS
TEMPERATURE: 97.8 F | HEART RATE: 76 BPM | RESPIRATION RATE: 14 BRPM | SYSTOLIC BLOOD PRESSURE: 110 MMHG | DIASTOLIC BLOOD PRESSURE: 70 MMHG | OXYGEN SATURATION: 99 %

## 2018-05-16 PROCEDURE — 74011250637 HC RX REV CODE- 250/637: Performed by: ADVANCED PRACTICE MIDWIFE

## 2018-05-16 RX ADMIN — ACETAMINOPHEN 650 MG: 325 TABLET, FILM COATED ORAL at 10:35

## 2018-05-16 RX ADMIN — ACETAMINOPHEN 650 MG: 325 TABLET, FILM COATED ORAL at 06:17

## 2018-05-16 RX ADMIN — DIPHENHYDRAMINE HYDROCHLORIDE 25 MG: 25 CAPSULE ORAL at 02:13

## 2018-05-16 RX ADMIN — ACETAMINOPHEN 650 MG: 325 TABLET, FILM COATED ORAL at 02:13

## 2018-05-16 NOTE — DISCHARGE SUMMARY
Obstetrical Discharge Summary     Name: Arnel Solis MRN: [id number]  SSN: [id number]    YOB: 1995  Age: 20 y.o. Sex: female      Admit Date: 2018    Discharge Date: 2018     Admitting Physician: Taylor Tucker CNM    Attending Physician:  Taylor Tucker CNM    Admission Diagnoses: Normal labor    Procedure Performed:  * No surgery found *  Surgical     Used for misc procedures    Discharge Diagnoses:   Information for the patient's :  Madiha Ponce [[id number]]   Delivery of a 7 lb 10.1 oz (3.46 kg) female infant via Vaginal, Spontaneous Delivery on 2018 at 6:45 AM  by . Apgars were 8 and 9. Birth Attended by [place]. Clive HUNT  Additional Diagnoses:   Hospital Problems  Date Reviewed: 2018          Codes Class Noted POA    Normal labor ICD-10-CM: O80, Z37.9  ICD-9-CM: 005  2018 Unknown             Lab Results   Component Value Date/Time    Rubella, External Immune 10/12/2017    GrBStrep, External Positive 04/10/2018       Hospital Course: Normal except for Allergic Rxn to Ibuprofen, resolved with PO Benedryl     Patient Disposition: Home      Followup Care:  Discharge Condition: stable  Activity as tolerated and No sex for 6 weeks  Regular Diet  None needed    Patient Instructions:   Current Discharge Medication List      CONTINUE these medications which have NOT CHANGED    Details   docusate sodium (COLACE) 50 mg capsule Take 50 mg by mouth two (2) times a day. FIBER CHOICE PO Take  by mouth. FERROUS SULFATE, DRIED (IRON, DRIED, PO) Take  by mouth.      [id number] aj-folic-dha (PRENATAL DHA+COMPLETE PRENATAL) -300 mg-mcg-mg cmpk Take  by mouth.          STOP taking these medications       doxylamine-pyridoxine, vit B6, (DICLEGIS) 10-10 mg TbEC DR tablet Comments:   Reason for Stopping:         pyridoxine, vitamin B6, (VITAMIN B-6) 50 mg tablet Comments:   Reason for Stopping:         promethazine (PHENERGAN) 12.5 mg tablet Comments:   Reason for Stopping:         ondansetron (ZOFRAN ODT) 4 mg disintegrating tablet Comments:   Reason for Stopping:               Reference my discharge instructions.     Follow-up Appointments   Procedures    FOLLOW UP VISIT Appointment in: 6 Weeks     Standing Status:   Standing     Number of Occurrences:   1     Order Specific Question:   Appointment in     Answer:   6 Weeks        Signed By:  Unique Moreno NP     May 16, 2018

## 2018-05-16 NOTE — PROGRESS NOTES
S/P , PPD#2  Ambulating and Voiding without diff  Andrea po and po meds well  Breastfeeding well established  Desires discharge home    O:  A,A&O x 3        VSS, Afebrile       Patient Vitals for the past 24 hrs:   Temp Pulse Resp BP   18 0230 97.7 °F (36.5 °C) 71 16 113/74   05/15/18 2055 97.9 °F (36.6 °C) 82 16 121/76   05/15/18 1612 97.9 °F (36.6 °C) 72 16 117/76   05/15/18 0920 98.2 °F (36.8 °C) 80 16 116/74          Breasts soft, NT       Abd soft, NT/ND       FF@ U-1, ML       Scant Lochia Rubra       Perineum Intact       Ext without REP, Neg Francia's    A/P: Routine PP care          Discharge home in stable cond. RTO 6 wks for PP exam, sooner prn         KINZA Ferraro/STEPHANIE

## 2018-05-16 NOTE — LACTATION NOTE
This note was copied from a baby's chart. Mom and baby scheduled for discharge today. Baby nursing well on the shield and has improved throughout post partum stay, deep latch maintained, mother is comfortable, milk is in transition, baby feeding vigorously with rhythmic suck, swallow, breathe pattern, with audible swallowing, and evident milk transfer, both breasts offered, baby is asleep following feeding. Baby is feeding on demand, voiding and stools present as appropriate over the last 24 hours. Baby was nursing with the shield when I went in to see mom. She nursed for 15 minutes and then came off. Moms nipple was everted. We decided to try to get the baby to latch directly to the breast. After a couple attempts baby was able to get a deep latch directly to the breast. She began sucking rhythmically with audible swallows. I encouraged mom to continue to try to feed without the shield. If baby doesn't latch within the first few minutes she should then put the shield on. She can also latch the baby with the shield and then after a few minutes of nursing she can take the baby off the breast, take off the shield and then relatch the baby directly to the breast.     We reviewed cluster feeding. Frequent feeding during this brief behavioral phase preceeding milk transition is called cluster feeding. Typical  behavior: baby becomes vigorous at the breast and wants to feed frequently- every 1-2 hours for several feedings. Emptying of the breast twice produces double in subsequent feedings. This is the normal process by which the baby demands his/her supply. This type of frequent feeding is the stimulation which causes lactogenesis II (milk coming in). Mom said the baby had been nursing about every 4 hours. I recommended that mom nurse at least 8 times in a 24 hours period. That comes to about every 3 hours. We discussed engorgement. Breasts may become engorged when milk \"comes in\".   How milk is made / normal phases of milk production, supply and demand discussed. Taught care of engorged breasts - frequent breastfeeding encouraged. Mom should put the baby to the breast and allow him to completely finish one breast before offering the second breast. She may pump a couple minutes after nursing for comfort. She can apply ice to the breasts for 10-15 minutes after nursing as needed. Pumping and returning to work/school discussed:  Start pumping for storage after first 2-3 weeks- about one hour after first AM feeding when supply is most abundant, once a day to start, timing of pumping at work/school, storage options and guidelines, and clean private pumping location (never in the bathroom). Breast feeding teaching completed and all questions answered. Feeding log filled out and given to mom.

## 2018-05-16 NOTE — ROUTINE PROCESS
Bedside shift change report given to VIRIDIANA Clayton RN (oncoming nurse) by Jocy Law RN (offgoing nurse). Report included the following information SBAR.

## 2018-05-21 ENCOUNTER — TELEPHONE (OUTPATIENT)
Dept: OBGYN CLINIC | Age: 23
End: 2018-05-21

## 2018-05-21 RX ORDER — DICLOXACILLIN SODIUM 500 MG/1
500 CAPSULE ORAL 4 TIMES DAILY
Qty: 40 CAP | Refills: 0 | Status: SHIPPED | OUTPATIENT
Start: 2018-05-21 | End: 2018-05-31

## 2018-05-21 NOTE — TELEPHONE ENCOUNTER
Call received at 2:48PM      SP patient. 20year old patient delivered her baby on 5/14/18. Patient is calling to see if she can still breast feed if feeling sick. Patient states she woke up at 6am with chills and feeling cold. Patient has not checked her temperature. Patient states her breasts are very tender to touch. Patient reports the baby is having difficulty with latching and has been receiving a bottle. Please advise.      Patient can be reached at [phone]

## 2018-05-23 ENCOUNTER — OFFICE VISIT (OUTPATIENT)
Dept: OBGYN CLINIC | Age: 23
End: 2018-05-23

## 2018-05-23 ENCOUNTER — TELEPHONE (OUTPATIENT)
Dept: OBGYN CLINIC | Age: 23
End: 2018-05-23

## 2018-05-23 VITALS
WEIGHT: 198.8 LBS | SYSTOLIC BLOOD PRESSURE: 104 MMHG | BODY MASS INDEX: 33.94 KG/M2 | HEIGHT: 64 IN | DIASTOLIC BLOOD PRESSURE: 62 MMHG | TEMPERATURE: 102 F

## 2018-05-23 DIAGNOSIS — N61.0 MASTITIS: Primary | ICD-10-CM

## 2018-05-23 RX ORDER — SULFAMETHOXAZOLE AND TRIMETHOPRIM 800; 160 MG/1; MG/1
1 TABLET ORAL 2 TIMES DAILY
Qty: 20 TAB | Refills: 0 | Status: SHIPPED | OUTPATIENT
Start: 2018-05-23 | End: 2018-06-02

## 2018-05-23 NOTE — PATIENT INSTRUCTIONS
Mastitis: Care Instructions  Your Care Instructions  Mastitis is an inflammation of the breast. It occurs most often in women who are breastfeeding, but it can affect any woman. Mastitis can be caused by poor milk flow from the breast. When milk builds up in a breast, it leaks into the nearby breast tissue. Infection can also develop when the nipples become cracked or irritated. The tissue can then become infected with bacteria. Antibiotics can usually cure mastitis. For women who are nursing, continued breastfeeding (or pumping) can help. If mastitis is not treated, a pocket of pus may form in the breast and need to be drained. Follow-up care is a key part of your treatment and safety. Be sure to make and go to all appointments, and call your doctor if you are having problems. It's also a good idea to know your test results and keep a list of the medicines you take. How can you care for yourself at home? · If your doctor prescribed antibiotics, take them as directed. Do not stop taking them just because you feel better. You need to take the full course of antibiotics. · If you are breastfeeding, continue breastfeeding or pumping breast milk. It is important to empty your breasts regularly, every 2 to 3 hours while you are awake. These tips may help:  ¨ Before breastfeeding, place a warm, wet washcloth over your breast for about 15 minutes. Try this at least 3 times a day. This increases milk flow in the breast. Massaging the affected breast may also increase milk flow. ¨ Breastfeed on both sides. Try to start with your healthy breast first. Then, after your milk is flowing, breastfeed from the affected breast until it feels soft. You should empty this breast completely. Then switch back to the healthy breast and breastfeed until your baby has finished. ¨ Pump or hand-express a small amount of breast milk before breastfeeding if your breasts are too full with milk.  This will make your breasts less full and may make it easier for your baby to latch on to your breast.  ¨ Pump or express milk from the affected breast if it hurts too much to breastfeed. · Take an over-the-counter pain medicine, such as acetaminophen (Tylenol) or ibuprofen (Advil, Motrin) to relieve pain and fever. Read and follow all instructions on the label. · Do not take two or more pain medicines at the same time unless the doctor told you to. Many pain medicines have acetaminophen, which is Tylenol. Too much acetaminophen (Tylenol) can be harmful. · Rest as much as possible. · Drink extra fluids. · If pus is draining from your infected breast, wash the nipple gently and let it air-dry before you put your bra back on. A disposable breast pad placed in the bra cup may absorb the pus. When should you call for help? Call your doctor now or seek immediate medical care if:  ? · You have worse symptoms of a breast infection, such as:  ¨ Increased pain, swelling, redness, or warmth around a breast.  ¨ Red streaks leading from a breast.  ¨ Pus draining from a breast.  ¨ A fever. ? Watch closely for changes in your health, and be sure to contact your doctor if:  ? · You do not get better as expected. Where can you learn more? Go to http://rika-chayo.info/. Enter Y207 in the search box to learn more about \"Mastitis: Care Instructions. \"  Current as of: March 16, 2017  Content Version: 11.4  © 9844-0645 [employer]. Care instructions adapted under license by [employer] (which disclaims liability or warranty for this information). If you have questions about a medical condition or this instruction, always ask your healthcare professional. [place] [address] any warranty or liability for your use of this information.

## 2018-05-23 NOTE — MR AVS SNAPSHOT
[address] [place] [address] 57 
[phone] Patient: Minna Krueger MRN: [id number] :1995 Visit Information Date & Time Provider Department Dept. Phone Encounter #  
 2018  1:40 PM [facility] AT [address] [place] [address] 90 70 Upcoming Health Maintenance Date Due  
 HPV Age 9Y-34Y (1 of 1 - Female 3 Dose Series) 2005 Influenza Age 5 to Adult 2018 PAP AKA CERVICAL CYTOLOGY 10/12/2020 Allergies as of 2018  Review Complete On: 2018 By: Rohan Powell LPN Severity Noted Reaction Type Reactions Motrin [Ibuprofen]  05/15/2018    Hives, Itching Tylenol [Acetaminophen]  2018    Rash Current Immunizations  Never Reviewed Name Date Tdap 2018 Not reviewed this visit Vitals BP Temp Height(growth percentile) Weight(growth percentile) Breastfeeding? BMI  
 104/62 (!) 102 °F (38.9 °C) (Oral) 5' 4\" (1.626 m) 198 lb 12.8 oz (90.2 kg) Yes 34.12 kg/m2 OB Status Smoking Status Recent pregnancy Former Smoker Vitals History BMI and BSA Data Body Mass Index Body Surface Area  
 34.12 kg/m 2 2.02 m 2 Preferred Pharmacy Pharmacy Name Phone [id number], [address] [place] [phone] Your Updated Medication List  
  
   
This list is accurate as of 18  2:08 PM.  Always use your most recent med list.  
  
  
  
  
 COLACE 50 mg capsule Generic drug:  docusate sodium Take 50 mg by mouth two (2) times a day. dicloxacillin 500 mg capsule Commonly known as:  Lakisha Anon Take 1 Cap by mouth four (4) times daily for 10 days. Indications: mastitis FIBER CHOICE PO Take  by mouth. IRON (DRIED) PO Take  by mouth. PRENATAL DHA+COMPLETE PRENATAL 305-300 mg-mcg-mg Cmpk Generic drug:  [id number] aj-folic-dha Take  by mouth. Patient Instructions Mastitis: Care Instructions Your Care Instructions Mastitis is an inflammation of the breast. It occurs most often in women who are breastfeeding, but it can affect any woman. Mastitis can be caused by poor milk flow from the breast. When milk builds up in a breast, it leaks into the nearby breast tissue. Infection can also develop when the nipples become cracked or irritated. The tissue can then become infected with bacteria. Antibiotics can usually cure mastitis. For women who are nursing, continued breastfeeding (or pumping) can help. If mastitis is not treated, a pocket of pus may form in the breast and need to be drained. Follow-up care is a key part of your treatment and safety. Be sure to make and go to all appointments, and call your doctor if you are having problems. It's also a good idea to know your test results and keep a list of the medicines you take. How can you care for yourself at home? · If your doctor prescribed antibiotics, take them as directed. Do not stop taking them just because you feel better. You need to take the full course of antibiotics. · If you are breastfeeding, continue breastfeeding or pumping breast milk. It is important to empty your breasts regularly, every 2 to 3 hours while you are awake. These tips may help: ¨ Before breastfeeding, place a warm, wet washcloth over your breast for about 15 minutes. Try this at least 3 times a day. This increases milk flow in the breast. Massaging the affected breast may also increase milk flow. ¨ Breastfeed on both sides. Try to start with your healthy breast first. Then, after your milk is flowing, breastfeed from the affected breast until it feels soft. You should empty this breast completely. Then switch back to the healthy breast and breastfeed until your baby has finished. ¨ Pump or hand-express a small amount of breast milk before breastfeeding if your breasts are too full with milk. This will make your breasts less full and may make it easier for your baby to latch on to your breast. 
¨ Pump or express milk from the affected breast if it hurts too much to breastfeed. · Take an over-the-counter pain medicine, such as acetaminophen (Tylenol) or ibuprofen (Advil, Motrin) to relieve pain and fever. Read and follow all instructions on the label. · Do not take two or more pain medicines at the same time unless the doctor told you to. Many pain medicines have acetaminophen, which is Tylenol. Too much acetaminophen (Tylenol) can be harmful. · Rest as much as possible. · Drink extra fluids. · If pus is draining from your infected breast, wash the nipple gently and let it air-dry before you put your bra back on. A disposable breast pad placed in the bra cup may absorb the pus. When should you call for help? Call your doctor now or seek immediate medical care if: 
? · You have worse symptoms of a breast infection, such as: 
¨ Increased pain, swelling, redness, or warmth around a breast. 
¨ Red streaks leading from a breast. 
¨ Pus draining from a breast. 
¨ A fever. ? Watch closely for changes in your health, and be sure to contact your doctor if: 
? · You do not get better as expected. Where can you learn more? Go to http://rika-chayo.info/. Enter Y207 in the search box to learn more about \"Mastitis: Care Instructions. \" Current as of: March 16, 2017 Content Version: 11.4 © 6640-3860 Only [employer]. Care instructions adapted under license by [employer] (which disclaims liability or warranty for this information). If you have questions about a medical condition or this instruction, always ask your healthcare professional. [address] any warranty or liability for your use of this information. Introducing [facility]! Dear Varsha Sandy: Thank you for requesting a [employer] account. Our records indicate that you already have an active MolecularMD account. You can access your account anytime at [email or web address] Did you know that you can access your hospital and ER discharge instructions at any time in MolecularMD? You can also review all of your test results from your hospital stay or ER visit. Additional Information If you have questions, please visit the Frequently Asked Questions section of the MolecularMD website at [email or web address] Remember, MolecularMD is NOT to be used for urgent needs. For medical emergencies, dial 911. Now available from your iPhone and Android! Please provide this summary of care documentation to your next provider. Your primary care clinician is listed as Simon Wilburn. If you have any questions after today's visit, please call [phone].

## 2018-05-23 NOTE — PROGRESS NOTES
Breast Pain Evaluation    Colt Chamberlain is a ,  20 y.o. female WHITE [place]  No LMP recorded. .    She presents with breast pain located in the bilateral breast x 2 days. Right worse than left inner aspects bilaterally  Unable to get infant to latch, only pumping breast milk    Patient is currently being treated for mastitis. Started Doxycycline 2 days ago, is on 7th dose. She is complaining of feeling ill/achy, breast discomfort and redness, and vomiting. Temperature 102 degrees in office today. Past Medical History:   Diagnosis Date    Anxiety     Chlamydia 10/2016    Depression     Headache     N&V (nausea and vomiting)     Routine Papanicolaou smear 2016    Nl with +yeast (media tab)     Snoring     SOB (shortness of breath)      Past Surgical History:   Procedure Laterality Date    HX TONSILLECTOMY      HX WISDOM TEETH EXTRACTION       Social History     Occupational History    Not on file.      Social History Main Topics    Smoking status: Former Smoker    Smokeless tobacco: Never Used      Comment: Never used vapor or e-cigs     Alcohol use No      Comment: Twice a week     Drug use: No    Sexual activity: Yes     Partners: Male     Birth control/ protection: None     Family History   Problem Relation Age of Onset    No Known Problems Mother     Hypertension Father     No Known Problems Sister     No Known Problems Brother     Ovarian Cancer Maternal Aunt     Cancer Maternal Uncle      Lung     No Known Problems Paternal Aunt     No Known Problems Paternal Uncle     Cancer Maternal Grandmother      Lung     No Known Problems Maternal Grandfather     Diabetes Paternal Grandmother     Cancer Paternal Grandfather      Liver (?)    Cancer Other     Mental Retardation Other      Downs' syndrome       Allergies   Allergen Reactions    Motrin [Ibuprofen] Hives and Itching    Tylenol [Acetaminophen] Rash     Prior to Admission medications    Medication Sig Start Date End Date Taking? Authorizing Provider   dicloxacillin (DYNAPEN) 500 mg capsule Take 1 Cap by mouth four (4) times daily for 10 days. Indications: mastitis 9/61/52 3/14/56 Yes Carmen Christian MD   docusate sodium (COLACE) 50 mg capsule Take 50 mg by mouth two (2) times a day. Yes Historical Provider   FERROUS SULFATE, DRIED (IRON, DRIED, PO) Take  by mouth. Yes Historical Provider   [id number] aj-folic-dha (PRENATAL DHA+COMPLETE PRENATAL) [id number] mg-mcg-mg cmpk Take  by mouth. Yes Historical Provider   FIBER CHOICE PO Take  by mouth.     Historical Provider        Review of Systems: History obtained from the patient  Constitutional: negative for weight loss, fever, night sweats  HEENT: negative for hearing loss, earache, congestion, snoring, sorethroat  CV: negative for chest pain, palpitations, edema  Resp: negative for cough, shortness of breath, wheezing  Breast: see above  GI: negative for change in bowel habits, abdominal pain, black or bloody stools  : negative for frequency, dysuria, hematuria, vaginal discharge  MSK: negative for back pain, joint pain, muscle pain  Skin: negative for itching, rash, hives  Neuro: negative for dizziness, headache, confusion, weakness  Psych: negative for anxiety, depression, change in mood  Heme/lymph: negative for bleeding, bruising, pallor    Objective:  Visit Vitals    /62    Temp (!) 102 °F (38.9 °C) (Oral)    Ht 5' 4\" (1.626 m)    Wt 198 lb 12.8 oz (90.2 kg)    Breastfeeding Yes    BMI 34.12 kg/m2     Exam:  Constitutional  · Appearance: well-nourished, well developed, alert, in no acute distress  · Appears sick    HENT  · Head and Face: appears normal    Neck  · Inspection/Palpation: normal appearance, no masses or tenderness  · Lymph Nodes: no lymphadenopathy present  · Thyroid: gland size normal, nontender, no nodules or masses present on palpation    Breasts  · Inspection of Breasts: breasts symmetrical, engorged, erythematous and warm to touch. Normal breast milk leaking bilat., nipple appearance normal, no skin retraction present  · Palpation of Breasts and Axillae: no masses present on palpation, significant breast tenderness bilat  · Axillary Lymph Nodes: no lymphadenopathy present    Assessment:  Bilat mastitis  Currently on Antibiotic treatment    Plan:  Add Bactrim DS BID x 10 days to current treatment, suspect MRSA  Pt reports now thinks rxn in hospital was to Tylenol instead of Motrin as she took Tylenol in pregnancy and similar rash  Has never had issues with Motrin in past  Advised trial of Motrin with family or friend close and dose of Benadryl handy if needed for Temp resolution  If symptoms worsen over wkend need for pt to go to ED on Sat/Sun  F/U here on Tuesday for recheck  Obtain Probiotic and begin today  Pt states \"if I want to just stop breastfeeding what do I need to do\"  Advised Cold cabbage leaves inside of tight fitting bra and wrap breasts with tight Ace bandage for 48 hrs, no stimulation or expression of breast milk. Connor Nuñez.  KINZA Taylor/STEPHANIE

## 2018-05-29 ENCOUNTER — OFFICE VISIT (OUTPATIENT)
Dept: OBGYN CLINIC | Age: 23
End: 2018-05-29

## 2018-05-29 VITALS
BODY MASS INDEX: 33.63 KG/M2 | HEIGHT: 64 IN | SYSTOLIC BLOOD PRESSURE: 118 MMHG | DIASTOLIC BLOOD PRESSURE: 80 MMHG | WEIGHT: 197 LBS

## 2018-05-29 NOTE — PROGRESS NOTES
Chief Complaint   Here for evaluation of current mastitis treatment   \"I feel 100% better\"    HPI  Atilio Lo is a 20 y.o. female who presents for mastitis follow up. No LMP recorded. Is now bottle feeding. Did trial of Motrin with no allergic reaction symptoms. Past Medical History:   Diagnosis Date    Anxiety     Chlamydia 10/2016    Depression     Headache     N&V (nausea and vomiting)     Routine Papanicolaou smear 02/09/2016    Nl with +yeast (media tab)     Snoring     SOB (shortness of breath)      Past Surgical History:   Procedure Laterality Date    HX TONSILLECTOMY      HX WISDOM TEETH EXTRACTION       Social History     Occupational History    Not on file. Social History Main Topics    Smoking status: Former Smoker    Smokeless tobacco: Never Used      Comment: Never used vapor or e-cigs     Alcohol use No      Comment: Twice a week     Drug use: No    Sexual activity: Yes     Partners: Male     Birth control/ protection: None     Family History   Problem Relation Age of Onset    No Known Problems Mother     Hypertension Father     No Known Problems Sister     No Known Problems Brother     Ovarian Cancer Maternal Aunt     Cancer Maternal Uncle      Lung     No Known Problems Paternal Aunt     No Known Problems Paternal Uncle     Cancer Maternal Grandmother      Lung     No Known Problems Maternal Grandfather     Diabetes Paternal Grandmother     Cancer Paternal Grandfather      Liver (?)    Cancer Other     Mental Retardation Other      Downs' syndrome       Allergies   Allergen Reactions    Motrin [Ibuprofen] Hives and Itching    Tylenol [Acetaminophen] Rash     Prior to Admission medications    Medication Sig Start Date End Date Taking? Authorizing Provider   trimethoprim-sulfamethoxazole (BACTRIM DS, SEPTRA DS) 160-800 mg per tablet Take 1 Tab by mouth two (2) times a day for 10 days.  Indications: Skin and Skin Structure Infection 5/23/18 6/2/18  Master Carter Flores Tyson NP   dicloxacillin (DYNAPEN) 500 mg capsule Take 1 Cap by mouth four (4) times daily for 10 days. Indications: mastitis 4/45/95 4/49/19  Rosalie Youssef MD   docusate sodium (COLACE) 50 mg capsule Take 50 mg by mouth two (2) times a day. Historical Provider   FIBER CHOICE PO Take  by mouth. Historical Provider   FERROUS SULFATE, DRIED (IRON, DRIED, PO) Take  by mouth. Historical Provider   [id number] aj-folic-dha (PRENATAL DHA+COMPLETE PRENATAL) [id number] mg-mcg-mg cmpk Take  by mouth. Historical Provider        Review of Systems: History obtained from the patient  Constitutional: negative for weight loss, fever, night sweats  Breast: negative for breast lumps, nipple discharge, galactorrhea  GI: negative for change in bowel habits, abdominal pain, black or bloody stools  : negative for frequency, dysuria, hematuria, vaginal discharge  MSK: negative for back pain, joint pain, muscle pain  Skin: negative for itching, rash, hives  Psych: negative for anxiety, depression, change in mood      Objective:  Visit Vitals     5' 4\" (1.626 m)       Physical Exam:   PHYSICAL EXAMINATION    Constitutional  · Appearance: well-nourished, well developed, alert, in no acute distress    Breast:  Resolving mastitis. No redness,  Nipple complaints or concerns. Genitourinary  · Deferred. Sitting comfortably in jeans. Skin  · General Inspection: no rash, no lesions identified    Neurologic/Psychiatric  · Mental Status:  · Orientation: grossly oriented to person, place and time  · Mood and Affect: mood normal, affect appropriate    Assessment:     Finish ABX as prescribed    Plan: return for 6 week check up  Briefly discussed OCP use at that time        RTO prn if symptoms persist or worsen. Instructions given to pt. Handouts given to pt.

## 2018-06-15 ENCOUNTER — TELEPHONE (OUTPATIENT)
Dept: OBGYN CLINIC | Age: 23
End: 2018-06-15

## 2018-06-15 NOTE — TELEPHONE ENCOUNTER
Harry Young MD   You 5 minutes ago (2:56 PM)                 No restrictions (Routing comment)                        Harry Young MD   You 6 minutes ago (2:54 PM)                   If she is feeling well enough and is eager to return to work, she may return. Otherwise we are happy to write work note and will clear her at her 6 week postpartum visit.  Thanks (Routing comment)

## 2018-06-15 NOTE — LETTER
6/15/2018 3:45 PM 
 
 
Ms. Mahad Daniels [address] [address] To Whom it may concern; 
 
     Mahad Daniels was under my care for her recent pregnancy  She will be able to return to work as of June 18, 2018, at which time she will be capable of working with no restrictions. Please have the patient contact our office should you have any questions. Sincerely, Héctor Coleman MD

## 2018-06-15 NOTE — TELEPHONE ENCOUNTER
Call received at 2:21PM        20year old patient delivered her baby on 5/14/18. Patient was seen in the office on 5/29/18 postpartum visit. Patient calling to ask about returning to work. 5/29/18 office note    Assessment:      Finish ABX as prescribed     Plan: return for 6 week check up  Briefly discussed OCP use at that time           RTO prn if symptoms persist or worsen. Instructions given to pt. Handouts given to pt.         ? Sunshine Rajan to write note for patient to return to work with out restrictions.       Please advise

## 2018-06-15 NOTE — TELEPHONE ENCOUNTER
Patient called back and made aware of MD recommendations. She states she is feeling well and would like to go back to work on Monday 6/18/18. Post partum appt scheduled 6/26/18. Letter composed and faxed to [phone] Attn: Robles Quintero. Confirmation received.

## 2018-06-15 NOTE — TELEPHONE ENCOUNTER
This nurse attempted to reach the patient and left a detailed message for the patient to call the office back

## 2018-06-29 ENCOUNTER — OFFICE VISIT (OUTPATIENT)
Dept: OBGYN CLINIC | Age: 23
End: 2018-06-29

## 2018-06-29 VITALS
WEIGHT: 198.6 LBS | HEIGHT: 64 IN | SYSTOLIC BLOOD PRESSURE: 110 MMHG | BODY MASS INDEX: 33.9 KG/M2 | DIASTOLIC BLOOD PRESSURE: 70 MMHG

## 2018-06-29 RX ORDER — NORGESTIMATE AND ETHINYL ESTRADIOL 0.25-0.035
1 KIT ORAL DAILY
Qty: 3 PACKAGE | Refills: 4 | Status: SHIPPED | OUTPATIENT
Start: 2018-06-29 | End: 2019-01-04 | Stop reason: SDUPTHER

## 2018-06-29 NOTE — PATIENT INSTRUCTIONS
After Your Delivery (the Postpartum Period): Care Instructions  Your Care Instructions    Congratulations on the birth of your baby. Like pregnancy, the  period can be a time of excitement, fracisco, and exhaustion. You may look at your wondrous little baby and feel happy. You may also be overwhelmed by your new sleep hours and new responsibilities. At first, babies often sleep during the days and are awake at night. They do not have a pattern or routine. They may make sudden gasps, jerk themselves awake, or look like they have crossed eyes. These are all normal, and they may even make you smile. In these first weeks after delivery, try to take good care of yourself. It may take 4 to 6 weeks to feel like yourself again, and possibly longer if you had a  birth. You will likely feel very tired for several weeks. Your days will be full of ups and downs, but lots of fracisco as well. Follow-up care is a key part of your treatment and safety. Be sure to make and go to all appointments, and call your doctor if you are having problems. It's also a good idea to know your test results and keep a list of the medicines you take. How can you care for yourself at home? Take care of your body after delivery  · Use pads instead of tampons for the bloody flow that may last as long as 2 weeks. · Ease cramps with ibuprofen (Advil, Motrin). · Ease soreness of hemorrhoids and the area between your vagina and rectum with ice compresses or witch hazel pads. · Ease constipation by drinking lots of fluid and eating high-fiber foods. Ask your doctor about over-the-counter stool softeners. · Cleanse yourself with a gentle squeeze of warm water from a bottle instead of wiping with toilet paper. · Take a sitz bath in warm water several times a day. · Wear a good nursing bra. Ease sore and swollen breasts with warm, wet washcloths. · If you are not breastfeeding, use ice rather than heat for breast soreness.   · Your period may not start for several months if you are breastfeeding. You may bleed more, and longer at first, than you did before you got pregnant. · Wait until you are healed (about 4 to 6 weeks) before you have sexual intercourse. Your doctor will tell you when it is okay to have sex. · Try not to travel with your baby for 5 or 6 weeks. If you take a long car trip, make frequent stops to walk around and stretch. Avoid exhaustion  · Rest every day. Try to nap when your baby naps. · Ask another adult to be with you for a few days after delivery. · Plan for  if you have other children. · Stay flexible so you can eat at odd hours and sleep when you need to. Both you and your baby are making new schedules. · Plan small trips to get out of the house. Change can make you feel less tired. · Ask for help with housework, cooking, and shopping. Remind yourself that your job is to care for your baby. Know about help for postpartum depression  · \"Baby blues\" are common for the first 1 to 2 weeks after birth. You may cry or feel sad or irritable for no reason. · Rest whenever you can. Being tired makes it harder to handle your emotions. · Go for walks with your baby. · Talk to your partner, friends, and family about your feelings. · If your symptoms last for more than a few weeks, or if you feel very depressed, ask your doctor for help. · Postpartum depression can be treated. Support groups and counseling can help. Sometimes medicine can also help. Stay healthy  · Eat healthy foods so you have more energy, make good breast milk, and lose extra baby pounds. · If you breastfeed, avoid alcohol and drugs. Stay smoke-free. If you quit during pregnancy, congratulations. · Start daily exercise after 4 to 6 weeks, but rest when you feel tired. · Learn exercises to tone your belly. Do Kegel exercises to regain strength in your pelvic muscles. You can do these exercises while you stand or sit.   ¨ Squeeze the same muscles you would use to stop your urine. Your belly and thighs should not move. ¨ Hold the squeeze for 3 seconds, and then relax for 3 seconds. ¨ Start with 3 seconds. Then add 1 second each week until you are able to squeeze for 10 seconds. ¨ Repeat the exercise 10 to 15 times for each session. Do three or more sessions each day. · Find a class for new mothers and new babies that has an exercise time. · If you had a  birth, give yourself a bit more time before you exercise, and be careful. When should you call for help? Call 911 anytime you think you may need emergency care. For example, call if:  ? · You passed out (lost consciousness). ?Call your doctor now or seek immediate medical care if:  ? · You have severe vaginal bleeding. This means you are passing blood clots and soaking through a pad each hour for 2 or more hours. ? · You are dizzy or lightheaded, or you feel like you may faint. ? · You have a fever. ? · You have new belly pain, or your pain gets worse. ? Watch closely for changes in your health, and be sure to contact your doctor if:  ? · Your vaginal bleeding seems to be getting heavier. ? · You have new or worse vaginal discharge. ? · You feel sad, anxious, or hopeless for more than a few days. ? · You do not get better as expected. Where can you learn more? Go to http://rika-chayo.info/. Enter A461 in the search box to learn more about \"After Your Delivery (the Postpartum Period): Care Instructions. \"  Current as of: 2017  Content Version: 11.4  © 3695-0536 [employer]. Care instructions adapted under license by [employer] (which disclaims liability or warranty for this information). If you have questions about a medical condition or this instruction, always ask your healthcare professional. [address] any warranty or liability for your use of this information.

## 2018-06-29 NOTE — PROGRESS NOTES
Postpartum evaluation    Tamy Oconnell is a 20 y.o. female who presents for a postpartum exam.     Delivery of a 7 lb 10.1 oz (3.46 kg) female infant via Vaginal, Spontaneous Delivery on 5/14/2018. Her baby is doing well. She has had a cycle since delivery. lmp 6/8    She has had the following significant problems since her delivery: mastitis    The patient is bottle feeding without difficulty. The patient would like to discuss birth control options. She is currently taking: no medications. She is due for her next AE in 6-12months. Visit Vitals    /70    Ht 5' 4\" (1.626 m)    Wt 198 lb 9.6 oz (90.1 kg)    LMP 06/08/2018 (Approximate)    Breastfeeding No  Comment: Bottle    BMI 34.09 kg/m2       PHYSICAL EXAMINATION    Constitutional  · Appearance: well-nourished, well developed, alert, in no acute distress    HENT  · Head and Face: appears normal      Gastrointestinal  · Abdominal Examination: abdomen non-tender to palpation, normal bowel sounds, no masses present  · Liver and spleen: no hepatomegaly present, spleen not palpable  · Hernias: no hernias identified      Skin  · General Inspection: no rash, no lesions identified    Neurologic/Psychiatric  · Mental Status:  · Orientation: grossly oriented to person, place and time  · Mood and Affect: mood normal, affect appropriate  · NO overt evidence of PPD voiced or observed    Assessment:  Normal postpartum check    Plan:  RTO for AE.   Rx for contraception: sprintec

## 2018-09-12 ENCOUNTER — TELEPHONE (OUTPATIENT)
Dept: OBGYN CLINIC | Age: 23
End: 2018-09-12

## 2018-09-12 NOTE — TELEPHONE ENCOUNTER
STEPHANIE Amezquita LPN        Caller: Unspecified (Today, 11:31 AM)                  Can have the nortrel   Thanks   otoniel

## 2018-09-12 NOTE — TELEPHONE ENCOUNTER
[facility] pharmacy calling stating that her insurance will no longer cover the Sprintec OCPs. Her insurance will cover Nortrel 0.5/35, Charla Cleary and Alycen 1/35. Please advise.

## 2018-09-13 ENCOUNTER — TELEPHONE (OUTPATIENT)
Dept: OBGYN CLINIC | Age: 23
End: 2018-09-13

## 2018-09-13 NOTE — TELEPHONE ENCOUNTER
Nelson Fairchild LPN        Caller: Unspecified (Today,  9:31 AM)                     Patient needs to give [facility] her contraceptive prescription card from [facility].

## 2018-09-13 NOTE — TELEPHONE ENCOUNTER
Patient called stating that she was advised by the pharmacy to give our office a call regarding her OCP rx and not being covered. I explained that her insurance requires certain brands and a new rx was sent yesterday. Patient requested I contact the pharmacy. Spoke with Pharmacist at [facility]'s need a Prior Auth. Will forward to the Prior Auth Representative for completion.

## 2018-09-13 NOTE — TELEPHONE ENCOUNTER
Patient aware that she needs to give the [facility] her contraception card. Patient states she has given the card to them several times. Patient advised to contact [facility] and see about the card. Spoke with the pharmacist - made them aware - pharmacist states that they do not have the contraceptive card on file for her. Pharmacist state that [facility] will reach out to the patient.

## 2019-01-04 ENCOUNTER — OFFICE VISIT (OUTPATIENT)
Dept: OBGYN CLINIC | Age: 24
End: 2019-01-04

## 2019-01-04 VITALS
DIASTOLIC BLOOD PRESSURE: 68 MMHG | BODY MASS INDEX: 34.25 KG/M2 | WEIGHT: 200.6 LBS | HEIGHT: 64 IN | SYSTOLIC BLOOD PRESSURE: 112 MMHG

## 2019-01-04 DIAGNOSIS — Z01.419 ENCOUNTER FOR GYNECOLOGICAL EXAMINATION WITHOUT ABNORMAL FINDING: Primary | ICD-10-CM

## 2019-01-04 PROBLEM — Z37.9 NORMAL LABOR: Status: RESOLVED | Noted: 2018-05-13 | Resolved: 2019-01-04

## 2019-01-04 PROBLEM — Z34.90 PREGNANT: Status: RESOLVED | Noted: 2017-09-13 | Resolved: 2019-01-04

## 2019-01-04 PROBLEM — O21.9 NAUSEA AND VOMITING OF PREGNANCY, ANTEPARTUM: Status: RESOLVED | Noted: 2017-09-25 | Resolved: 2019-01-04

## 2019-01-04 PROBLEM — O21.0 HYPEREMESIS AFFECTING PREGNANCY, ANTEPARTUM: Status: RESOLVED | Noted: 2017-10-03 | Resolved: 2019-01-04

## 2019-01-04 RX ORDER — NORGESTIMATE AND ETHINYL ESTRADIOL 0.25-0.035
1 KIT ORAL DAILY
Qty: 3 PACKAGE | Refills: 4 | Status: SHIPPED | OUTPATIENT
Start: 2019-01-04 | End: 2019-02-01

## 2019-01-04 NOTE — PROGRESS NOTES
Annual exam 
 
Brooke Wood is a 24 y.o.  A0 presenting for annual exam. Doing well. Good menstrual control on Sprintec OCPs. Baby girl \"Andreia\" is now 10 months old and doing well. Pt is back to work, baby in . Pt denies any gynecologic problems or concerns. Ob/Gyn Hx: 
 A0 - 1 vaginal delivery LMP- 2 weeks ago Menses- regular monthly cycles? yes, last 4 days, passage of clots? no, intermenstrual spotting? no, Number of pads/tampons per day? 3 Contraception- pills (sprintec) STI- chlamydia ? SA- yes Health maintenance: 
Pap- 10/12/17 NILM Gardasil- unsure if completed Past Medical History:  
Diagnosis Date  Anxiety  Chlamydia 10/2016  Depression  Headache  N&V (nausea and vomiting)  Routine Papanicolaou smear 2016 Nl with +yeast (media tab)  Snoring  SOB (shortness of breath) Past Surgical History:  
Procedure Laterality Date  HX TONSILLECTOMY  HX WISDOM TEETH EXTRACTION Family History Problem Relation Age of Onset  No Known Problems Mother  Hypertension Father  No Known Problems Sister  No Known Problems Brother  Ovarian Cancer Maternal Aunt  Cancer Maternal Uncle Lung  No Known Problems Paternal Aunt  No Known Problems Paternal Uncle  Cancer Maternal Grandmother Lung  No Known Problems Maternal Grandfather  Diabetes Paternal Grandmother  Cancer Paternal Grandfather Liver (?)  Cancer Other  Mental Retardation Other Agnieszka Dines' syndrome Social History Socioeconomic History  Marital status: SINGLE Spouse name: Not on file  Number of children: Not on file  Years of education: Not on file  Highest education level: Not on file Social Needs  Financial resource strain: Not on file  Food insecurity - worry: Not on file  Food insecurity - inability: Not on file  Transportation needs - medical: Not on file  Transportation needs - non-medical: Not on file Occupational History  Not on file Tobacco Use  Smoking status: Former Smoker  Smokeless tobacco: Never Used  Tobacco comment: Never used vapor or e-cigs Substance and Sexual Activity  Alcohol use: No  
  Alcohol/week: 3.0 oz Types: 5 Cans of beer per week Comment: Twice a week  Drug use: No  
 Sexual activity: Yes  
  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  
 
 
Current Outpatient Medications Medication Sig Dispense Refill  norethindrone-ethinyl estradiol (NORTREL 0.5/35, 28,) 0.5-35 mg-mcg tab Take 1 Tab by mouth daily. 3 Package 2  
 norgestimate-ethinyl estradiol (ORTHO-CYCLEN, SPRINTEC) 0.25-35 mg-mcg tab Take 1 Tab by mouth daily. 3 Package 4  
 docusate sodium (COLACE) 50 mg capsule Take 50 mg by mouth two (2) times a day.  FIBER CHOICE PO Take  by mouth.  FERROUS SULFATE, DRIED (IRON, DRIED, PO) Take  by mouth.  [id number] aj-folic-dha (PRENATAL DHA+COMPLETE PRENATAL) -300 mg-mcg-mg cmpk Take  by mouth. Allergies Allergen Reactions  Motrin [Ibuprofen] Hives and Itching  Tylenol [Acetaminophen] Rash Review of Systems - History obtained from the patient Constitutional: negative for weight loss, fever, night sweats HEENT: negative for hearing loss, earache, congestion, snoring, sorethroat CV: negative for chest pain, palpitations, edema Resp: negative for cough, shortness of breath, wheezing GI: negative for change in bowel habits, abdominal pain, black or bloody stools : negative for frequency, dysuria, hematuria, vaginal discharge MSK: negative for back pain, joint pain, muscle pain Breast: negative for breast lumps, nipple discharge, galactorrhea Skin :negative for itching, rash, hives Neuro: negative for dizziness, headache, confusion, weakness Psych: negative for anxiety, depression, change in mood Heme/lymph: negative for bleeding, bruising, pallor Physical Exam 
Visit Vitals /68 (BP 1 Location: Left arm, BP Patient Position: Sitting) Ht 5' 4\" (1.626 m) Wt 200 lb 9.6 oz (91 kg) Breastfeeding? Unknown BMI 34.43 kg/m² Constitutional 
· Appearance: well-nourished, well developed, alert, in no acute distress HENT 
· Head and Face: appears normal 
 
Neck · Inspection/Palpation: normal appearance, no masses or tenderness · Lymph Nodes: no lymphadenopathy present · Thyroid: gland size normal, nontender, no nodules or masses present on palpation Chest 
· Respiratory Effort: non-labored breathing · Auscultation: CTAB, normal breath sounds Cardiovascular · Heart: 
· Auscultation: regular rate and rhythm without murmur · Extremities: no peripheral edema Breasts · Inspection of Breasts: breasts symmetrical, no skin changes, no discharge present, nipple appearance normal, no skin retraction present · Palpation of Breasts and Axillae: no masses present on palpation, no breast tenderness · Axillary Lymph Nodes: no lymphadenopathy present Gastrointestinal 
· Abdominal Examination: abdomen non-tender to palpation, normal bowel sounds, no masses present · Liver and spleen: no hepatomegaly present, spleen not palpable · Hernias: no hernias identified Genitourinary · External Genitalia: normal appearance for age, no discharge present, no tenderness present, no inflammatory lesions present, no masses present, no atrophy present · Vagina: normal vaginal vault without central or paravaginal defects, no discharge present, no inflammatory lesions present, no masses present · Bladder: non-tender to palpation · Urethra: appears normal 
· Cervix: normal  
· Uterus: normal size, shape and consistency · Adnexa: no adnexal tenderness present, no adnexal masses present · Perineum: perineum within normal limits, no evidence of trauma, no rashes or skin lesions present Skin · General Inspection: no rash, no lesions identified Neurologic/Psychiatric · Mental Status: · Orientation: grossly oriented to person, place and time · Mood and Affect: mood normal, affect appropriate Assessment/Plan: 
24 y.o. Toron[place]ky Dotya presenting for annual exam. Overall doing well. Health Maintenance: 
-counseled re: diet, exercise, healthy lifestyle, weight loss 
-pap next 2020 
-declines STI testing 
-Gardasil - pt will find out if she has been vaccinated 
-refill sprintec OCPs RTC: 1 year for AE Gilma Holloway MD  1/4/2019  1:51 PM

## 2019-01-04 NOTE — PATIENT INSTRUCTIONS

## 2020-01-29 RX ORDER — NORGESTIMATE AND ETHINYL ESTRADIOL 0.25-0.035
1 KIT ORAL DAILY
Qty: 1 PACKAGE | Refills: 12 | Status: SHIPPED | OUTPATIENT
Start: 2020-01-29

## 2021-09-10 NOTE — DISCHARGE INSTRUCTIONS
POSTPARTUM DISCHARGE INSTRUCTIONS       Name:  Danna Kenny  YOB: 1995  Admission Diagnosis:  Normal labor     Discharge Diagnosis:    Problem List as of 5/16/2018  Date Reviewed: 5/14/2018          Codes Class Noted - Resolved    Normal labor ICD-10-CM: O80, Z37.9  ICD-9-CM: 605  5/13/2018 - Present        Other constipation ICD-10-CM: K59.09  ICD-9-CM: 564.09  11/9/2017 - Present        Hyperemesis affecting pregnancy, antepartum ICD-10-CM: O21.0  ICD-9-CM: 643.03  10/3/2017 - Present        Nausea and vomiting of pregnancy, antepartum ICD-10-CM: O21.9  ICD-9-CM: 643.93  9/25/2017 - Present        Pregnant ICD-10-CM: Z34.90  ICD-9-CM: V22.2  9/13/2017 - Present    Overview Addendum 5/11/2018  1:09 PM by Ryan Ch MD     Primary Provider: Kalyn Gomez    19 yo G1 with EDC 5/8/18 by D=8 wk HIGINIO ultrasound.  Cervical ripening on Monday 5/14 at 4pm, IOL Tues morning, scheduled with Rain 5/11    IUP:  Anatomy scan 12/18/17 normal anatomy and growth, posterior placenta, subchorionic hemorrhage resolved, CEPHALIC presentation confirmed with US 4/25  Tdap: 2/13   Flu: done ([employer] employee)    Genetics/Carrier screening: declined    PNL: B+ / ABSC - / AA / 11.3, 269 / hiv, hepB, rub, syph, gc, chl all neg / VZV immune / UG pat / pap NILM 10/12/17 / glucola 91   -GBS: positive - PCN intrapartum    PMH/Pregnancy problems:  -N/V of pregnancy/hyperemesis requiring admission x1: resolved  -recurrent mild HAs: good hydration, tylenol prn  -h/o anxiety/depression: mood currently stable, no meds, discussed increased risk of PP depression  -rash: resolved    Delivery/PP plans:  -plans to breast feed --> class through [facility]  -desires epidural  -FEMALE \"Andreia\"    Social:  -partner \"Julián\"   -works for [employer]             Migraine without aura, intractable, without status migrainosus ICD-10-CM: H99.913  ICD-9-CM: 346.11  5/19/2017 - Present        UTI (lower urinary tract infection) ICD-10-CM: N39.0  ICD-9-CM: 599.0 10/16/2015 - Present            Attending Physician:  Farhad Lee MD    Delivery Type:  Vaginal Childbirth: What To Expect At Home    Your Recovery: Your body will slowly heal in the next few weeks. It is easy to get too tired and overwhelmed during the first weeks after your baby is born. Changes in your hormones can shift your mood without warning. You may find it hard to meet the extra demands on your energy and time. Take it easy on yourself. Follow-up care is a key part of your treatment and safety. Be sure to make and go to all appointments, and call your doctor if you are having problems. It's also a good idea to know your test results and keep a list of the medicines you take. How can you care for yourself at home? Vaginal bleeding and cramps  · After delivery, you will have a bloody discharge from the vagina. This will turn pink within a week and then white or yellow after about 10 days. It may last for 2 to 4 weeks or longer, until the uterus has healed. Use pads instead of tampons until you stop bleeding. · Do not worry if you pass some blood clots, as long as they are smaller than a golf ball. If you have a tear or stitches in your vaginal area, change the pad at least every 4 hours to prevent soreness and infection. · You may have cramps for the first few days after childbirth. These are normal and occur as the uterus shrinks to normal size. Take an over-the-counter pain medicine, such as acetaminophen (Tylenol), ibuprofen (Advil, Motrin), or naproxen (Aleve), for cramps. Read and follow all instructions on the label. Do not take aspirin, because it can cause more bleeding. Do not take acetaminophen (Tylenol) and other acetaminophen containing medications (i.e. Percocet) at the same time. Breast fullness  · Your breasts may overfill (engorge) in the first few days after delivery.  To help milk flow and to relieve pain, warm your breasts in the shower or by using warm, moist towels before nursing. · If you are not nursing, do not put warmth on your breasts or touch your breasts. Wear a tight bra or sports bra and use ice until the fullness goes away. This usually takes 2 to 3 days. · Put ice or a cold pack on your breast after nursing to reduce swelling and pain. Put a thin cloth between the ice and your skin. Activity  · Eat a balanced diet. Do not try to lose weight by cutting calories. Keep taking your prenatal vitamins, or take a multivitamin. · Get as much rest as you can. Try to take naps when your baby sleeps during the day. · Get some exercise every day. But do not do any heavy exercise until your doctor says it is okay. · Wait until you are healed (about 4 to 6 weeks) before you have sexual intercourse. Your doctor will tell you when it is okay to have sex. · Talk to your doctor about birth control. You can get pregnant even before your period returns. Also, you can get pregnant while you are breast-feeding. Mental Health  · Many women get the \"baby blues\" during the first few days after childbirth. You may lose sleep, feel irritable, and cry easily. You may feel happy one minute and sad the next. Hormone changes are one cause of these emotional changes. Also, the demands of a new baby, along with visits from relatives or other family needs, add to a mother's stress. The \"baby blues\" often peak around the fourth day. Then they ease up in less than 2 weeks. · If your moodiness or anxiety lasts for more than 2 weeks, or if you feel like life is not worth living, you may have postpartum depression. This is different for each mother. Some mothers with serious depression may worry intensely about their infant's well-being. Others may feel distant from their child. Some mothers might even feel that they might harm their baby. A mother may have signs of paranoia, wondering if someone is watching her. · With all the changes in your life, you may not know if you are depressed. Pregnancy sometimes causes changes in how you feel that are similar to the symptoms of depression. · Symptoms of depression include:  · Feeling sad or hopeless and losing interest in daily activities. These are the most common symptoms of depression. · Sleeping too much or not enough. · Feeling tired. You may feel as if you have no energy. · Eating too much or too little. · POSTPARTUM SUPPORT INTERNATIONAL (PSI) offers a Warm line; Chat with the Expert phone sessions; Information and Articles about Pregnancy and Postpartum Mood Disorders; Comprehensive List of Free Support Groups; Knowledgeable local coordinators who will offer support, information, and resources; Guide to Resources on [employer]; Calendar of events in the  mood disorders community; Latest News and Research; and [facility] [address] for [employer]. Remember - You are not alone; You are not to blame; With help, you will be well. 0-597-879-PPD(3673). WWW. POSTPARTUM. NET   · Writing or talking about death, such as writing suicide notes or talking about guns, knives, or pills. Keep the numbers for these national suicide hotlines: 8-054-601-TALK (8-953.509.3355) and 6-521-YKYFZSZ (7-382.941.4367). If you or someone you know talks about suicide or feeling hopeless, get help right away. Constipation and Hemorrhoids  · Drink plenty of fluids, enough so that your urine is light yellow or clear like water. If you have kidney, heart, or liver disease and have to limit fluids, talk with your doctor before you increase the amount of fluids you drink. · Eat plenty of fiber each day. Have a bran muffin or bran cereal for breakfast, and try eating a piece of fruit for a mid-afternoon snack. · For painful, itchy hemorrhoids, put ice or a cold pack on the area several times a day for 10 minutes at a time. Follow this by putting a warm compress on the area for another 10 to 20 minutes or by sitting in a shallow, warm bath.     When should you call for help? Call 911 anytime you think you may need emergency care. For example, call if:  · You are thinking of hurting yourself, your baby, or anyone else. · You passed out (lost consciousness). · You have symptoms of a blood clot in your lung (called a pulmonary embolism). These may include:    · Sudden chest pain. · Trouble breathing. · Coughing up blood. Call your doctor now or seek immediate medical care if:  · You have severe vaginal bleeding. · You are soaking through a pad each hour for 2 or more hours. · Your vaginal bleeding seems to be getting heavier or is still bright red 4 days after delivery. · You are dizzy or lightheaded, or you feel like you may faint. · You are vomiting or cannot keep fluids down. · You have a fever. · You have new or more belly pain. · You pass tissue (not just blood). · Your vaginal discharge smells bad. · Your belly feels tender or full and hard. · Your breasts are continuously painful or red. · You feel sad, anxious, or hopeless for more than a few days. · You have sudden, severe pain in your belly. · You have symptoms of a blood clot in your leg (called a deep vein thrombosis),          such as:  · Pain in your calf, back of the knee, thigh, or groin. · Redness and swelling in your leg or groin. · You have symptoms of preeclampsia, such as:  · Sudden swelling of your face, hands, or feet. · New vision problems (such as dimness or blurring). · A severe headache. · Your blood pressure is higher than it should be or rises suddenly. · You have new nausea or vomiting. Watch closely for changes in your health, and be sure to contact your doctor if you have any problems.        Additional Information:  Not applicable    These are general instructions for a healthy lifestyle:    No smoking/ No tobacco products/ Avoid exposure to second hand smoke    Surgeon General's Warning:  Quitting smoking now greatly reduces serious risk to your health. Obesity, smoking, and sedentary lifestyle greatly increases your risk for illness    A healthy diet, regular physical exercise & weight monitoring are important for maintaining a healthy lifestyle    Recognize signs and symptoms of STROKE:    F-face looks uneven    A-arms unable to move or move unevenly    S-speech slurred or non-existent    T-time-call 911 as soon as signs and symptoms begin - DO NOT go       back to bed or wait to see if you get better - TIME IS BRAIN. I have had the opportunity to make my options or choices for discharge. I have received and understand these instructions. BMP/CBC/PT/PTT/INR/EKG/COVID-19

## 2022-01-18 ENCOUNTER — HOSPITAL ENCOUNTER (EMERGENCY)
Age: 27
Discharge: HOME | End: 2022-01-18
Attending: EMERGENCY MEDICINE
Payer: COMMERCIAL

## 2022-01-18 VITALS
RESPIRATION RATE: 18 BRPM | SYSTOLIC BLOOD PRESSURE: 121 MMHG | HEART RATE: 73 BPM | DIASTOLIC BLOOD PRESSURE: 74 MMHG | OXYGEN SATURATION: 99 % | TEMPERATURE: 98.5 F

## 2022-01-18 DIAGNOSIS — Z20.822 SUSPECTED COVID-19 VIRUS INFECTION: Primary | ICD-10-CM

## 2022-01-18 DIAGNOSIS — R11.2 NON-INTRACTABLE VOMITING WITH NAUSEA, UNSPECIFIED VOMITING TYPE: ICD-10-CM

## 2022-01-18 LAB
ALBUMIN SERPL-MCNC: 4.7 G/DL (ref 3.5–5)
ALBUMIN/GLOB SERPL: 1.4 {RATIO} (ref 1.1–2.2)
ALP SERPL-CCNC: 54 U/L (ref 45–117)
ALT SERPL-CCNC: 24 U/L (ref 12–78)
ANION GAP SERPL CALC-SCNC: 7 MMOL/L (ref 5–15)
AST SERPL-CCNC: 15 U/L (ref 15–37)
BASOPHILS # BLD: 0 K/UL (ref 0–0.1)
BASOPHILS NFR BLD: 0 % (ref 0–1)
BILIRUB SERPL-MCNC: 0.6 MG/DL (ref 0.2–1)
BUN SERPL-MCNC: 12 MG/DL (ref 6–20)
BUN/CREAT SERPL: 15 (ref 12–20)
CALCIUM SERPL-MCNC: 9.9 MG/DL (ref 8.5–10.1)
CHLORIDE SERPL-SCNC: 108 MMOL/L (ref 97–108)
CO2 SERPL-SCNC: 23 MMOL/L (ref 21–32)
COMMENT, HOLDF: NORMAL
CREAT SERPL-MCNC: 0.79 MG/DL (ref 0.55–1.02)
DIFFERENTIAL METHOD BLD: ABNORMAL
EOSINOPHIL # BLD: 0 K/UL (ref 0–0.4)
EOSINOPHIL NFR BLD: 0 % (ref 0–7)
ERYTHROCYTE [DISTWIDTH] IN BLOOD BY AUTOMATED COUNT: 12.5 % (ref 11.5–14.5)
GLOBULIN SER CALC-MCNC: 3.3 G/DL (ref 2–4)
GLUCOSE SERPL-MCNC: 127 MG/DL (ref 65–100)
HCG SERPL QL: NEGATIVE
HCT VFR BLD AUTO: 41 % (ref 35–47)
HGB BLD-MCNC: 13.7 G/DL (ref 11.5–16)
IMM GRANULOCYTES # BLD AUTO: 0 K/UL (ref 0–0.04)
IMM GRANULOCYTES NFR BLD AUTO: 0 % (ref 0–0.5)
LIPASE SERPL-CCNC: 96 U/L (ref 73–393)
LYMPHOCYTES # BLD: 0.8 K/UL (ref 0.8–3.5)
LYMPHOCYTES NFR BLD: 12 % (ref 12–49)
MCH RBC QN AUTO: 29.1 PG (ref 26–34)
MCHC RBC AUTO-ENTMCNC: 33.4 G/DL (ref 30–36.5)
MCV RBC AUTO: 87 FL (ref 80–99)
MONOCYTES # BLD: 0.5 K/UL (ref 0–1)
MONOCYTES NFR BLD: 7 % (ref 5–13)
NEUTS SEG # BLD: 5.7 K/UL (ref 1.8–8)
NEUTS SEG NFR BLD: 81 % (ref 32–75)
NRBC # BLD: 0 K/UL (ref 0–0.01)
NRBC BLD-RTO: 0 PER 100 WBC
PLATELET # BLD AUTO: 230 K/UL (ref 150–400)
PMV BLD AUTO: 10.5 FL (ref 8.9–12.9)
POTASSIUM SERPL-SCNC: 3.6 MMOL/L (ref 3.5–5.1)
PROT SERPL-MCNC: 8 G/DL (ref 6.4–8.2)
RBC # BLD AUTO: 4.71 M/UL (ref 3.8–5.2)
SAMPLES BEING HELD,HOLD: NORMAL
SARS-COV-2, COV2: NORMAL
SARS-COV-2, XPLCVT: DETECTED
SODIUM SERPL-SCNC: 138 MMOL/L (ref 136–145)
SOURCE, COVRS: ABNORMAL
WBC # BLD AUTO: 7.1 K/UL (ref 3.6–11)

## 2022-01-18 PROCEDURE — 74011250636 HC RX REV CODE- 250/636: Performed by: PHYSICIAN ASSISTANT

## 2022-01-18 PROCEDURE — 96374 THER/PROPH/DIAG INJ IV PUSH: CPT

## 2022-01-18 PROCEDURE — 84703 CHORIONIC GONADOTROPIN ASSAY: CPT

## 2022-01-18 PROCEDURE — 74011250636 HC RX REV CODE- 250/636

## 2022-01-18 PROCEDURE — 85025 COMPLETE CBC W/AUTO DIFF WBC: CPT

## 2022-01-18 PROCEDURE — 80053 COMPREHEN METABOLIC PANEL: CPT

## 2022-01-18 PROCEDURE — 36415 COLL VENOUS BLD VENIPUNCTURE: CPT

## 2022-01-18 PROCEDURE — U0005 INFEC AGEN DETEC AMPLI PROBE: HCPCS

## 2022-01-18 PROCEDURE — 99282 EMERGENCY DEPT VISIT SF MDM: CPT

## 2022-01-18 PROCEDURE — 83690 ASSAY OF LIPASE: CPT

## 2022-01-18 RX ORDER — ONDANSETRON 2 MG/ML
INJECTION INTRAMUSCULAR; INTRAVENOUS
Status: COMPLETED
Start: 2022-01-18 | End: 2022-01-18

## 2022-01-18 RX ORDER — ONDANSETRON 4 MG/1
4 TABLET, ORALLY DISINTEGRATING ORAL
Qty: 15 TABLET | Refills: 0 | Status: SHIPPED | OUTPATIENT
Start: 2022-01-18 | End: 2022-01-22 | Stop reason: SDUPTHER

## 2022-01-18 RX ORDER — ONDANSETRON 2 MG/ML
4 INJECTION INTRAMUSCULAR; INTRAVENOUS
Status: COMPLETED | OUTPATIENT
Start: 2022-01-18 | End: 2022-01-18

## 2022-01-18 RX ORDER — ONDANSETRON 4 MG/1
4 TABLET, ORALLY DISINTEGRATING ORAL
Status: DISCONTINUED | OUTPATIENT
Start: 2022-01-18 | End: 2022-01-18

## 2022-01-18 RX ADMIN — ONDANSETRON 4 MG: 2 INJECTION INTRAMUSCULAR; INTRAVENOUS at 09:38

## 2022-01-18 RX ADMIN — SODIUM CHLORIDE 1000 ML: 9 INJECTION, SOLUTION INTRAVENOUS at 09:39

## 2022-01-18 RX ADMIN — ONDANSETRON HYDROCHLORIDE 4 MG: 2 SOLUTION INTRAMUSCULAR; INTRAVENOUS at 09:38

## 2022-01-18 NOTE — ED PROVIDER NOTES
30 y/o female presenting with complaint of vomiting. The patient states that her roommate tested positive for COVID 8 days ago. 5 days ago she began to experience headache, chills and body aches, followed by vomiting 2 days ago. Her vomiting worsened last night, prompting her visit to the ED. She also reports constipation, last BM was a few days ago. She has been taking Mucinex. LMP ended yesterday. She reports subjective fever but denies sore throat, cough, chest pain, or syncope. The history is provided by the patient. Past Medical History:   Diagnosis Date    Anxiety     Chlamydia 10/2016    Depression     Headache     N&V (nausea and vomiting)     Routine Papanicolaou smear 02/09/2016    Nl with +yeast (media tab)     Snoring     SOB (shortness of breath)        Past Surgical History:   Procedure Laterality Date    HX TONSILLECTOMY      HX WISDOM TEETH EXTRACTION           Family History:   Problem Relation Age of Onset    No Known Problems Mother     Hypertension Father     Heart Disease Father     No Known Problems Sister     No Known Problems Brother     Ovarian Cancer Maternal Aunt     Cancer Maternal Uncle         Lung     No Known Problems Paternal Aunt     No Known Problems Paternal Uncle     Cancer Maternal Grandmother         Lung     No Known Problems Maternal Grandfather     Diabetes Paternal Grandmother     Cancer Paternal Grandfather         Liver (?)    Cancer Other     Mental Retardation Other         Downs' syndrome       Social History     Socioeconomic History    Marital status: SINGLE     Spouse name: Not on file    Number of children: Not on file    Years of education: Not on file    Highest education level: Not on file   Occupational History    Not on file   Tobacco Use    Smoking status: Former Smoker    Smokeless tobacco: Never Used    Tobacco comment: Never used vapor or e-cigs    Substance and Sexual Activity    Alcohol use:  No Alcohol/week: 5.0 standard drinks     Types: 5 Cans of beer per week     Comment: Twice a week     Drug use: No    Sexual activity: Yes     Partners: Male     Birth control/protection: Pill   Other Topics Concern    Not on file   Social History Narrative     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     Social Determinants of Health     Financial Resource Strain:     Difficulty of Paying Living Expenses: Not on file   Food Insecurity:     Worried About Running Out of Food in the Last Year: Not on file    [place] of Food in the Last Year: Not on file   Transportation Needs:     Lack of Transportation (Medical): Not on file    Lack of Transportation (Non-Medical): Not on file   Physical Activity:     Days of Exercise per Week: Not on file    Minutes of Exercise per Session: Not on file   Stress:     Feeling of Stress : Not on file   Social Connections:     Frequency of Communication with Friends and Family: Not on file    Frequency of Social Gatherings with Friends and Family: Not on file    Attends Tenriism Services: Not on file    Active Member of [address] [place] [address] or Organizations: Not on file    Attends Club or Organization Meetings: Not on file    Marital Status: Not on file   Intimate Partner Violence:     Fear of Current or Ex-Partner: Not on file    Emotionally Abused: Not on file    Physically Abused: Not on file    Sexually Abused: Not on file   Housing Stability:     Unable to Pay for Housing in the Last Year: Not on file    Number of [place] in the Last Year: Not on file    Unstable Housing in the Last Year: Not on file         ALLERGIES: Motrin [ibuprofen] and Tylenol [acetaminophen]    Review of Systems   Constitutional: Positive for chills and fever (subjective). HENT: Positive for congestion (intermittent). Negative for sore throat. Respiratory: Negative for cough. Cardiovascular: Negative for chest pain. Gastrointestinal: Positive for constipation, nausea and vomiting. Negative for diarrhea. Musculoskeletal: Positive for myalgias. Neurological: Negative for syncope. Vitals:    01/18/22 0823   BP: 121/74   Pulse: 73   Resp: 18   Temp: 98.5 °F (36.9 °C)   SpO2: 99%            Physical Exam  Vitals and nursing note reviewed. Constitutional:       General: She is not in acute distress. Appearance: She is well-developed. She is not diaphoretic. HENT:      Head: Normocephalic and atraumatic. Eyes:      Conjunctiva/sclera: Conjunctivae normal.   Cardiovascular:      Rate and Rhythm: Normal rate and regular rhythm. Heart sounds: Normal heart sounds. Pulmonary:      Effort: Pulmonary effort is normal.      Breath sounds: Normal breath sounds. Abdominal:      General: Bowel sounds are normal. There is no distension. Palpations: Abdomen is soft. Tenderness: There is abdominal tenderness (mild LUQ). There is no guarding. Musculoskeletal:      Cervical back: Normal range of motion and neck supple. Skin:     General: Skin is warm and dry. Neurological:      Mental Status: She is alert and oriented to person, place, and time. MDM       Procedures        30 y/o female presenting with complaint of vomiting. Vitals within normal limits, abdominal exam benign. CBC, CMP and lipase are unremarkable. Covid test pending. Plan is for discharge home with Rx for zofran, continued quarantine per CDC guidelines. Strict ED return precautions discussed and provided in writing at time of discharge. The patient verbalized understanding and agreement with this plan.

## 2022-01-18 NOTE — PROGRESS NOTES
5:16 PM    I called and left a voicemail for the patient or patient's parent concerning lab results and instructed them to call back the provided number for results. I also advised that the patient's results were available to view via [employer] and to call the provided number if there are any questions.      Fredo Taylor PA-C

## 2022-01-18 NOTE — ED TRIAGE NOTES
Patient room mate tested positive on Wednesday. She reports heavy marijuana smoker and she is vomiting and shaking.

## 2022-01-19 ENCOUNTER — HOSPITAL ENCOUNTER (EMERGENCY)
Age: 27
Discharge: HOME | End: 2022-01-19

## 2022-01-20 ENCOUNTER — PATIENT OUTREACH (OUTPATIENT)
Dept: CASE MANAGEMENT | Age: 27
End: 2022-01-20

## 2022-01-20 NOTE — PROGRESS NOTES
01/20/22  9:00 AM Patient on Daily assignment, Demographics list patient as employee for [employer]. Beatriz Guardado Referred to Geanie Sandhoff. RN.    01/20/22 11:04 AM- Received message from Geanie Sandhoff, Rn, stating patient is no longer an employee for [employer]. Patient called and assessment completed. Ambulatory Care Coordination ED COVID Follow up Call    Challenges to be reviewed by the provider   Additional needs identified to be addressed with provider yes  · [facility] 1/19/22 for Nausea(light headed- Patient has appointment for GI follow up 1/20/22         Encounter was not routed to provider for escalation. Method of communication with provider : none    Discussed COVID-19 related testing which was available at this time. Test results were positive. Patient informed of results, if available? yes. Current Symptoms: nausea, vomiting and dizziness/lightheadedness    Reviewed New or Changed Meds: yes    Do you have what you need at home? yes   Durable Medical Equipment ordered at discharge: None   Home Health/Outpatient orders at discharge: none    Pulse oximeter? no Discussed and confirmed discharge instructions and when to notify provider or seek emergency care. · Patient education provided: Reviewed appropriate site of care based on symptoms and resources available to patient including: hotline number [phone],   · [facility] hotline number 907-440-5823,   · contact PCP for questions or concerns regarding COVID-19,   · CDC guidelines provided along with symptoms to watch out for and return to ED if having difficulty breathing. AC supplied contact information  . Follow up appointment recommended: yes. If no appointment scheduled, scheduling offered: yes. No future appointments.     Interventions: Scheduled appointment with PCP-Declined PCP, has appointment to follow up with GI  Reviewed discharge instructions, medical action plan and red flags with patient who verbalized understanding. Provided contact information for future needs. Plan for follow-up call in 7-10 days based on severity of symptoms and risk factors. Plan for next call: follow up appointment-Appointment scheduled.      Chanell Triana RN

## 2022-01-22 ENCOUNTER — HOSPITAL ENCOUNTER (EMERGENCY)
Age: 27
Discharge: HOME | End: 2022-01-22
Attending: EMERGENCY MEDICINE
Payer: COMMERCIAL

## 2022-01-22 VITALS
TEMPERATURE: 97.1 F | HEART RATE: 61 BPM | DIASTOLIC BLOOD PRESSURE: 63 MMHG | RESPIRATION RATE: 13 BRPM | OXYGEN SATURATION: 99 % | SYSTOLIC BLOOD PRESSURE: 130 MMHG

## 2022-01-22 DIAGNOSIS — E87.6 HYPOKALEMIA: ICD-10-CM

## 2022-01-22 DIAGNOSIS — R11.2 NON-INTRACTABLE VOMITING WITH NAUSEA, UNSPECIFIED VOMITING TYPE: Primary | ICD-10-CM

## 2022-01-22 LAB
ALBUMIN SERPL-MCNC: 5 G/DL (ref 3.5–5)
ALBUMIN/GLOB SERPL: 1.4 {RATIO} (ref 1.1–2.2)
ALP SERPL-CCNC: 56 U/L (ref 45–117)
ALT SERPL-CCNC: 31 U/L (ref 12–78)
ANION GAP SERPL CALC-SCNC: 7 MMOL/L (ref 5–15)
AST SERPL-CCNC: 16 U/L (ref 15–37)
BASOPHILS # BLD: 0 K/UL (ref 0–0.1)
BASOPHILS NFR BLD: 0 % (ref 0–1)
BILIRUB SERPL-MCNC: 1 MG/DL (ref 0.2–1)
BUN SERPL-MCNC: 23 MG/DL (ref 6–20)
BUN/CREAT SERPL: 26 (ref 12–20)
CALCIUM SERPL-MCNC: 10.6 MG/DL (ref 8.5–10.1)
CHLORIDE SERPL-SCNC: 95 MMOL/L (ref 97–108)
CO2 SERPL-SCNC: 33 MMOL/L (ref 21–32)
COMMENT, HOLDF: NORMAL
CREAT SERPL-MCNC: 0.9 MG/DL (ref 0.55–1.02)
DIFFERENTIAL METHOD BLD: ABNORMAL
EOSINOPHIL # BLD: 0 K/UL (ref 0–0.4)
EOSINOPHIL NFR BLD: 0 % (ref 0–7)
ERYTHROCYTE [DISTWIDTH] IN BLOOD BY AUTOMATED COUNT: 12 % (ref 11.5–14.5)
GLOBULIN SER CALC-MCNC: 3.7 G/DL (ref 2–4)
GLUCOSE SERPL-MCNC: 100 MG/DL (ref 65–100)
HCT VFR BLD AUTO: 44.9 % (ref 35–47)
HGB BLD-MCNC: 15.1 G/DL (ref 11.5–16)
IMM GRANULOCYTES # BLD AUTO: 0.1 K/UL (ref 0–0.04)
IMM GRANULOCYTES NFR BLD AUTO: 1 % (ref 0–0.5)
LIPASE SERPL-CCNC: 78 U/L (ref 73–393)
LYMPHOCYTES # BLD: 1.3 K/UL (ref 0.8–3.5)
LYMPHOCYTES NFR BLD: 7 % (ref 12–49)
MAGNESIUM SERPL-MCNC: 2.5 MG/DL (ref 1.6–2.4)
MCH RBC QN AUTO: 29.4 PG (ref 26–34)
MCHC RBC AUTO-ENTMCNC: 33.6 G/DL (ref 30–36.5)
MCV RBC AUTO: 87.4 FL (ref 80–99)
MONOCYTES # BLD: 0.8 K/UL (ref 0–1)
MONOCYTES NFR BLD: 5 % (ref 5–13)
NEUTS SEG # BLD: 14.8 K/UL (ref 1.8–8)
NEUTS SEG NFR BLD: 87 % (ref 32–75)
NRBC # BLD: 0 K/UL (ref 0–0.01)
NRBC BLD-RTO: 0 PER 100 WBC
PLATELET # BLD AUTO: 339 K/UL (ref 150–400)
PMV BLD AUTO: 10.5 FL (ref 8.9–12.9)
POTASSIUM SERPL-SCNC: 3.3 MMOL/L (ref 3.5–5.1)
PROT SERPL-MCNC: 8.7 G/DL (ref 6.4–8.2)
RBC # BLD AUTO: 5.14 M/UL (ref 3.8–5.2)
SAMPLES BEING HELD,HOLD: NORMAL
SODIUM SERPL-SCNC: 135 MMOL/L (ref 136–145)
WBC # BLD AUTO: 17 K/UL (ref 3.6–11)

## 2022-01-22 PROCEDURE — 83690 ASSAY OF LIPASE: CPT

## 2022-01-22 PROCEDURE — 85025 COMPLETE CBC W/AUTO DIFF WBC: CPT

## 2022-01-22 PROCEDURE — 96375 TX/PRO/DX INJ NEW DRUG ADDON: CPT

## 2022-01-22 PROCEDURE — 83735 ASSAY OF MAGNESIUM: CPT

## 2022-01-22 PROCEDURE — 96365 THER/PROPH/DIAG IV INF INIT: CPT

## 2022-01-22 PROCEDURE — 93005 ELECTROCARDIOGRAM TRACING: CPT

## 2022-01-22 PROCEDURE — 36415 COLL VENOUS BLD VENIPUNCTURE: CPT

## 2022-01-22 PROCEDURE — 99284 EMERGENCY DEPT VISIT MOD MDM: CPT

## 2022-01-22 PROCEDURE — 80053 COMPREHEN METABOLIC PANEL: CPT

## 2022-01-22 PROCEDURE — 96366 THER/PROPH/DIAG IV INF ADDON: CPT

## 2022-01-22 PROCEDURE — 74011250636 HC RX REV CODE- 250/636: Performed by: EMERGENCY MEDICINE

## 2022-01-22 RX ORDER — POTASSIUM CHLORIDE AND SODIUM CHLORIDE 900; 300 MG/100ML; MG/100ML
INJECTION, SOLUTION INTRAVENOUS ONCE
Status: COMPLETED | OUTPATIENT
Start: 2022-01-22 | End: 2022-01-22

## 2022-01-22 RX ORDER — ONDANSETRON 4 MG/1
4 TABLET, ORALLY DISINTEGRATING ORAL
Qty: 20 TABLET | Refills: 0 | Status: SHIPPED | OUTPATIENT
Start: 2022-01-22

## 2022-01-22 RX ORDER — ONDANSETRON 2 MG/ML
4 INJECTION INTRAMUSCULAR; INTRAVENOUS
Status: COMPLETED | OUTPATIENT
Start: 2022-01-22 | End: 2022-01-22

## 2022-01-22 RX ADMIN — ONDANSETRON HYDROCHLORIDE 4 MG: 2 SOLUTION INTRAMUSCULAR; INTRAVENOUS at 17:56

## 2022-01-22 RX ADMIN — POTASSIUM CHLORIDE AND SODIUM CHLORIDE: 900; 300 INJECTION, SOLUTION INTRAVENOUS at 17:04

## 2022-01-22 NOTE — ED PROVIDER NOTES
Ermias Mora is a 30 yo F with h/o chronic marijuana use who presents to the ED with nausea. She has had nausea and vomiting for the past week and has been researching her symptoms on google and suspects that it is due to her marijuana use and so has stopped using it but continues to have nausea and vomiting. She is concerned that she has become dehydrated. She denies abdominal pain, diarrhea or fever. She has been taking hot showers which helps somewhat.             Past Medical History:   Diagnosis Date    Anxiety     Chlamydia 10/2016    Depression     Headache     N&V (nausea and vomiting)     Routine Papanicolaou smear 02/09/2016    Nl with +yeast (media tab)     Snoring     SOB (shortness of breath)        Past Surgical History:   Procedure Laterality Date    HX TONSILLECTOMY      HX WISDOM TEETH EXTRACTION           Family History:   Problem Relation Age of Onset    No Known Problems Mother     Hypertension Father     Heart Disease Father     No Known Problems Sister     No Known Problems Brother     Ovarian Cancer Maternal Aunt     Cancer Maternal Uncle         Lung     No Known Problems Paternal Aunt     No Known Problems Paternal Uncle     Cancer Maternal Grandmother         Lung     No Known Problems Maternal Grandfather     Diabetes Paternal Grandmother     Cancer Paternal Grandfather         Liver (?)    Cancer Other     Mental Retardation Other         Downs' syndrome       Social History     Socioeconomic History    Marital status: SINGLE     Spouse name: Not on file    Number of children: Not on file    Years of education: Not on file    Highest education level: Not on file   Occupational History    Not on file   Tobacco Use    Smoking status: Former Smoker    Smokeless tobacco: Never Used    Tobacco comment: Never used vapor or e-cigs    Substance and Sexual Activity    Alcohol use: No     Alcohol/week: 5.0 standard drinks     Types: 5 Cans of beer per week Comment: Twice a week     Drug use: No    Sexual activity: Yes     Partners: Male     Birth control/protection: Pill   Other Topics Concern    Not on file   Social History Narrative     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     Social Determinants of Health     Financial Resource Strain:     Difficulty of Paying Living Expenses: Not on file   Food Insecurity:     Worried About Running Out of Food in the Last Year: Not on file    [place]arnaz of Food in the Last Year: Not on file   Transportation Needs:     Lack of Transportation (Medical): Not on file    Lack of Transportation (Non-Medical): Not on file   Physical Activity:     Days of Exercise per Week: Not on file    Minutes of Exercise per Session: Not on file   Stress:     Feeling of Stress : Not on file   Social Connections:     Frequency of Communication with Friends and Family: Not on file    Frequency of Social Gatherings with Friends and Family: Not on file    Attends Gnosticism Services: Not on file    Active Member of [address] [place] [address] [employer] or Organizations: Not on file    Attends Club or Organization Meetings: Not on file    Marital Status: Not on file   Intimate Partner Violence:     Fear of Current or Ex-Partner: Not on file    Emotionally Abused: Not on file    Physically Abused: Not on file    Sexually Abused: Not on file   Housing Stability:     Unable to Pay for Housing in the Last Year: Not on file    Number of [place] in the Last Year: Not on file    Unstable Housing in the Last Year: Not on file         ALLERGIES: Motrin [ibuprofen] and Tylenol [acetaminophen]    Review of Systems   Constitutional: Negative for fever. HENT: Negative for sore throat. Eyes: Negative for visual disturbance. Respiratory: Negative for cough. Cardiovascular: Negative for chest pain. Gastrointestinal: Positive for nausea and vomiting. Negative for abdominal pain and diarrhea. Genitourinary: Negative for dysuria.    Musculoskeletal: Negative for back pain. Skin: Negative for rash. Neurological: Negative for headaches. Vitals:    01/22/22 1408   BP: 132/84   Pulse: 76   Resp: 15   Temp: 97.1 °F (36.2 °C)   SpO2: 99%            Physical Exam  Vitals and nursing note reviewed. Constitutional:       General: She is not in acute distress. Appearance: She is well-developed. HENT:      Head: Normocephalic and atraumatic. Eyes:      Conjunctiva/sclera: Conjunctivae normal.   Neck:      Trachea: Phonation normal.   Cardiovascular:      Rate and Rhythm: Normal rate. Pulmonary:      Effort: Pulmonary effort is normal. No respiratory distress. Abdominal:      General: There is no distension. Tenderness: There is no abdominal tenderness. There is no guarding. Musculoskeletal:         General: No tenderness. Normal range of motion. Cervical back: Normal range of motion. Skin:     General: Skin is warm and dry. Neurological:      Mental Status: She is alert. She is not disoriented. Motor: No abnormal muscle tone. ED EKG interpretation:  Rhythm: normal sinus rhythm; and regular . Rate (approx.): 62; Axis: normal; P wave: normal; QRS interval: normal ; ST/T wave: normal; Other findings: normal. This EKG was interpreted by Navya Marcelo MD,ED Provider. MDM       5:41 PM  Patient tolerating PO with zofran. Labs reviewed and K 3.3 and magnesium 2.5.  1 L IVNS+40meq KCL IV ordered. Anticipate discharge home with zofran when IV completed.    Procedures

## 2022-01-22 NOTE — ED TRIAGE NOTES
Pt ambulatory to ED with c/o N/V onset 6 days ago. \"I'm going through a marijuana detox and have been sick since then\".

## 2022-01-23 LAB
ATRIAL RATE: 62 BPM
CALCULATED P AXIS, ECG09: 67 DEGREES
CALCULATED R AXIS, ECG10: 56 DEGREES
CALCULATED T AXIS, ECG11: 52 DEGREES
DIAGNOSIS, 93000: NORMAL
P-R INTERVAL, ECG05: 116 MS
Q-T INTERVAL, ECG07: 410 MS
QRS DURATION, ECG06: 100 MS
QTC CALCULATION (BEZET), ECG08: 416 MS
VENTRICULAR RATE, ECG03: 62 BPM

## 2022-03-08 ENCOUNTER — OFFICE VISIT (OUTPATIENT)
Dept: NEUROLOGY | Age: 27
End: 2022-03-08
Payer: COMMERCIAL

## 2022-03-08 VITALS
HEIGHT: 64 IN | SYSTOLIC BLOOD PRESSURE: 138 MMHG | WEIGHT: 172 LBS | BODY MASS INDEX: 29.37 KG/M2 | DIASTOLIC BLOOD PRESSURE: 88 MMHG | OXYGEN SATURATION: 98 % | HEART RATE: 72 BPM

## 2022-03-08 DIAGNOSIS — G43.009 MIGRAINE WITHOUT AURA AND WITHOUT STATUS MIGRAINOSUS, NOT INTRACTABLE: ICD-10-CM

## 2022-03-08 DIAGNOSIS — R51.9 WORSENING HEADACHES: Primary | ICD-10-CM

## 2022-03-08 DIAGNOSIS — U07.1 COVID-19: ICD-10-CM

## 2022-03-08 DIAGNOSIS — H53.9 VISION CHANGES: ICD-10-CM

## 2022-03-08 PROCEDURE — 99204 OFFICE O/P NEW MOD 45 MIN: CPT | Performed by: PSYCHIATRY & NEUROLOGY

## 2022-03-08 RX ORDER — RIZATRIPTAN BENZOATE 10 MG/1
10 TABLET, ORALLY DISINTEGRATING ORAL
Qty: 9 TABLET | Refills: 3 | Status: SHIPPED | OUTPATIENT
Start: 2022-03-08 | End: 2022-03-08

## 2022-03-08 NOTE — PROGRESS NOTES
Chief Complaint   Patient presents with    Neurologic Problem     migraines \" get floaters in my vision, I have had three most recently. \"     Visit Vitals  /88 (BP 1 Location: Left upper arm, BP Patient Position: Sitting)   Pulse 72   Ht 5' 4\" (1.626 m)   Wt 172 lb (78 kg)   SpO2 98%   BMI 29.52 kg/m²

## 2022-03-19 PROBLEM — K59.09 OTHER CONSTIPATION: Status: ACTIVE | Noted: 2017-11-09

## 2022-03-19 PROBLEM — G43.019 MIGRAINE WITHOUT AURA, INTRACTABLE, WITHOUT STATUS MIGRAINOSUS: Status: ACTIVE | Noted: 2017-05-19

## 2022-04-18 NOTE — TELEPHONE ENCOUNTER
Spoke with patient and c/o bilateral breast pain but left side is the worst, fever, chills, body aches, and decreased breast milk output. Patient instructed to breast feed and/or pump frequently, warm compress to breast, deep tissue massage, and tylenol as needed. Allergy to ibuprofen in the hospital. Precautions given to call if no improvement in 24 hours, breast lump, severe pain, or redness/streaking. Advised Rx for dicloxicillin sent to pharmacy. Patient verbalized understanding. high school

## 2022-05-06 NOTE — PROGRESS NOTES
Chief Complaint   Patient presents with    Neurologic Problem     migraines \" get floaters in my vision, I have had three most recently. \"       Referred by: NP Sangita Wu is a 74-year-old woman here to discuss headaches. She tells me she has had headaches off and on since she was a teenager but as of January this year the headaches have changed. She suffered a COVID infection requiring IV fluids but no respiratory support and since then she has had monthly severe headaches described as severe throbbing pressure-like sensation with vision changes described as a large white spot sometimes in the upper left periphery exclusively. She has nausea vomiting light sensitivity and sound sensitivity. Intermixed she has daily mild headaches bifrontal.  She works in a doctor's office. No seizure activity. Planning on pregnancy soon. Review of Systems   Gastrointestinal: Positive for nausea and vomiting. Neurological: Positive for headaches. All other systems reviewed and are negative.       Past Medical History:   Diagnosis Date    Anxiety     Chlamydia 10/2016    Depression     Headache     N&V (nausea and vomiting)     Routine Papanicolaou smear 02/09/2016    Nl with +yeast (media tab)     Snoring     SOB (shortness of breath)      Family History   Problem Relation Age of Onset    No Known Problems Mother     Hypertension Father     Heart Disease Father     No Known Problems Sister     No Known Problems Brother     Ovarian Cancer Maternal Aunt     Cancer Maternal Uncle         Lung     No Known Problems Paternal Aunt     No Known Problems Paternal Uncle     Cancer Maternal Grandmother         Lung     No Known Problems Maternal Grandfather     Diabetes Paternal Grandmother     Cancer Paternal Grandfather         Liver (?)    Cancer Other     Mental Retardation Other         Downs' syndrome     Social History     Socioeconomic History    Marital status: SINGLE     Spouse Per Dr. Sri Lemons, place order for SAT/SBT tomorrow AM. See orders. name: Not on file    Number of children: Not on file    Years of education: Not on file    Highest education level: Not on file   Occupational History    Not on file   Tobacco Use    Smoking status: Former Smoker    Smokeless tobacco: Never Used    Tobacco comment: Never used vapor or e-cigs    Vaping Use    Vaping Use: Never used   Substance and Sexual Activity    Alcohol use: No     Alcohol/week: 5.0 standard drinks     Types: 5 Cans of beer per week     Comment: Twice a week     Drug use: No    Sexual activity: Yes     Partners: Male     Birth control/protection: Pill   Other Topics Concern    Not on file   Social History Narrative     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     Social Determinants of Health     Financial Resource Strain:     Difficulty of Paying Living Expenses: Not on file   Food Insecurity:     Worried About Running Out of Food in the Last Year: Not on file    Farnaz of Food in the Last Year: Not on file   Transportation Needs:     Lack of Transportation (Medical): Not on file    Lack of Transportation (Non-Medical):  Not on file   Physical Activity:     Days of Exercise per Week: Not on file    Minutes of Exercise per Session: Not on file   Stress:     Feeling of Stress : Not on file   Social Connections:     Frequency of Communication with Friends and Family: Not on file    Frequency of Social Gatherings with Friends and Family: Not on file    Attends Adventism Services: Not on file    Active Member of Clubs or Organizations: Not on file    Attends Club or Organization Meetings: Not on file    Marital Status: Not on file   Intimate Partner Violence:     Fear of Current or Ex-Partner: Not on file    Emotionally Abused: Not on file    Physically Abused: Not on file    Sexually Abused: Not on file   Housing Stability:     Unable to Pay for Housing in the Last Year: Not on file    Number of [place] in the Last Year: Not on file    Unstable Housing in the Last Year: Not on file     Current Outpatient Medications   Medication Sig    rizatriptan (MAXALT-MLT) 10 mg disintegrating tablet Take 1 Tablet by mouth once as needed for Migraine for up to 1 dose.  docusate sodium (COLACE) 50 mg capsule Take 50 mg by mouth two (2) times a day.  FIBER CHOICE PO Take  by mouth.  FERROUS SULFATE, DRIED (IRON, DRIED, PO) Take  by mouth.  [id number] aj-folic-dha (PRENATAL DHA+COMPLETE PRENATAL) -300 mg-mcg-mg cmpk Take  by mouth.  ondansetron (ZOFRAN ODT) 4 mg disintegrating tablet Take 1 Tablet by mouth every eight (8) hours as needed for Nausea or Vomiting. (Patient not taking: Reported on 3/8/2022)    norgestimate-ethinyl estradioL (ORTHO-CYCLEN, SPRINTEC) 0.25-35 mg-mcg tab Take 1 Tab by mouth daily. (Patient not taking: Reported on 3/8/2022)     No current facility-administered medications for this visit. Allergies   Allergen Reactions    Motrin [Ibuprofen] Hives and Itching    Tylenol [Acetaminophen] Rash         Neurologic Exam     Mental Status   Oriented to person, place, and time. Cranial Nerves   Cranial nerves II through XII intact. Motor Exam   Muscle bulk: normal    Strength   Strength 5/5 throughout. Sensory Exam   Light touch normal.     Gait, Coordination, and Reflexes     Gait  Gait: normal    Physical Exam  Vitals and nursing note reviewed. Constitutional:       Appearance: Normal appearance. She is normal weight. Neurological:      Mental Status: She is alert and oriented to person, place, and time. Gait: Gait is intact.       Deep Tendon Reflexes: Strength normal.       Visit Vitals  /88 (BP 1 Location: Left upper arm, BP Patient Position: Sitting)   Pulse 72   Ht 5' 4\" (1.626 m)   Wt 172 lb (78 kg)   SpO2 98%   BMI 29.52 kg/m²       Lab Results   Component Value Date/Time    WBC 17.0 (H) 01/22/2022 02:42 PM    HGB 15.1 01/22/2022 02:42 PM    HCT 44.9 01/22/2022 02:42 PM    PLATELET 026 20/04/7404 02:42 PM    MCV 87.4 01/22/2022 02:42 PM    Hgb, External 11.3 02/13/2018 12:00 AM    Hct, External 33.6 02/13/2018 12:00 AM    Platelet cnt., External 269 02/13/2018 12:00 AM     Lab Results   Component Value Date/Time    ALT (SGPT) 31 01/22/2022 02:42 PM    Alk. phosphatase 56 01/22/2022 02:42 PM    Bilirubin, total 1.0 01/22/2022 02:42 PM    Albumin 5.0 01/22/2022 02:42 PM    Protein, total 8.7 (H) 01/22/2022 02:42 PM    PLATELET 270 32/99/6107 02:42 PM    Platelet cnt., External 269 02/13/2018 12:00 AM        CT Results (maximum last 3): Results from [place] encounter on 07/06/16    CT ABD PELV WO CONT    Narrative  **Final Report**      ICD Codes / Adm. Diagnosis: 794851  R10.84 / Urinary Pain  Possible UTI  Examination:  CT ABD PELVIS WO CON  - [id number] - Jul 6 2016  8:07AM  Accession No:  [id number]  Reason:  RLQ pain.  hematuria. REPORT:  EXAM: CT ABDOMEN AND PELVIS WITHOUT CONTRAST    INDICATION:  Right lower quadrant pain and hematuria. COMPARISON: 4/15/2016. CONTRAST: None. TECHNIQUE:  Unenhanced multislice helical CT was performed from the diaphragm to the  symphysis pubis without oral or intravenous contrast administration. Contiguous 5 mm axial images were reconstructed and lung and soft tissue  windows were generated. Coronal and sagittal reformations were generated. CT  dose reduction was achieved through use of a standardized protocol tailored  for this examination and automatic exposure control for dose modulation. FINDINGS:    LOWER CHEST: The visualized portions of the lung bases are clear. The absence of intravenous contrast material reduces the sensitivity for  evaluation of the solid parenchymal organs of the abdomen. ABDOMEN:  Liver: The liver is normal in size and contour with no focal abnormality. Gallbladder and bile ducts: There are no calcified stones and there is no  biliary duct dilatation. Spleen: No abnormality.     Pancreas: No abnormality. Adrenal glands: No abnormality. Kidneys: No focal parenchymal abnormality. There is no renal or ureteral  calculus or obstruction. PELVIS:  Reproductive organs: The uterus and ovaries are normal.    Bladder: No abnormality. RETROPERITONEUM: The aorta tapers without aneurysm. There is no  retroperitoneal adenopathy or mass. There is no pelvic mass or adenopathy. BOWEL AND MESENTERY: The small bowel is normal. The appendix is normal.    PERITONEUM: There is trace free fluid in the pelvic cul-de-sac which is  likely physiologic. There is no free intraperitoneal air. BONES AND SOFT TISSUES: The bones and soft tissues of the abdominal wall are  within normal limits. Impression  : No renal or ureteral calculus or other acute abdominal or pelvic  abnormality. Signing/Reading Doctor: Randy Rosa ([id number])  Approved: Randy Rosa ([id number])  Jul 6 2016  8:18AM      Results from Hospital Encounter encounter on 04/15/16    CT ABD PELV W CONT    Narrative  **Final Report**      ICD Codes / Adm. Diagnosis: 301198   / Abdominal Pain  Abd pain  Examination:  CT ABD PELVIS W CON  - [id number] - Apr 15 2016 10:34PM  Accession No:  [id number]  Reason:  abd pain . no oral contrast.      REPORT:  INDICATION: Nausea and vomiting yesterday with left upper quadrant pain    Exam: CT of the abdomen and pelvis. 95 cc Isovue 370 and was given  intravenously. No oral contrast. Images were obtained through the abdomen  and pelvis. Sagittal and coronal reformats were performed    Comparisons: None. Findings: There are no focal opacities at the lung bases. Heart size is  normal. The liver is normal in size without focal lesion. The spleen is  normal in appearance. No gallstones. No ductal dilatation. The pancreas is  not enlarged or inflamed. Adrenal glands are normal in size. No enhancing  renal mass. No stones. No hydronephrosis. There is no evidence of bowel obstruction.  There is high density material  layering dependently within the stomach. This is likely ingested material.  No history of hematemesis is given to suggest active extravasation. . The  appendix is normal.    There is no evidence of retroperitoneal adenopathy. There is no free fluid  or free air. The aorta tapers normally. Examination of the pelvis demonstrates no mass lesion. Bladder is  unremarkable. Uterus is normal in appearance    Examination of the bone windows demonstrates no bony destructive change. Impression  :      1. High density material layering dependently within the stomach. This is  likely ingested as no history of hematemesis is given to suggest active  extravasation. Otherwise no acute abnormality    Findings were discussed with Dr. Steven Pritchett by myself at the time of  interpretation          Signing/Reading Doctor: Jairo Stone ([id number])  Approved: [facility] Room CARLYN ([id number])  Apr 15 2016 10:52PM      MRI Results (maximum last 3): No results found for this or any previous visit. PET Results (maximum last 3): No results found for this or any previous visit. Assessment and Plan   Diagnoses and all orders for this visit:    1. Worsening headaches  -     MRI BRAIN WO CONT; Future    2. COVID-19  -     MRI BRAIN WO CONT; Future    3. Vision changes  -     MRI BRAIN WO CONT; Future    4. Migraine without aura and without status migrainosus, not intractable    Other orders  -     rizatriptan (MAXALT-MLT) 10 mg disintegrating tablet; Take 1 Tablet by mouth once as needed for Migraine for up to 1 dose. 69-year-old woman with a history of migraines intermittently who has had a distinct change in headaches since her Covid infection at the beginning of this year. She is having visual changes and more severe symptoms. I would like to check an MRI of the brain because of these changes post Covid. Start magnesium daily for supplementation as headache prevention.   She is planning possible conception so I would not use anything stronger right now. She should start taking prenatal vitamins and folic acid. A trial of rizatriptan acutely. Keep a headache log. I would like to see her in about 6 months. 45 minutes of time was spent in total reviewing the medical record, face-to-face time with her, and completion of medical documentation today. I reviewed and decided to continue the current medications. This clinical note was dictated with an electronic dictation software that can make unintentional errors. If there are any questions, please contact me directly for clarification. A notice of this visit/encounter being completed has been sent electronically to the patient's PCP and/or referring provider.      Silvio Lainez, [address]  Diplomate ABPN

## 2023-05-16 RX ORDER — ONDANSETRON 4 MG/1
4 TABLET, ORALLY DISINTEGRATING ORAL EVERY 8 HOURS PRN
COMMUNITY
Start: 2022-01-22

## 2023-05-16 RX ORDER — NORGESTIMATE AND ETHINYL ESTRADIOL 0.25-0.035
1 KIT ORAL DAILY
COMMUNITY
Start: 2020-01-29
